# Patient Record
Sex: FEMALE | Race: WHITE | URBAN - METROPOLITAN AREA
[De-identification: names, ages, dates, MRNs, and addresses within clinical notes are randomized per-mention and may not be internally consistent; named-entity substitution may affect disease eponyms.]

---

## 2017-06-05 ENCOUNTER — TRANSFERRED RECORDS (OUTPATIENT)
Dept: HEALTH INFORMATION MANAGEMENT | Facility: CLINIC | Age: 47
End: 2017-06-05

## 2017-06-12 ENCOUNTER — TRANSFERRED RECORDS (OUTPATIENT)
Dept: HEALTH INFORMATION MANAGEMENT | Facility: CLINIC | Age: 47
End: 2017-06-12

## 2017-06-15 ENCOUNTER — TRANSFERRED RECORDS (OUTPATIENT)
Dept: HEALTH INFORMATION MANAGEMENT | Facility: CLINIC | Age: 47
End: 2017-06-15

## 2017-06-23 PROCEDURE — 00000346 ZZHCL STATISTIC REVIEW OUTSIDE SLIDES TC 88321: Performed by: OBSTETRICS & GYNECOLOGY

## 2017-06-28 ENCOUNTER — ONCOLOGY VISIT (OUTPATIENT)
Dept: ONCOLOGY | Facility: CLINIC | Age: 47
End: 2017-06-28
Attending: OBSTETRICS & GYNECOLOGY
Payer: MEDICAID

## 2017-06-28 VITALS
DIASTOLIC BLOOD PRESSURE: 83 MMHG | OXYGEN SATURATION: 94 % | SYSTOLIC BLOOD PRESSURE: 130 MMHG | HEART RATE: 80 BPM | WEIGHT: 285.2 LBS | TEMPERATURE: 98.4 F | HEIGHT: 65 IN | RESPIRATION RATE: 12 BRPM | BODY MASS INDEX: 47.52 KG/M2

## 2017-06-28 DIAGNOSIS — C54.1 ENDOMETRIAL ADENOCARCINOMA (H): Primary | ICD-10-CM

## 2017-06-28 PROCEDURE — 99212 OFFICE O/P EST SF 10 MIN: CPT | Mod: ZF

## 2017-06-28 PROCEDURE — 99205 OFFICE O/P NEW HI 60 MIN: CPT | Mod: ZP | Performed by: OBSTETRICS & GYNECOLOGY

## 2017-06-28 RX ORDER — ALBUTEROL SULFATE 90 UG/1
2 AEROSOL, METERED RESPIRATORY (INHALATION) EVERY 4 HOURS PRN
COMMUNITY
Start: 2014-11-10

## 2017-06-28 RX ORDER — DOXEPIN HYDROCHLORIDE 10 MG/1
20 CAPSULE ORAL AT BEDTIME
COMMUNITY
Start: 2017-05-17 | End: 2018-11-09

## 2017-06-28 RX ORDER — ATORVASTATIN CALCIUM 10 MG/1
TABLET, FILM COATED ORAL
COMMUNITY
Start: 2017-01-23 | End: 2019-07-24

## 2017-06-28 RX ORDER — SYRING-NEEDL,DISP,INSUL,0.3 ML 30 GX5/16"
SYRINGE, EMPTY DISPOSABLE MISCELLANEOUS
COMMUNITY
End: 2018-11-09

## 2017-06-28 RX ORDER — CLOTRIMAZOLE 1 %
CREAM (GRAM) TOPICAL
COMMUNITY
End: 2017-08-21

## 2017-06-28 RX ORDER — ESCITALOPRAM OXALATE 20 MG/1
20 TABLET ORAL AT BEDTIME
COMMUNITY
Start: 2017-06-06 | End: 2018-11-09

## 2017-06-28 RX ORDER — MECOBALAMIN 5000 MCG
2.5-5 TABLET,DISINTEGRATING ORAL
COMMUNITY
Start: 2016-06-21

## 2017-06-28 RX ORDER — DOCUSATE SODIUM 100 MG/1
100 CAPSULE, LIQUID FILLED ORAL 2 TIMES DAILY PRN
COMMUNITY

## 2017-06-28 RX ORDER — LANCETS
1 EACH MISCELLANEOUS
Status: ON HOLD | COMMUNITY
Start: 2016-07-06 | End: 2017-07-21

## 2017-06-28 RX ORDER — CLOBETASOL PROPIONATE 0.5 MG/G
CREAM TOPICAL
COMMUNITY
Start: 2017-05-22 | End: 2018-05-27

## 2017-06-28 RX ORDER — FUROSEMIDE 40 MG
40 TABLET ORAL 2 TIMES DAILY
COMMUNITY
Start: 2016-07-18

## 2017-06-28 RX ORDER — MUPIROCIN 20 MG/G
OINTMENT TOPICAL
COMMUNITY
Start: 2016-08-15 | End: 2018-11-09

## 2017-06-28 RX ORDER — BLOOD-GLUCOSE METER
1 EACH MISCELLANEOUS
COMMUNITY
Start: 2014-03-11 | End: 2018-11-09

## 2017-06-28 ASSESSMENT — PAIN SCALES - GENERAL: PAINLEVEL: NO PAIN (0)

## 2017-06-28 NOTE — PATIENT INSTRUCTIONS
[facility] - DANIELLE    [address] [place] [address]  [place] [address]    12:30 CHECK IN  - -  OPEN SIDED MRI    NOTHING TO EAT/DRINK 2 HOURS BEFORE        [facility]     [address]  [address]  [place] [address]    CHECK IN 1:10    BLOOD SUGARS LESS THAN 200  LOW CARBS    NO EXERCISE FOR 24 HOURS    NOTHING TO EAT OR DRINK, INCLUDING, CAFFEINE, TOBACCO, NO SUGAR    NO CHEWING GUM/ NO SUCKING ON MINTS,    DRINK ALL THE WATER YOU WANT

## 2017-06-28 NOTE — LETTER
2017       RE: Mary Anglin  [address]    [facility] [address]     Dear Colleague,    Thank you for referring your patient, Mary Anglin, to the [facility]. Please see a copy of my visit note below.                            Consult Notes on Referred Patient    Date: 2017       Dr. Chris Roberson  [facility]  [address] [address]       RE: Mary Anglin  : 1970  VALENTE: 2017    Dear Dr. Chris Roberson:    I had the pleasure of seeing your patient Mary Anglin here at the Gynecologic Cancer Clinic at the [facility] on 2017.  As you know she is a very pleasant 47 year old woman with a recent diagnosis of recurrent endometrial cancer.  Given these findings she was subsequently sent to the Gynecologic Cancer Clinic for new patient consultation.   G0 had stage IB grade 1 endometrial cancer in , status post JEMAL, BSO and staging. She had recent vaginal bleeding, exam and biopsy revealed a vaginal lesion, positive for recurrent endometrial cancer.  No B-symptoms or change in urinary or bowel habits.    Past Medical History:    DM    Past Surgical History:    JEMAL, BSO    Health Maintenance:  Health Maintenance Due   Topic Date Due     TETANUS IMMUNIZATION (SYSTEM ASSIGNED)  1987     PAP SCREENING Q3 YR (SYSTEM ASSIGNED)  1990     LIPID SCREEN Q5 YR FEMALE (SYSTEM ASSIGNED)  2014           Current Medications:     has a current medication list which includes the following prescription(s): albuterol, atorvastatin, blood glucose monitoring, blood glucose monitoring, clobetasol, clotrimazole, docusate sodium, doxepin, escitalopram, furosemide, lancet device, blood glucose monitoring, melatonin, metformin, UNABLE TO FIND, and mupirocin.       Allergies:     [unfilled]        Social History:     Social History   Substance Use Topics     Smoking status: Not on file     Smokeless tobacco: Not on file  "    Alcohol use Not on file       History   Drug Use Not on file           Physical Exam:     /83  Pulse 80  Temp 98.4  F (36.9  C) (Oral)  Resp 12  Ht 1.655 m (5' 5.16\")  Wt 129.4 kg (285 lb 3.2 oz)  SpO2 94%  BMI 47.23 kg/m2  Body mass index is 47.23 kg/(m^2).    General Appearance: healthy and alert, no distress     Gastrointestinal:       abdomen soft, non-tender, non-distended, no organomegaly or masses    Genitourinary: External genitalia and urethral meatus appears normal.  Vagina 2 cm friable lesion on the anterior mid vagina. Bimanual exam reveal no masses, nodularity or fullness.  Recto-vaginal exam confirms these findings.      Assessment:    Mary Anglin is a 47 year old woman with a new diagnosis of recurrent endometrial cancer.     A total of 60 minutes was spent with the patient, 40 minutes of which were spent in counseling the patient and/or treatment planning.      1. Recurrent endometrial cancer    We will obtain a PET scan as well as MRI to evaluate for local and distant disease extend and await pathology review. We will see her back after those results for treatment planning.      Thank you for allowing us to participate in the care of your patient.         Sincerely,    Abiodun Gore MD, MS    Department of Obstetrics and Gynecology   Division of Gynecologic Oncology   [facility]  Phone: [phone]      CC  Patient Care Team:  Mikel Wells MD as PCP - General (Family Practice)  Chris Roberson as Referring Physician (OB/Gyn)  "

## 2017-06-28 NOTE — PROGRESS NOTES
"                        Consult Notes on Referred Patient    Date: 2017       Dr. Chris Roberson  [facility]  [address] [place] [address]       RE: Mary Anglin  : 1970  VALENTE: 2017    Dear Dr. Chris Roberson:    I had the pleasure of seeing your patient Mary Anglin here at the Gynecologic Cancer Clinic at the [facility] on 2017.  As you know she is a very pleasant 47 year old woman with a recent diagnosis of recurrent endometrial cancer.  Given these findings she was subsequently sent to the Gynecologic Cancer Clinic for new patient consultation.   G0 had stage IB grade 1 endometrial cancer in , status post JEMAL, BSO and staging. She had recent vaginal bleeding, exam and biopsy revealed a vaginal lesion, positive for recurrent endometrial cancer.  No B-symptoms or change in urinary or bowel habits.    Past Medical History:    DM    Past Surgical History:    JEMAL, BSO    Health Maintenance:  Health Maintenance Due   Topic Date Due     TETANUS IMMUNIZATION (SYSTEM ASSIGNED)  1987     PAP SCREENING Q3 YR (SYSTEM ASSIGNED)  1990     LIPID SCREEN Q5 YR FEMALE (SYSTEM ASSIGNED)  2014           Current Medications:     has a current medication list which includes the following prescription(s): albuterol, atorvastatin, blood glucose monitoring, blood glucose monitoring, clobetasol, clotrimazole, docusate sodium, doxepin, escitalopram, furosemide, lancet device, blood glucose monitoring, melatonin, metformin, UNABLE TO FIND, and mupirocin.       Allergies:     [unfilled]        Social History:     Social History   Substance Use Topics     Smoking status: Not on file     Smokeless tobacco: Not on file     Alcohol use Not on file       History   Drug Use Not on file           Physical Exam:     /83  Pulse 80  Temp 98.4  F (36.9  C) (Oral)  Resp 12  Ht 1.655 m (5' 5.16\")  Wt 129.4 kg (285 lb 3.2 oz)  SpO2 94%  BMI 47.23 " kg/m2  Body mass index is 47.23 kg/(m^2).    General Appearance: healthy and alert, no distress     Gastrointestinal:       abdomen soft, non-tender, non-distended, no organomegaly or masses    Genitourinary: External genitalia and urethral meatus appears normal.  Vagina 2 cm friable lesion on the anterior mid vagina. Bimanual exam reveal no masses, nodularity or fullness.  Recto-vaginal exam confirms these findings.      Assessment:    Mary Anglin is a 47 year old woman with a new diagnosis of recurrent endometrial cancer.     A total of 60 minutes was spent with the patient, 40 minutes of which were spent in counseling the patient and/or treatment planning.      1. Recurrent endometrial cancer    We will obtain a PET scan as well as MRI to evaluate for local and distant disease extend and await pathology review. We will see her back after those results for treatment planning.      Thank you for allowing us to participate in the care of your patient.         Sincerely,    Abiodun Gore MD, MS    Department of Obstetrics and Gynecology   Division of Gynecologic Oncology   [facility]  Phone: [phone]      CC  Patient Care Team:  Mikel Wells MD as PCP - General (Family Practice)  Mindy Roberson as Referring Physician (OB/Gyn)  MINDY ROBERSON

## 2017-06-28 NOTE — MR AVS SNAPSHOT
After Visit Summary   6/28/2017    Mary Anglin    MRN: [id number]           Patient Information     Date Of Birth          1970        Visit Information        Provider Department      6/28/2017 1:00 PM Abiodun Gore MD [facility]        Today's Diagnoses     Endometrial adenocarcinoma (H)    -  1      Care Instructions    [facility] - DANIELLE    [address] [facility] [address]  [place] [address]    12:30 CHECK IN  - -  OPEN SIDED MRI    NOTHING TO EAT/DRINK 2 HOURS BEFORE        [facility] - [facility]     [address]  [address]  [place] [address]    CHECK IN 1:10    BLOOD SUGARS LESS THAN 200  LOW CARBS    NO EXERCISE FOR 24 HOURS    NOTHING TO EAT OR DRINK, INCLUDING, CAFFEINE, TOBACCO, NO SUGAR    NO CHEWING GUM/ NO SUCKING ON MINTS,    DRINK ALL THE WATER YOU WANT           Follow-ups after your visit        Your next 10 appointments already scheduled     Jul 17, 2017  8:00 AM CDT   (Arrive by 7:45 AM)   Return Visit with Abiodun Gore MD   [facility] ([facility])    [address] [place] [address] [phone]              Future tests that were ordered for you today     Open Future Orders        Priority Expected Expires Ordered    MRI Pelvis w & w/o contrast Routine  6/28/2018 6/28/2017    PET Oncology Whole Body Routine  6/28/2018 6/28/2017            Who to contact     If you have questions or need follow up information about today's clinic visit or your schedule please contact [facility] directly at [phone].  Normal or non-critical lab and imaging results will be communicated to you by MyChart, letter or phone within 4 business days after the clinic has received the results. If you do not hear from us within 7 days, please contact the clinic through MyChart or phone. If you have a critical or abnormal lab result, we will notify you by phone as soon as  "possible.  Submit refill requests through [employer] or call your pharmacy and they will forward the refill request to us. Please allow 3 business days for your refill to be completed.          Additional Information About Your Visit        [employer] Information     [employer] lets you send messages to your doctor, view your test results, renew your prescriptions, schedule appointments and more. To sign up, go to www.[facility] [employer] . Click on \"Log in\" on the left side of the screen, which will take you to the Welcome page. Then click on \"Sign up Now\" on the right side of the page.     You will be asked to enter the access code listed below, as well as some personal information. Please follow the directions to create your username and password.     Your access code is: [email or web address]  Expires: 2017 10:37 AM     Your access code will  in 90 days. If you need help or a new code, please call your [place] clinic or [phone].        Care EveryWhere ID     This is your Care EveryWhere ID. This could be used by other organizations to access your [place] medical records  [id number]        Your Vitals Were     Pulse Temperature Respirations Height Pulse Oximetry BMI (Body Mass Index)    80 98.4  F (36.9  C) (Oral) 12 1.655 m (5' 5.16\") 94% 47.23 kg/m2       Blood Pressure from Last 3 Encounters:   17 130/83    Weight from Last 3 Encounters:   17 129.4 kg (285 lb 3.2 oz)               Primary Care Provider Office Phone # Fax #    Mikel Wells [phone]       [address] [address]        Equal Access to Services     [facility] Hospital[facility]ILIA AH: Hadii peggy Coon, waaxda luqadaha, qaybta kaalmada cecy, loraine harris. So [facility] [phone].    ATENCIÓN: Si habla español, tiene a do disposición servicios gratuitos de asistencia lingüística. Llame al [phone].    We comply with applicable federal civil rights " laws and [place] laws. We do not discriminate on the basis of race, color, national origin, age, disability sex, sexual orientation or gender identity.            Thank you!     Thank you for choosing [facility] CANCER CLINIC  for your care. Our goal is always to provide you with excellent care. Hearing back from our patients is one way we can continue to improve our services. Please take a few minutes to complete the written survey that you may receive in the mail after your visit with us. Thank you!             Your Updated Medication List - Protect others around you: Learn how to safely use, store and throw away your medicines at www.disposemymeds.org.          This list is accurate as of: 6/28/17  3:21 PM.  Always use your most recent med list.                   Brand Name Dispense Instructions for use Diagnosis    ACCU-CHEK BRADLEY PLUS test strip   Generic drug:  blood glucose monitoring      1 time per day        albuterol 108 (90 BASE) MCG/ACT Inhaler    PROAIR HFA/PROVENTIL HFA/VENTOLIN HFA     Inhale 2 puffs into the lungs        atorvastatin 10 MG tablet    LIPITOR     Take 0.5 tablets (5 mg total) by mouth every night at bedtime.        blood glucose monitoring lancets      1 each        blood glucose monitoring meter device kit      1 Device        clobetasol 0.05 % cream    TEMOVATE     Apply to affected area 2 times a day        clotrimazole 1 % cream    LOTRIMIN     Apply to affected area 2 times a day PRN        docusate sodium 100 MG capsule    COLACE     Take 100 mg by mouth        doxepin 10 MG capsule    SINEquan     Take 20 mg by mouth        escitalopram 20 MG tablet    LEXAPRO     Take 20 mg by mouth        furosemide 40 MG tablet    LASIX     Take 40 mg by mouth        Lancet Device Misc      As Directed        Melatonin 5 MG Tbdp      Take 2.5-5 mg by mouth        metFORMIN 500 MG tablet    GLUCOPHAGE     Take 500 mg by mouth        mupirocin 2 % ointment    BACTROBAN     Apply to  affected area 2 times a day as needed for other (Specify) (rash on back of neck)        UNABLE TO FIND

## 2017-06-28 NOTE — NURSING NOTE
"Oncology Rooming Note    June 28, 2017 12:55 PM   Mary Anglin is a 47 year old female who presents for:    Chief Complaint   Patient presents with     Oncology Clinic Visit     endometrial adenocarcinoma      Initial Vitals: /83  Pulse 80  Temp 98.4  F (36.9  C) (Oral)  Resp 12  Ht 1.655 m (5' 5.16\")  Wt 129.4 kg (285 lb 3.2 oz)  SpO2 94%  BMI 47.23 kg/m2 Estimated body mass index is 47.23 kg/(m^2) as calculated from the following:    Height as of this encounter: 1.655 m (5' 5.16\").    Weight as of this encounter: 129.4 kg (285 lb 3.2 oz). Body surface area is 2.44 meters squared.  No Pain (0) Comment: Data Unavailable   No LMP recorded.  Allergies reviewed: Yes  Medications reviewed: YES    Medications: Medication refills not needed today.  Pharmacy name entered into EPIC: Data Unavailable    Clinical concerns: no doc was NOT notified.    5 minutes for nursing intake (face to face time)     Prakash Chavez CMA              "

## 2017-06-29 LAB — COPATH REPORT: NORMAL

## 2017-07-07 ENCOUNTER — TRANSFERRED RECORDS (OUTPATIENT)
Dept: HEALTH INFORMATION MANAGEMENT | Facility: CLINIC | Age: 47
End: 2017-07-07

## 2017-07-13 ENCOUNTER — TRANSFERRED RECORDS (OUTPATIENT)
Dept: HEALTH INFORMATION MANAGEMENT | Facility: CLINIC | Age: 47
End: 2017-07-13

## 2017-07-17 ENCOUNTER — ONCOLOGY VISIT (OUTPATIENT)
Dept: ONCOLOGY | Facility: CLINIC | Age: 47
End: 2017-07-17
Attending: OBSTETRICS & GYNECOLOGY
Payer: MEDICAID

## 2017-07-17 VITALS
HEART RATE: 70 BPM | HEIGHT: 65 IN | SYSTOLIC BLOOD PRESSURE: 122 MMHG | BODY MASS INDEX: 48.37 KG/M2 | RESPIRATION RATE: 18 BRPM | DIASTOLIC BLOOD PRESSURE: 70 MMHG | OXYGEN SATURATION: 96 % | TEMPERATURE: 97.4 F | WEIGHT: 290.3 LBS

## 2017-07-17 DIAGNOSIS — C54.1 ENDOMETRIAL CANCER (H): Primary | ICD-10-CM

## 2017-07-17 PROCEDURE — 99212 OFFICE O/P EST SF 10 MIN: CPT | Mod: ZF

## 2017-07-17 PROCEDURE — 99214 OFFICE O/P EST MOD 30 MIN: CPT | Mod: ZP | Performed by: OBSTETRICS & GYNECOLOGY

## 2017-07-17 ASSESSMENT — PAIN SCALES - GENERAL: PAINLEVEL: NO PAIN (0)

## 2017-07-17 NOTE — MR AVS SNAPSHOT
After Visit Summary   7/17/2017    Mary Anglin    MRN: [id number]           Patient Information     Date Of Birth          1970        Visit Information        Provider Department      7/17/2017 8:00 AM Abiodun Gore MD [facility]        Today's Diagnoses     Endometrial cancer (H)    -  1       Follow-ups after your visit        Additional Services     RAD ONCOLOGY REFERRAL       Your provider has referred you to: Rad/Onc    Please be aware that coverage of these services is subject to the terms and limitations of your health insurance plan.  Call member services at your health plan with any benefit or coverage questions.      Please bring the following with you to your appointment:    (1) Any X-Rays, CTs or MRIs which have been performed.  Contact the facility where they were done to arrange for  prior to your scheduled appointment.    (2) List of current medications   (3) This referral request   (4) Any documents/labs given to you for this referral                  Your next 10 appointments already scheduled     Jul 19, 2017 10:30 AM CDT   CONSULT with Hailey Ambriz MD   Radiation Oncology Clinic ([facility])    [facility]  1st Floor  [address] [facility] [address] [facility] [address]   [phone]              Who to contact     If you have questions or need follow up information about today's clinic visit or your schedule please contact [facility] directly at [phone].  Normal or non-critical lab and imaging results will be communicated to you by MyChart, letter or phone within 4 business days after the clinic has received the results. If you do not hear from us within 7 days, please contact the clinic through MyChart or phone. If you have a critical or abnormal lab result, we will notify you by phone as soon as possible.  Submit refill requests through [employer] or call your pharmacy and they  "will forward the refill request to us. Please allow 3 business days for your refill to be completed.          Additional Information About Your Visit        [employer] Information     [employer] lets you send messages to your doctor, view your test results, renew your prescriptions, schedule appointments and more. To sign up, go to [email or web address] . Click on \"Log in\" on the left side of the screen, which will take you to the Welcome page. Then click on \"Sign up Now\" on the right side of the page.     You will be asked to enter the access code listed below, as well as some personal information. Please follow the directions to create your username and password.     Your access code is: [email or web address]  Expires: 2017 10:37 AM     Your access code will  in 90 days. If you need help or a new code, please call your [place] clinic or [phone].        Care EveryWhere ID     This is your [facility] EveryWhere ID. This could be used by other organizations to access your [facility] medical records  [id number]        Your Vitals Were     Pulse Temperature Respirations Height Pulse Oximetry BMI (Body Mass Index)    70 97.4  F (36.3  C) (Oral) 18 1.655 m (5' 5.16\") 96% 48.08 kg/m2       Blood Pressure from Last 3 Encounters:   17 122/70   17 130/83    Weight from Last 3 Encounters:   17 131.7 kg (290 lb 4.8 oz)   17 129.4 kg (285 lb 3.2 oz)              We Performed the Following     RAD ONCOLOGY REFERRAL        Primary Care Provider Office Phone # Fax #    Mikel Wells [phone]       [place] [id number]        Equal Access to Services     ALEX ROSAS : Marcia Coon, wajosé miguel gibbs, qaybta kaalmamatt sam, loraine harris. So [facility] [phone].    ATENCIÓN: Si habla español, tiene a do disposición servicios gratuitos de asistencia lingüística. Gregor smith [phone].    We comply with " applicable federal civil rights laws and [place] laws. We do not discriminate on the basis of race, color, national origin, age, disability sex, sexual orientation or gender identity.            Thank you!     Thank you for choosing [facility]INIC  for your care. Our goal is always to provide you with excellent care. Hearing back from our patients is one way we can continue to improve our services. Please take a few minutes to complete the written survey that you may receive in the mail after your visit with us. Thank you!             Your Updated Medication List - Protect others around you: Learn how to safely use, store and throw away your medicines at www.disposemymeds.org.          This list is accurate as of: 7/17/17  9:04 AM.  Always use your most recent med list.                   Brand Name Dispense Instructions for use Diagnosis    ACCU-CHEK BRADLEY PLUS test strip   Generic drug:  blood glucose monitoring      1 time per day        albuterol 108 (90 BASE) MCG/ACT Inhaler    PROAIR HFA/PROVENTIL HFA/VENTOLIN HFA     Inhale 2 puffs into the lungs        atorvastatin 10 MG tablet    LIPITOR     Take 0.5 tablets (5 mg total) by mouth every night at bedtime.        blood glucose monitoring lancets      1 each        blood glucose monitoring meter device kit      1 Device        clobetasol 0.05 % cream    TEMOVATE     Apply to affected area 2 times a day        clotrimazole 1 % cream    LOTRIMIN     Apply to affected area 2 times a day PRN        docusate sodium 100 MG capsule    COLACE     Take 100 mg by mouth        doxepin 10 MG capsule    SINEquan     Take 20 mg by mouth        escitalopram 20 MG tablet    LEXAPRO     Take 20 mg by mouth        furosemide 40 MG tablet    LASIX     Take 40 mg by mouth        Lancet Device Misc      As Directed        Melatonin 5 MG Tbdp      Take 2.5-5 mg by mouth        metFORMIN 500 MG tablet    GLUCOPHAGE     Take 500 mg by mouth        mupirocin 2 %  ointment    BACTROBAN     Apply to affected area 2 times a day as needed for other (Specify) (rash on back of neck)        UNABLE TO FIND

## 2017-07-17 NOTE — NURSING NOTE
"Oncology Rooming Note    July 17, 2017 8:12 AM   Mary Anglin is a 47 year old female who presents for:    Chief Complaint   Patient presents with     Oncology Clinic Visit     Return-Endometrial Adenocarcinoma     Initial Vitals: /70 (BP Location: Right arm, Patient Position: Chair, Cuff Size: Adult Large)  Pulse 70  Temp 97.4  F (36.3  C) (Oral)  Resp 18  Ht 1.655 m (5' 5.16\")  Wt 131.7 kg (290 lb 4.8 oz)  SpO2 96%  BMI 48.08 kg/m2 Estimated body mass index is 48.08 kg/(m^2) as calculated from the following:    Height as of this encounter: 1.655 m (5' 5.16\").    Weight as of this encounter: 131.7 kg (290 lb 4.8 oz). Body surface area is 2.46 meters squared.  No Pain (0) Comment: Data Unavailable   No LMP recorded.  Allergies reviewed: Yes  Medications reviewed: Yes    Medications: Medication refills not needed today.  Pharmacy name entered into EPIC: Data Unavailable    Clinical concerns:     6 minutes for nursing intake (face to face time)     Margy Chavira LPN            "

## 2017-07-17 NOTE — LETTER
2017       RE: Mary Anglin  [address]    formerly [facility] [address]     Dear Colleague,    Thank you for referring your patient, Mary Anglin, to the [facility]. Please see a copy of my visit note below.                Follow Up Notes on Referred Patient    Date: 2017       Dr. Mikel Wells MD  [facility]  [address] [place] [address]       RE: Mary Anglin  : 1970  VALENTE: 2017    Dear Dr. Mikel Wells:    Mary Anglin is a 47 year old woman with a diagnosis of recurrent endometrial cancer.     Patient presents today for follow-up after her PET scan no new symptoms since last time I've seen her.        Health Maintenance Due   Topic Date Due     TETANUS IMMUNIZATION (SYSTEM ASSIGNED)  1987     PAP SCREENING Q3 YR (SYSTEM ASSIGNED)  1990     LIPID SCREEN Q5 YR FEMALE (SYSTEM ASSIGNED)  2014       Current Medications:     Current Outpatient Prescriptions   Medication Sig Dispense Refill     albuterol (PROAIR HFA/PROVENTIL HFA/VENTOLIN HFA) 108 (90 BASE) MCG/ACT Inhaler Inhale 2 puffs into the lungs       atorvastatin (LIPITOR) 10 MG tablet Take 0.5 tablets (5 mg total) by mouth every night at bedtime.       blood glucose monitoring (ACCU-CHEK BRADLEY PLUS) meter device kit 1 Device       blood glucose monitoring (ACCU-CHEK BRADLEY PLUS) test strip 1 time per day       clobetasol (TEMOVATE) 0.05 % cream Apply to affected area 2 times a day       clotrimazole (LOTRIMIN) 1 % cream Apply to affected area 2 times a day PRN        docusate sodium (COLACE) 100 MG capsule Take 100 mg by mouth       doxepin (SINEQUAN) 10 MG capsule Take 20 mg by mouth       escitalopram (LEXAPRO) 20 MG tablet Take 20 mg by mouth       furosemide (LASIX) 40 MG tablet Take 40 mg by mouth       Lancet Device MISC As Directed       blood glucose monitoring (SOFTCLIX) lancets 1 each       Melatonin 5 MG TBDP Take 2.5-5 mg by mouth        "metFORMIN (GLUCOPHAGE) 500 MG tablet Take 500 mg by mouth       UNABLE TO FIND        mupirocin (BACTROBAN) 2 % ointment Apply to affected area 2 times a day as needed for other (Specify) (rash on back of neck)           Allergies:        Allergies   Allergen Reactions     Penicillin G Other (See Comments)     Azithromycin Rash     Benzonatate Unknown     Cephalexin Unknown     Sulfamethoxazole W/Trimethoprim Rash     Bupropion Other (See Comments)     No rash     Clindamycin Other (See Comments)     Aspirin Other (See Comments)     Pt refuses to take ASA        Social History:     Social History   Substance Use Topics     Smoking status: Never Smoker     Smokeless tobacco: Not on file     Alcohol use Not on file       History   Drug Use Not on file             Physical Exam:     /70 (BP Location: Right arm, Patient Position: Chair, Cuff Size: Adult Large)  Pulse 70  Temp 97.4  F (36.3  C) (Oral)  Resp 18  Ht 1.655 m (5' 5.16\")  Wt 131.7 kg (290 lb 4.8 oz)  SpO2 96%  BMI 48.08 kg/m2  Body mass index is 48.08 kg/(m^2).    General Appearance: healthy and alert, no distress           Assessment:    Mary Anglin is a 47 year old woman with a diagnosis of recurrent endometrial cancer.     A total of 40 minutes was spent with the patient, 30 minutes of which were spent in counseling the patient and/or treatment planning.    #1 recurrent recurrent endometrial cancer    #2 local vaginal recurrence     #3 Class 3 obesity    I discussed with the patient as well as with her family that the PET scan as well as the MRI does not show any evidence of distant disease. We did discuss again treatment option given her recurrence appears to be localized radiation therapy would her be her best initial treatment option. I will have her see my colleagues in  radiation oncology we did discuss briefly radiation treatment with external beam radiation and possible brachytherapy. The patient as well as her family agree with " this plan they are very  appreciate of her care all questions were answered.    Abiodun Gore MD, MS    Department of Obstetrics and Gynecology   Division of Gynecologic Oncology   [facility]  Phone: [phone]          CC  Patient Care Team:  Mikel Wells MD as PCP - General (Family Practice)  Chris Roberson as Referring Physician (OB/Gyn)

## 2017-07-17 NOTE — PROGRESS NOTES
Follow Up Notes on Referred Patient    Date: 2017       Dr. Mikel Wells MD  [facility]  [address] [place] [address]       RE: Mary Anglin  : 1970  VALENTE: 2017    Dear Dr. Mikel Wells:    Mary Anglin is a 47 year old woman with a diagnosis of recurrent endometrial cancer.     Patient presents today for follow-up after her PET scan no new symptoms since last time I've seen her.        Health Maintenance Due   Topic Date Due     TETANUS IMMUNIZATION (SYSTEM ASSIGNED)  1987     PAP SCREENING Q3 YR (SYSTEM ASSIGNED)  1990     LIPID SCREEN Q5 YR FEMALE (SYSTEM ASSIGNED)  2014       Current Medications:     Current Outpatient Prescriptions   Medication Sig Dispense Refill     albuterol (PROAIR HFA/PROVENTIL HFA/VENTOLIN HFA) 108 (90 BASE) MCG/ACT Inhaler Inhale 2 puffs into the lungs       atorvastatin (LIPITOR) 10 MG tablet Take 0.5 tablets (5 mg total) by mouth every night at bedtime.       blood glucose monitoring (ACCU-CHEK BRADLEY PLUS) meter device kit 1 Device       blood glucose monitoring (ACCU-CHEK BRADLEY PLUS) test strip 1 time per day       clobetasol (TEMOVATE) 0.05 % cream Apply to affected area 2 times a day       clotrimazole (LOTRIMIN) 1 % cream Apply to affected area 2 times a day PRN        docusate sodium (COLACE) 100 MG capsule Take 100 mg by mouth       doxepin (SINEQUAN) 10 MG capsule Take 20 mg by mouth       escitalopram (LEXAPRO) 20 MG tablet Take 20 mg by mouth       furosemide (LASIX) 40 MG tablet Take 40 mg by mouth       Lancet Device MISC As Directed       blood glucose monitoring (SOFTCLIX) lancets 1 each       Melatonin 5 MG TBDP Take 2.5-5 mg by mouth       metFORMIN (GLUCOPHAGE) 500 MG tablet Take 500 mg by mouth       UNABLE TO FIND        mupirocin (BACTROBAN) 2 % ointment Apply to affected area 2 times a day as needed for other (Specify) (rash on back of neck)           Allergies:       "  Allergies   Allergen Reactions     Penicillin G Other (See Comments)     Azithromycin Rash     Benzonatate Unknown     Cephalexin Unknown     Sulfamethoxazole W/Trimethoprim Rash     Bupropion Other (See Comments)     No rash     Clindamycin Other (See Comments)     Aspirin Other (See Comments)     Pt refuses to take ASA        Social History:     Social History   Substance Use Topics     Smoking status: Never Smoker     Smokeless tobacco: Not on file     Alcohol use Not on file       History   Drug Use Not on file             Physical Exam:     /70 (BP Location: Right arm, Patient Position: Chair, Cuff Size: Adult Large)  Pulse 70  Temp 97.4  F (36.3  C) (Oral)  Resp 18  Ht 1.655 m (5' 5.16\")  Wt 131.7 kg (290 lb 4.8 oz)  SpO2 96%  BMI 48.08 kg/m2  Body mass index is 48.08 kg/(m^2).    General Appearance: healthy and alert, no distress           Assessment:    Mary Anglin is a 47 year old woman with a diagnosis of recurrent endometrial cancer.     A total of 40 minutes was spent with the patient, 30 minutes of which were spent in counseling the patient and/or treatment planning.    #1 recurrent recurrent endometrial cancer    #2 local vaginal recurrence     #3 Class 3 obesity    I discussed with the patient as well as with her family that the PET scan as well as the MRI does not show any evidence of distant disease. We did discuss again treatment option given her recurrence appears to be localized radiation therapy would her be her best initial treatment option. I will have her see my colleagues in  radiation oncology we did discuss briefly radiation treatment with external beam radiation and possible brachytherapy. The patient as well as her family agree with this plan they are very  appreciate of her care all questions were answered.    Abiodun Gore MD, MS    Department of Obstetrics and Gynecology   Division of Gynecologic Oncology   [facility]" [place]  Phone: [phone]          CC  Patient Care Team:  Maryse Marti MD as PCP - General (Family Practice)  Chris Roberson as Referring Physician (OB/Gyn)  MARYSE MARTI

## 2017-07-18 DIAGNOSIS — C54.1 ENDOMETRIAL CANCER (H): Primary | ICD-10-CM

## 2017-07-19 ENCOUNTER — OFFICE VISIT (OUTPATIENT)
Dept: RADIATION ONCOLOGY | Facility: CLINIC | Age: 47
End: 2017-07-19
Attending: RADIOLOGY
Payer: MEDICAID

## 2017-07-19 ENCOUNTER — ANESTHESIA EVENT (OUTPATIENT)
Dept: SURGERY | Facility: CLINIC | Age: 47
End: 2017-07-19
Payer: MEDICAID

## 2017-07-19 VITALS — WEIGHT: 288 LBS | BODY MASS INDEX: 47.7 KG/M2

## 2017-07-19 DIAGNOSIS — C54.1 ENDOMETRIAL CANCER (H): Primary | ICD-10-CM

## 2017-07-19 PROCEDURE — 99214 OFFICE O/P EST MOD 30 MIN: CPT | Mod: 25 | Performed by: RADIOLOGY

## 2017-07-19 ASSESSMENT — ENCOUNTER SYMPTOMS
FEVER: 0
CHILLS: 0
HEARTBURN: 0
CONSTIPATION: 1
HEADACHES: 0
BLURRED VISION: 0
COUGH: 0
DEPRESSION: 1
DYSURIA: 0
SHORTNESS OF BREATH: 0

## 2017-07-19 NOTE — PROGRESS NOTES
HPI  INITIAL PATIENT ASSESSMENT    Diagnosis: recurrent endometrial cancer    Prior radiation therapy: None    Prior chemotherapy: None    Prior hormonal therapy:No    Pain Eval:  Denies    Psychosocial  Living arrangements: by self, sister Isela lives in [facility], here today  Fall Risk: independent   referral needs: Not needed    Advanced Directive: No  Implantable Cardiac Device? No    Onset of menarche: about age 12  LMP: No LMP recorded.  Onset of menopause: off and on periods in 2004, then had hysterectomy  Abnormal vaginal bleeding/discharge: No  Are you pregnant? No  Reproductive note:       Review of Systems   Constitutional: Negative for chills and fever.   HENT: Negative for hearing loss.    Eyes: Negative for blurred vision.   Respiratory: Negative for cough and shortness of breath.    Cardiovascular: Negative for chest pain.   Gastrointestinal: Positive for constipation (stool softener). Negative for heartburn.   Genitourinary: Negative for dysuria.   Skin: Positive for rash (rash- several scattered rashes- creams that she uses off and on).   Neurological: Negative for headaches.   Psychiatric/Behavioral: Positive for depression.

## 2017-07-19 NOTE — PROGRESS NOTES
RADIATION ONCOLOGY CONSULT   Date: Jul 19, 2017    DIAGNOSIS: Recurrent endometrial cancer confined to vagina    HISTORY OF PRESENT ILLNESS:   Ms. Anglin is a 47 year old female with recurrent endometrial cancer. She was originally diagnosed with an endometrial cancer in 2004. She underwent total abdominal hysterectomy with bilateral salpingo-oophorectomy and lymph node dissection. We do not have the pathology reports from this surgery. However, per documentation review, she was found to have grade 1 stage IB endometrial adenocarcinoma. Given that she was felt to be at low risk for recurrence, she did not receive any adjuvant therapy. She proceeded to do well for several years and was followed regularly with no evidence of recurrence.     In June 2017, 13 years after her initial diagnosis,  she was seen by her gynecologist for a two-month history of vaginal bleeding. Physical exam at that time the identified a 1.5 cm friable mass along the anterior vaginal wall, in close proximity to the introitus. A biopsy of the mass was completed that time. This was positive for adenocarcinoma consistent with endometrial recurrence, grade unspecified. This was confirmed by pathology at our institution. She was subsequently referred to Dr. Gore of with gynecologic oncology at the [facility], who she met with on 6/28/2017. Physical exam at that time  Revealed a 2 cm friable lesion on the anterior mid vagina.         He recommended further workup with a PET CT and MRI of the pelvis, which were completed at outside institution. The recurrent mass was not able to be appreciated on MRI and there is no hypermetabolic adenopathy noted on PET/CT. Unfortunately, the MRI was not completed with vaginal gel. She was seen for follow-up by Dr. Gore. At that time, it was recommended that she undergo treatment with radiation therapy. She was also referred to our clinic for further discussion regarding treatment.    On  exam today Ms. Anglin reports feeling quite well. She continues to have some intermittent vaginal spotting. She otherwise feels in her normal state of health and denies any abdominal or pelvic pain. She otherwise has no additional concerns or complaints or remainder of ROS is negative.    PAST MEDICAL HISTORY:   Past Medical History:   Diagnosis Date     Depression      Diabetes (H)      Endometrial cancer (H)        Past Surgical History:   Procedure Laterality Date     HYSTERECTOMY  2004       CHEMOTHERAPY HISTORY: None    RADIATION THERAPY HISTORY: None    PREGNANCY STATUS: No. Patient is status post hysterectomy    IMPLANTED CARDIAC DEVICE: No      MEDICATIONS:  Current Outpatient Prescriptions   Medication Sig Dispense Refill     albuterol (PROAIR HFA/PROVENTIL HFA/VENTOLIN HFA) 108 (90 BASE) MCG/ACT Inhaler Inhale 2 puffs into the lungs       atorvastatin (LIPITOR) 10 MG tablet Take 0.5 tablets (5 mg total) by mouth every night at bedtime.       clobetasol (TEMOVATE) 0.05 % cream Apply to affected area 2 times a day       clotrimazole (LOTRIMIN) 1 % cream Apply to affected area 2 times a day PRN        docusate sodium (COLACE) 100 MG capsule Take 100 mg by mouth       doxepin (SINEQUAN) 10 MG capsule Take 20 mg by mouth       escitalopram (LEXAPRO) 20 MG tablet Take 20 mg by mouth       furosemide (LASIX) 40 MG tablet Take 40 mg by mouth       Lancet Device MISC As Directed       Melatonin 5 MG TBDP Take 2.5-5 mg by mouth       metFORMIN (GLUCOPHAGE) 500 MG tablet Take 500 mg by mouth       blood glucose monitoring (ACCU-CHEK BRADLEY PLUS) meter device kit 1 Device       blood glucose monitoring (ACCU-CHEK BRADLEY PLUS) test strip 1 time per day       blood glucose monitoring (SOFTCLIX) lancets 1 each       UNABLE TO FIND        mupirocin (BACTROBAN) 2 % ointment Apply to affected area 2 times a day as needed for other (Specify) (rash on back of neck)         ALLERGIES:   Allergies   Allergen Reactions      Penicillin G Other (See Comments)     Azithromycin Rash     Benzonatate Unknown     Cephalexin Unknown     Sulfamethoxazole W/Trimethoprim Rash     Bupropion Other (See Comments)     No rash     Clindamycin Other (See Comments)     Aspirin Other (See Comments)     Pt refuses to take ASA       SOCIAL HISTORY:  Social History     Social History     Marital status: Single     Spouse name: N/A     Number of children: N/A     Years of education: N/A     Occupational History     Not on file.     Social History Main Topics     Smoking status: Never Smoker     Smokeless tobacco: Not on file     Alcohol use No     Drug use: Not on file     Sexual activity: Not on file     Other Topics Concern     Not on file     Social History Narrative           FAMILY HISTORY:   Family History   Problem Relation Age of Onset     Skin Cancer Father      Breast Cancer Maternal Grandfather         REVIEW OF SYMPTOMS:  A 10-point review of systems was performed. Pertinent findings are noted in the HPI.      PHYSICAL EXAMINATION:    Wt 130.6 kg (288 lb)  BMI 47.7 kg/m2    Gen: Alert, in NAD  Pulm: No wheezing, stridor or respiratory distress  CV: Well-perfused, no cyanosis. 1+ pitting edema in bilateral lower extremities   /GYN: Deferred  As we have an examination under anesthesia scheduled  Musculoskeletal: Normal muscle bulk and tone  Skin: Normal color and turgor     ASSESSMENT  Recurrent endometrial adenocarcinoma confined to the mid vagina anterior wall   2 cm size.     RECOMMENDATION:  We would recommend radiotherapy in an effort to provide permanent local control and hopefully cure for this small, presumably localized recurrence in the mid vagina.    We are recommending an examination under anesthesia and repeat MRI with US gel to get the best delineation of the extent of tumor.      We explained to her that the treatment consists of 5 weeks of external beam radiation therapy to the pelvis followed by vaginal brachytherapy. At this  time, we're unsure whether brachytherapy will will be done with a vaginal cylinder treatment or with interstitial needles.    The repeat MRI and EUA may help clarify that.      . The patient and her sister both asked appropriate questions, which were answered to their satisfaction, and verbalized understanding. Short and long term risks were discussed in detail including possible bowel, bladder bone or vaginal damage.        Informed consent was signed. We'll plan for her to return for CT simulation after placement of the gold fiducial markers, which will most likely take place this Friday.      Ms. Anglin was seen and discussed with staff, Dr. Ambriz.    Thank you for allowing us to participate in this patient's care. Please feel free to call with any questions or concerns.    Lazaro Nichols MD  Resident   Department of Radiation Oncology   [facility]    I was personally present with the resident during the history and exam.  I   discussed the case with the resident and agree with the findings and plan of care as documented in the resident's note. The risks and benefits of radiotherapy were discussed with the patient.  All questions were answered.  Please do not hesitate to call me if you have questions or concerns    Hailey Ambriz MD  Pager  [phone]  Clinic   [phone]  [facility]        CC  Patient Care Team:  Mikel Wells MD as PCP - General (Family Practice)  Chris Roberson as Referring Physician (OB/Gyn)  VARSHA LEE

## 2017-07-19 NOTE — MR AVS SNAPSHOT
After Visit Summary   7/19/2017    Mary Anglin    MRN: [id number]           Patient Information     Date Of Birth          1970        Visit Information        Provider Department      7/19/2017 10:30 AM Hailey Ambriz MD Radiation Oncology Clinic        Today's Diagnoses     Endometrial cancer (H)    -  1       Follow-ups after your visit        Your next 10 appointments already scheduled     Jul 20, 2017  3:30 PM CDT   (Arrive by 3:15 PM)   PAC EVALUATION with  Pac Angella 2   [facility] Preoperative Assessment [place] ([facility])    [address] [place] [address]  4th [facility] [address]   [phone]            Jul 20, 2017  4:30 PM CDT   (Arrive by 4:15 PM)   PAC RN ASSESSMENT with  Pac Rn   [facility] Assessment [place] ([facility])    [address] [place] [address]   [phone]            Jul 20, 2017  4:40 PM CDT   (Arrive by 4:25 PM)   PAC Anesthesia Consult with  Pac Anesthesiologist   [facility] Preoperative Assessment [place] ([facility])    [address] [place] [address]   [phone]            Jul 21, 2017 10:00 AM CDT   TCT/SIM Suite Visit with Jarrod Kessler MD   Radiation Oncology Clinic ([facility])    [facility]  1st Floor  [address] [facility] [address]   [phone]            Jul 21, 2017 12:55 PM CDT   TCT/SIM Suite Visit with Jarrod Kessler MD   Radiation Oncology Clinic ([facility])    [facility]  1st Floor  [address] [facility] [address]   [phone]            Jul 21, 2017   Procedure with Jarrod Kessler MD   [facility] [place], Same Day Surgery (--)    [address] [facility] [address]   [phone]            Jul 21, 2017  3:00 PM CDT   TCT/SIM Suite Visit with Jarrod Kessler MD   Radiation Oncology Clinic ([facility]  Clinics)    [facility]  1st Floor  [address] [facility] [address]   [phone]              Who to contact     Please call your clinic at [phone] to:    Ask questions about your health    Make or cancel appointments    Discuss your medicines    Learn about your test results    Speak to your doctor   If you have compliments or concerns about an experience at your clinic, or if you wish to file a complaint, please contact [facility] Patient Relations at [phone] or email us at [email or web address]         Additional Information About Your Visit        [employer] Information     [employer] is an electronic gateway that provides easy, online access to your medical records. With [employer], you can request a clinic appointment, read your test results, renew a prescription or communicate with your care team.     To sign up for [employer] visit the website at [email or web address]   You will be asked to enter the access code listed below, as well as some personal information. Please follow the directions to create your username and password.     Your access code is: [email or web address]  Expires: 2017 10:37 AM     Your access code will  in 90 days. If you need help or a new code, please contact your [facility] Clinic or call [phone] for assistance.        Care EveryWhere ID     This is your Care EveryWhere ID. This could be used by other organizations to access your [place] medical records  [id number]        Your Vitals Were     BMI (Body Mass Index)                   47.7 kg/m2            Blood Pressure from Last 3 Encounters:   17 122/70   17 130/83    Weight from Last 3 Encounters:   17 130.6 kg (288 lb)   17 131.7 kg (290 lb 4.8 oz)   17 129.4 kg (285 lb 3.2 oz)              We Performed the Following     Margarita-Operative Worksheet (Rad Onc)        Primary Care Provider  Office Phone # Fax #    Mikel Wells [phone]       [facility] [address] [facility] [address]        Equal Access to Services     ALEX ROSAS : Hadii peggy soliz tayloro Socollinsali, waaxda luqadaha, qaybta kaalmada ademackenzie, loraine mobley alexdorian cardcarlos harris. So [facility] [phone].    ATENCIÓN: Si habla español, tiene a do disposición servicios gratuitos de asistencia lingüística. Llame al [phone].    We comply with applicable federal civil rights laws and [place] laws. We do not discriminate on the basis of race, color, national origin, age, disability sex, sexual orientation or gender identity.            Thank you!     Thank you for choosing RADIATION ONCOLOGY CLINIC  for your care. Our goal is always to provide you with excellent care. Hearing back from our patients is one way we can continue to improve our services. Please take a few minutes to complete the written survey that you may receive in the mail after your visit with us. Thank you!             Your Updated Medication List - Protect others around you: Learn how to safely use, store and throw away your medicines at www.disposemymeds.org.          This list is accurate as of: 7/19/17 11:59 PM.  Always use your most recent med list.                   Brand Name Dispense Instructions for use Diagnosis    ACCU-CHEK BRADLEY PLUS test strip   Generic drug:  blood glucose monitoring      1 time per day        albuterol 108 (90 BASE) MCG/ACT Inhaler    PROAIR HFA/PROVENTIL HFA/VENTOLIN HFA     Inhale 2 puffs into the lungs        atorvastatin 10 MG tablet    LIPITOR     Take 0.5 tablets (5 mg total) by mouth every night at bedtime.        blood glucose monitoring lancets      1 each        blood glucose monitoring meter device kit      1 Device        clobetasol 0.05 % cream    TEMOVATE     Apply to affected area 2 times a day        clotrimazole 1 % cream    LOTRIMIN     Apply to affected area 2 times a day  PRN        docusate sodium 100 MG capsule    COLACE     Take 100 mg by mouth        doxepin 10 MG capsule    SINEquan     Take 20 mg by mouth        escitalopram 20 MG tablet    LEXAPRO     Take 20 mg by mouth        furosemide 40 MG tablet    LASIX     Take 40 mg by mouth        Lancet Device Misc      As Directed        Melatonin 5 MG Tbdp      Take 2.5-5 mg by mouth        metFORMIN 500 MG tablet    GLUCOPHAGE     Take 500 mg by mouth        mupirocin 2 % ointment    BACTROBAN     Apply to affected area 2 times a day as needed for other (Specify) (rash on back of neck)        UNABLE TO FIND

## 2017-07-19 NOTE — PATIENT INSTRUCTIONS
Lucia  Please try to get this today....  I know she had one recnetly but they FORGOT the ultrasound gel

## 2017-07-19 NOTE — LETTER
7/19/2017       RE: Mary Anglin  [address]    [facility] [address]     Dear Colleague,    Thank you for referring your patient, Mary Anglin, to the RAD[facility]ATION ONCOL[facility]Y CLINIC. Please see a copy of my visit note below.      HPI  INITIAL PATIENT ASSESSMENT    Diagnosis: recurrent endometrial cancer    Prior radiation therapy: None    Prior chemotherapy: None    Prior hormonal therapy:No    Pain Eval:  Denies    Psychosocial  Living arrangements: by self, sister Isela lives in [facility], here today  Fall Risk: independent   referral needs: Not needed    Advanced Directive: No  Implantable Cardiac Device? No    Onset of menarche: about age 12  LMP: No LMP recorded.  Onset of menopause: off and on periods in 2004, then had hysterectomy  Abnormal vaginal bleeding/discharge: No  Are you pregnant? No  Reproductive note:       Review of Systems   Constitutional: Negative for chills and fever.   HENT: Negative for hearing loss.    Eyes: Negative for blurred vision.   Respiratory: Negative for cough and shortness of breath.    Cardiovascular: Negative for chest pain.   Gastrointestinal: Positive for constipation (stool softener). Negative for heartburn.   Genitourinary: Negative for dysuria.   Skin: Positive for rash (rash- several scattered rashes- creams that she uses off and on).   Neurological: Negative for headaches.   Psychiatric/Behavioral: Positive for depression.                 RADIATION ONCOLOGY CONSULT   Date: Jul 19, 2017    DIAGNOSIS: Recurrent endometrial cancer confined to vagina    HISTORY OF PRESENT ILLNESS:   Ms. Anglin is a 47 year old female with recurrent endometrial cancer. She was originally diagnosed with an endometrial cancer in 2004. She underwent total abdominal hysterectomy with bilateral salpingo-oophorectomy and lymph node dissection. We do not have the pathology reports from this surgery. However, per documentation review, she was found to have grade  1 stage IB endometrial adenocarcinoma. Given that she was felt to be at low risk for recurrence, she did not receive any adjuvant therapy. She proceeded to do well for several years and was followed regularly with no evidence of recurrence.     In June 2017, 13 years after her initial diagnosis,  she was seen by her gynecologist for a two-month history of vaginal bleeding. Physical exam at that time the identified a 1.5 cm friable mass along the anterior vaginal wall, in close proximity to the introitus. A biopsy of the mass was completed that time. This was positive for adenocarcinoma consistent with endometrial recurrence, grade unspecified. This was confirmed by pathology at our institution. She was subsequently referred to Dr. Gore of with gynecologic oncology at the [facility], who she met with on 6/28/2017. Physical exam at that time  Revealed a 2 cm friable lesion on the anterior mid vagina.         He recommended further workup with a PET CT and MRI of the pelvis, which were completed at outside institution. The recurrent mass was not able to be appreciated on MRI and there is no hypermetabolic adenopathy noted on PET/CT. Unfortunately, the MRI was not completed with vaginal gel. She was seen for follow-up by Dr. Gore. At that time, it was recommended that she undergo treatment with radiation therapy. She was also referred to our clinic for further discussion regarding treatment.    On exam today Ms. Anglin reports feeling quite well. She continues to have some intermittent vaginal spotting. She otherwise feels in her normal state of health and denies any abdominal or pelvic pain. She otherwise has no additional concerns or complaints or remainder of ROS is negative.    PAST MEDICAL HISTORY:   Past Medical History:   Diagnosis Date     Depression      Diabetes (H)      Endometrial cancer (H)        Past Surgical History:   Procedure Laterality Date     HYSTERECTOMY  2004        CHEMOTHERAPY HISTORY: None    RADIATION THERAPY HISTORY: None    PREGNANCY STATUS: No. Patient is status post hysterectomy    IMPLANTED CARDIAC DEVICE: No      MEDICATIONS:  Current Outpatient Prescriptions   Medication Sig Dispense Refill     albuterol (PROAIR HFA/PROVENTIL HFA/VENTOLIN HFA) 108 (90 BASE) MCG/ACT Inhaler Inhale 2 puffs into the lungs       atorvastatin (LIPITOR) 10 MG tablet Take 0.5 tablets (5 mg total) by mouth every night at bedtime.       clobetasol (TEMOVATE) 0.05 % cream Apply to affected area 2 times a day       clotrimazole (LOTRIMIN) 1 % cream Apply to affected area 2 times a day PRN        docusate sodium (COLACE) 100 MG capsule Take 100 mg by mouth       doxepin (SINEQUAN) 10 MG capsule Take 20 mg by mouth       escitalopram (LEXAPRO) 20 MG tablet Take 20 mg by mouth       furosemide (LASIX) 40 MG tablet Take 40 mg by mouth       Lancet Device MISC As Directed       Melatonin 5 MG TBDP Take 2.5-5 mg by mouth       metFORMIN (GLUCOPHAGE) 500 MG tablet Take 500 mg by mouth       blood glucose monitoring (ACCU-CHEK BRADLEY PLUS) meter device kit 1 Device       blood glucose monitoring (ACCU-CHEK BRADLEY PLUS) test strip 1 time per day       blood glucose monitoring (SOFTCLIX) lancets 1 each       UNABLE TO FIND        mupirocin (BACTROBAN) 2 % ointment Apply to affected area 2 times a day as needed for other (Specify) (rash on back of neck)         ALLERGIES:   Allergies   Allergen Reactions     Penicillin G Other (See Comments)     Azithromycin Rash     Benzonatate Unknown     Cephalexin Unknown     Sulfamethoxazole W/Trimethoprim Rash     Bupropion Other (See Comments)     No rash     Clindamycin Other (See Comments)     Aspirin Other (See Comments)     Pt refuses to take ASA       SOCIAL HISTORY:  Social History     Social History     Marital status: Single     Spouse name: N/A     Number of children: N/A     Years of education: N/A     Occupational History     Not on file.     Social  History Main Topics     Smoking status: Never Smoker     Smokeless tobacco: Not on file     Alcohol use No     Drug use: Not on file     Sexual activity: Not on file     Other Topics Concern     Not on file     Social History Narrative           FAMILY HISTORY:   Family History   Problem Relation Age of Onset     Skin Cancer Father      Breast Cancer Maternal Grandfather         REVIEW OF SYMPTOMS:  A 10-point review of systems was performed. Pertinent findings are noted in the HPI.      PHYSICAL EXAMINATION:    Wt 130.6 kg (288 lb)  BMI 47.7 kg/m2    Gen: Alert, in NAD  Pulm: No wheezing, stridor or respiratory distress  CV: Well-perfused, no cyanosis. 1+ pitting edema in bilateral lower extremities   /GYN: Deferred  As we have an examination under anesthesia scheduled  Musculoskeletal: Normal muscle bulk and tone  Skin: Normal color and turgor     ASSESSMENT  Recurrent endometrial adenocarcinoma confined to the mid vagina anterior wall   2 cm size.     RECOMMENDATION:  We would recommend radiotherapy in an effort to provide permanent local control and hopefully cure for this small, presumably localized recurrence in the mid vagina.    We are recommending an examination under anesthesia and repeat MRI with US gel to get the best delineation of the extent of tumor.      We explained to her that the treatment consists of 5 weeks of external beam radiation therapy to the pelvis followed by vaginal brachytherapy. At this time, we're unsure whether brachytherapy will will be done with a vaginal cylinder treatment or with interstitial needles.    The repeat MRI and EUA may help clarify that.      . The patient and her sister both asked appropriate questions, which were answered to their satisfaction, and verbalized understanding. Short and long term risks were discussed in detail including possible bowel, bladder bone or vaginal damage.        Informed consent was signed. We'll plan for her to return for CT simulation  after placement of the gold fiducial markers, which will most likely take place this Friday.      Ms. Anglin was seen and discussed with staff, Dr. Ambriz.    Thank you for allowing us to participate in this patient's care. Please feel free to call with any questions or concerns.    Lazaro Nichols MD  Resident   Department of Radiation Oncology   [facility]    I was personally present with the resident during the history and exam.  I   discussed the case with the resident and agree with the findings and plan of care as documented in the resident's note. The risks and benefits of radiotherapy were discussed with the patient.  All questions were answered.  Please do not hesitate to call me if you have questions or concerns    Hailey Ambriz MD  Pager  [phone]  Clinic   [phone]  [facility]        CC  Patient Care Team:  Mikel Wells MD as PCP - General (Family Practice)  Chris Roberson as Referring Physician (OB/Gyn)  VARSHA LEE       Again, thank you for allowing me to participate in the care of your patient.      Sincerely,    Hailey Ambriz MD

## 2017-07-20 ENCOUNTER — OFFICE VISIT (OUTPATIENT)
Dept: SURGERY | Facility: CLINIC | Age: 47
End: 2017-07-20

## 2017-07-20 ENCOUNTER — ALLIED HEALTH/NURSE VISIT (OUTPATIENT)
Dept: SURGERY | Facility: CLINIC | Age: 47
End: 2017-07-20

## 2017-07-20 VITALS
HEIGHT: 64 IN | HEART RATE: 79 BPM | BODY MASS INDEX: 49.2 KG/M2 | DIASTOLIC BLOOD PRESSURE: 81 MMHG | TEMPERATURE: 98.4 F | OXYGEN SATURATION: 94 % | WEIGHT: 288.2 LBS | RESPIRATION RATE: 16 BRPM | SYSTOLIC BLOOD PRESSURE: 138 MMHG

## 2017-07-20 DIAGNOSIS — Z01.818 PREOP EXAMINATION: Primary | ICD-10-CM

## 2017-07-20 PROBLEM — Z20.7 SCABIES EXPOSURE: Status: ACTIVE | Noted: 2017-07-20

## 2017-07-20 ASSESSMENT — LIFESTYLE VARIABLES: TOBACCO_USE: 0

## 2017-07-20 NOTE — H&P
"    Pre-Operative H & P     Date of Encounter: 7/20/2017  Primary Care Physician:  Mikel Wells    CC: Recurrence of endometrial cancer.      HPI:  Mary Anglin is a 47 year old female who presents for pre-operative H & P in preparation for Pelvic Exam, Insertion Radiation Seed Markers on 7/21/17 with Dr. Jarrod Kessler at [facility].     The patient was evaluated by Dr. Gore on 6/28/17 in regards to recurrent endometrial cancer.  The patient has a history of endometrial cancer in the past, and underwent total abdominal hysterectomy with bilateral sapingo-oophorectomy in 2004.  She recently noted vaginal bleeding, and examination revealed a vaginal lesion.  This was positive for recurrent endometrial cancer.  At the 6/28 appointment, plans were made for further evaluation with PET and MRI.  These studies did not show any evidence of distant disease.  She was reevaluated on 7/17, and localized radiation therapy was recommended.  The patient was evaluated by Dr. Ambriz with Radiation Oncology on 7/19, and they recommended   \"radiotherapy in an effort to provide permanent local control and hopefully cure for this small, presumably localized recurrence in the mid vagina.   Arrangements are now being made for the above procedures.    History is obtained from the patient and the medical record.    Past Medical History:  Past Medical History:   Diagnosis Date     Cellulitis      Depression      Diabetes (H)      Endometrial cancer (H)      GERD (gastroesophageal reflux disease)      HLD (hyperlipidemia)      Lower extremity edema      Morbid obesity (H)      MRSA (methicillin resistant Staphylococcus aureus) infection      Past Surgical History:  Past Surgical History:   Procedure Laterality Date     C TOTAL ABDOM HYSTERECTOMY  2004     COLONOSCOPY       HERNIORRHAPHY UMBILICAL       SALPINGO-OOPHORECTOMY BILATERAL       TONSILLECTOMY       UPPER GI " ENDOSCOPY       Hx of Blood transfusions/reactions: Denies.     Hx of abnormal bleeding or anti-platelet use: Denies.    Menstrual history: No LMP recorded. Patient has had a hysterectomy.    Steroid use in the last year: Denies.    Personal or FH of difficulty with anesthesia: Denies.    Prior to admission medications  Current Outpatient Prescriptions   Medication Sig Dispense Refill     albuterol (PROAIR HFA/PROVENTIL HFA/VENTOLIN HFA) 108 (90 BASE) MCG/ACT Inhaler Inhale 2 puffs into the lungs every 4 hours as needed        atorvastatin (LIPITOR) 10 MG tablet Take 0.5 tablets (5 mg total) by mouth every night at bedtime.       clobetasol (TEMOVATE) 0.05 % cream apply topically PRN       clotrimazole (LOTRIMIN) 1 % cream Apply to affected area 2 times a day PRN        docusate sodium (COLACE) 100 MG capsule Take 100 mg by mouth 2 times daily        escitalopram (LEXAPRO) 20 MG tablet Take 20 mg by mouth At Bedtime        furosemide (LASIX) 40 MG tablet Take 40 mg by mouth 2 times daily        Melatonin 5 MG TBDP Take 2.5-5 mg by mouth nightly as needed        metFORMIN (GLUCOPHAGE) 500 MG tablet Take 500 mg by mouth daily (with dinner)        mupirocin (BACTROBAN) 2 % ointment Apply to affected area 2 times a day as needed for other (Specify) (rash on back of neck)       blood glucose monitoring (ACCU-CHEK BRADLEY PLUS) meter device kit 1 Device       blood glucose monitoring (ACCU-CHEK BRADLEY PLUS) test strip 1 time per day       doxepin (SINEQUAN) 10 MG capsule Take 20 mg by mouth At Bedtime        Lancet Device MISC As Directed       blood glucose monitoring (SOFTCLIX) lancets 1 each       Allergies  Allergies   Allergen Reactions     Penicillin G Other (See Comments)     Azithromycin Rash     Benzonatate Unknown     Cephalexin Unknown     Sulfamethoxazole W/Trimethoprim Rash     Bupropion Other (See Comments)     No rash     Clindamycin Other (See Comments)     Aspirin Other (See Comments)     Pt refuses to take  ASA     Social History  Social History     Social History     Marital status: Single     Spouse name: N/A     Number of children: N/A     Years of education: N/A     Occupational History     Not on file.     Social History Main Topics     Smoking status: Never Smoker     Smokeless tobacco: Never Used     Alcohol use No     Drug use: No     Sexual activity: Not Currently     Other Topics Concern     Not on file     Social History Narrative     Family History  Family History   Problem Relation Age of Onset     Skin Cancer Father      KIDNEY DISEASE Father      Required hemodialysis     HEART DISEASE Maternal Grandfather      MENTAL ILLNESS Mother      Catatonia after loss of mother     Breast Cancer Paternal Grandmother      CEREBROVASCULAR DISEASE Maternal Grandmother      Hypertension Maternal Grandmother      DIABETES Maternal Grandmother      DIABETES Paternal Grandfather      Review of Systems  Functional status: Independent in ADL's.  3 METS.     The complete review of systems is negative other than noted in the HPI or here.   Constitutional: Denies recent changes in weight, or fevers/chills.  Reports that she has been having difficulty sleeping at night because she takes too many naps during the day.  Eyes: Glasses for vision correction.  No recent vision changes.  EENT: Denies recent changes in hearing, mouth pain, or difficulty swallowing.  Cardiovascular: Denies chest pain, LOZANO or orthopnea, or palpitations.  Respiratory: Denies shortness of breath or significant cough.    GI: Denies nausea/vomiting or diarrhea.  Reports a tendency for constipation.      : Denies dysuria.    Musculoskeletal: Denies joint pain or swelling.    Skin: Chronic superficial abrasions scattered over her abdomen that she will pick and scratch.    Hematologic: Denies easy bruising or bleeding.    Neurologic: Denies migraines, seizures, dizziness, numbness/tingling.  Reports vertigo, worse when going from a lying to a seated  "position.  Psychiatric: Denies changes in mood or affect.      /81  Pulse 79  Temp 98.4  F (36.9  C) (Oral)  Resp 16  Ht 1.626 m (5' 4\")  Wt 130.7 kg (288 lb 3.2 oz)  SpO2 94%  BMI 49.47 kg/m2    288 lbs 3.2 oz  5' 4\"   Body mass index is 49.47 kg/(m^2).    Physical Exam  Constitutional: Patient awake, seated upright in a chair, in no apparent distress.  Appears stated age.  Eyes: Pupils equal, round and reactive to light.  Extra ocular muscles intact. Sclera clear.  Conjunctiva normal.  HENT: Head normocephalic.  Oral pharynx intact with moist mucous membranes.  Dentition moderate.  No thyromegaly appreciated.   Respiratory: Lung sounds clear to auscultation bilaterally.  No rales, rhonchi, or wheezing noted.    Cardiovascular: S1, S2, regular rate and rhythm.  No murmurs, rubs, or gallops noted. Radial and pedal pulses palpable, bilaterally.  1+ pedal edema noted, bilaterally, extending up into the calves.  GI: Bowel sounds present.  Abdomen obese, soft, non-tender to light palpation.    Genitourinary: Exam deferred.  Lymph/Hematologic: No cervical or supraclavicular lymphadenopathy noted.  No excessive bruising noted.    Skin: Color appropriate for race, warm, dry.  Small scabbed lesion noted over the left side of the patient's face.  Scattered small lesions and scratches, covered with scabs scattered over the patient's upper extremities and abdomen.  Skin changes consistent with chronic venous status and cellulitis noted over the lower extremities.  No signs/symptoms of an infectious process noted.  No indications of scabies infestation noted.    Musculoskeletal: Limited extension of the neck.  No redness, warmth, or swelling of the joints noted. Gross motor strength is normal.    Neurologic: Alert, oriented to name, place and time. Cranial nerves II-XII are grossly intact. Gait is altered due to obesity.    Neuropsychiatric: Calm, cooperative. Normal affect.     Labs:  5/22/17: Na 141; K 4; Cl 101; " Glu 117; BUN 12; Cr 0.8; Ca 9.3; HgbA1c 7  Imagin/15/17 CT Chest:  FINDINGS:   No definite consolidations, effusions, or pulmonary edema are noted. No discrete pulmonary nodules are visualized.  The tracheobronchial tree is patent. Heart size is enlarged.No adenopathy is noted in the mediastinum or hilum. The central pulmonary artery and the ascending aorta have a normal caliber.Visualized portions of the lower neck include symmetric lobes of the thyroid. No adenopathy is noted in the lower neck regions. Symmetric, enlarged lymph nodes are visualized in the axillary regions. Osseous structures have spondylolysis at multiple levels in the spine.     IMPRESSION:   1. Mild cardiomegaly.  2.Otherwise normal CT chest.      6/15/17 CT Abdomen/Pelvis:.  FINDINGS:   There is diffuse low attenuation in the liver, compatible with hepatic steatosis. The spleen, pancreas, and right adrenal appear normal. The left adrenal contains a 1.2 cm mass in the lateral limb (I-109) which has attenuation values too high to represent a typical adenoma (I-109), HU up to 23 HU. The pancreas has a normal appearance. There is a stable 1.0 cm   celiac lymph node (I-107), sub cm lymph nodes in the left periaortic region, and there is a stable enlarged 1.0 cm lymph node posterior to the right renal vein (I-122).   IMPRESSION:  1. Post therapeutic changes and stable adenopathy.  2. No areas with definite malignancy recurrence.     CT PELVIS FINDINGS:  There is no evidence of small or of large bowel obstruction. The terminal ileum contains oral contrast and has a normal caliber. Appendix is not identified, but no pericecal inflammation is noted. There is sigmoid colonic diverticulosis without diverticulitis. Osseous structures have   normal mineralization and appearance. The uterus appears surgically absent. There is no obvious adenopathy in the pelvis but there are surgical clips adjacent to the common iliac arteries, likely from previous  lymph node dissection.   IMPRESSION:  1. Sigmoid colonic diverticulosis without diverticulitis.    Lab results personally reviewed by this provider.      Outside records from [facility] reviewed.    Assessment and Plan  Mary Anglin is a 47 year old female scheduled to undergo Pelvic Exam, Insertion Radiation Seed Markers on 7/21/17 with Dr. Jarrod Kessler.    She has the following specific operative considerations:   1.  Vertigo with PONV risk score of 2: Consider use of antiemetic agents in the perioperative period.    2.  Diabetes: Patient instructed to hold Metformin.  Recommend close monitoring of blood glucose levels throughout the perioperative period.  3.  Obesity: Recommend careful positioning to prevent airway/ventilatory compromise, or tissue injury.    4.  History of MRSA and recent scabies exposure without indication of scabies infestation: Patient has been staying with her sister for the past week. Her sister reports that her children have had an itchy rash since around the time school got out.  The children were diagnosed with scabies on 7/19.  The patient does not exhibit any signs of scabies infestation.  Spoke with Infection Control, due to history of MRSA and recent scabies exposure, patient is to be placed in contact isolation.    5.  Increased risk for VTE: Recommend that mechanical VTE prophylaxis be initiated during the perioperative period.      Revised Cardiac Risk Index: 0.4% risk of major adverse cardiac event.  VTE risk: 3%  SATYA risk: Low  PONV risk score= 2.  (If > 2, anti-emetic intervention is recommended.)  Anesthesia considerations: Refer to PAC assessment in the anesthesia records.    Caryl Pena NP  Preoperative Assessment Center  [facility]  Phone: [phone]  Fax: [phone]

## 2017-07-20 NOTE — ANESTHESIA PREPROCEDURE EVALUATION
Anesthesia Evaluation     . Pt has had prior anesthetic. Type: General and MAC    History of anesthetic complications    Vertigo, worse when she goes from a lying to a seated position.      ROS/MED HX    ENT/Pulmonary:     (+)SATYA risk factors obese, allergic rhinitis, , . .   (-) tobacco use   Neurologic:  - neg neurologic ROS   (+)other neuro Vertigo    Cardiovascular:     (+) Dyslipidemia, ----. : . . . :. .      (-) taking anticoagulants/antiplatelets   METS/Exercise Tolerance:  3 - Able to walk 1-2 blocks without stopping   Hematologic:  - neg hematologic  ROS       Musculoskeletal:   (+) , , other musculoskeletal- Scoliosis of the spine.        GI/Hepatic: Comment: GERD is controlled with diet.    (+) GERD Other,       Renal/Genitourinary:  - ROS Renal section negative       Endo:     (+) type II DM Last HgA1c: 7 date: 5/22/17 Not using insulin - not using insulin pump Normal glucose range: 130s not previously admitted for DM/DKA Obesity, .   (-) Type I DM   Psychiatric:     (+) psychiatric history depression      Infectious Disease: Comment: Patient has been staying with her sister for the past week. Her sister reports that her children have had an itchy rash since around the time school got out.  The children were diagnosed with scabies on 7/19.  The patient does not exhibit any signs of scabies infestation.  Spoke with Infection Control, due to history of MRSA and recent scabies exposure, patient is to be placed in contact isolation.    (+) MRSA, Other Infectious Disease See comments.      Malignancy:   (+) Malignancy History of Other  Other CA History of endometrial cancer, S/P JEMAL-BSO in 2004.  Recurrence identified in June.   Active status post Surgery         Other:    - neg other ROS                 Physical Exam      Airway   Mallampati: II  TM distance: >3 FB  Neck ROM: limited    Dental   (+) missing    Cardiovascular   Rhythm and rate: regular and normal  (+) peripheral edema   (-) no  murmur    Pulmonary    breath sounds clear to auscultation    Other findings: 5/22/17: Na 141; K 4; Cl 101; Glu 117; BUN 12; Cr 0.8; Ca 9.3; HgbA1c 7    6/15/17 CT Chest:  FINDINGS:   No definite consolidations, effusions, or pulmonary edema are noted. No discrete pulmonary nodules are visualized.  The tracheobronchial tree is patent. Heart size is enlarged.No adenopathy is noted in the mediastinum or hilum. The central pulmonary artery and the ascending aorta have a normal caliber.Visualized portions of the lower neck include symmetric lobes of the thyroid. No adenopathy is noted in the lower neck regions. Symmetric, enlarged lymph nodes are visualized in the axillary regions. Osseous structures have spondylolysis at multiple levels in the spine.   IMPRESSION:   1. Mild cardiomegaly.  2.Otherwise normal CT chest.      6/15/17 CT Abdomen/Pelvis:.  FINDINGS:   There is diffuse low attenuation in the liver, compatible with hepatic steatosis. The spleen, pancreas, and right adrenal appear normal. The left adrenal contains a 1.2 cm mass in the lateral limb (I-109) which has attenuation values too high to represent a typical adenoma (I-109), HU up to 23 HU. The pancreas has a normal appearance. There is a stable 1.0 cm   celiac lymph node (I-107), sub cm lymph nodes in the left periaortic region, and there is a stable enlarged 1.0 cm lymph node posterior to the right renal vein (I-122).   IMPRESSION:  1. Post therapeutic changes and stable adenopathy.  2. No areas with definite malignancy recurrence.     CT PELVIS FINDINGS:  There is no evidence of small or of large bowel obstruction. The terminal ileum contains oral contrast and has a normal caliber. Appendix is not identified, but no pericecal inflammation is noted. There is sigmoid colonic diverticulosis without diverticulitis. Osseous structures have   normal mineralization and appearance. The uterus appears surgically absent. There is no obvious adenopathy in the  pelvis but there are surgical clips adjacent to the common iliac arteries, likely from previous lymph node dissection.   IMPRESSION:  1. Sigmoid colonic diverticulosis without diverticulitis.             PAC Discussion and Assessment    ASA Classification: 3  Case is suitable for: [place]  Anesthetic techniques and relevant risks discussed: MAC with GA as backup  Invasive monitoring and risk discussed: No  Types:   Possibility and Risk of blood transfusion discussed: No  NPO instructions given:   Additional anesthetic preparation and risks discussed:   Needs early admission to pre-op area:   Other:     PAC Resident/NP Anesthesia Assessment:  Mary Anglin is a 47 year old female scheduled to undergo Pelvic Exam, Insertion Radiation [place] on 7/21/17 with Dr. Jarrod Kessler.    She has the following specific operative considerations:   1.  Vertigo with PONV risk score of 2: Consider use of antiemetic agents in the perioperative period.    2.  Diabetes: Patient instructed to hold Metformin.  Recommend close monitoring of blood glucose levels throughout the perioperative period.  3.  Obesity: Recommend careful positioning to prevent airway/ventilatory compromise, or tissue injury.    4.  History of MRSA and recent scabies exposure without indication of scabies infestation: Patient has been staying with her sister for the past week. Her sister reports that her children have had an itchy rash since around the time school got out.  The children were diagnosed with scabies on 7/19.  The patient does not exhibit any signs of scabies infestation.  Spoke with Infection Control, due to history of MRSA and recent scabies exposure, patient is to be placed in contact isolation.    5.  Increased risk for VTE: Recommend that mechanical VTE prophylaxis be initiated during the perioperative period.      Revised Cardiac Risk Index: 0.4% risk of major adverse cardiac event.  VTE risk: 3%  SATYA risk: Low  PONV risk score= 2.   (If > 2, anti-emetic intervention is recommended.)  Anesthesia considerations: Refer to PAC assessment in the anesthesia records.      Reviewed and Signed by PAC Mid-Level Provider/Resident  Mid-Level Provider/Resident: Caryl Pena CNP  Date: 7/20/17  Time: 1855    Attending Anesthesiologist Anesthesia Assessment:        Anesthesiologist:   Date:   Time:   Pass/Fail:   Disposition:     PAC Pharmacist Assessment:        Pharmacist:   Date:   Time:      Anesthesia Plan      History & Physical Review  History and physical reviewed and following examination; no interval change.    ASA Status:  3 .    NPO Status:  > 2 hours    Plan for MAC with Intravenous induction. Maintenance will be Balanced.    PONV prophylaxis:  Ondansetron (or other 5HT-3) and Promethazine or metoclopramide       Postoperative Care  Postoperative pain management:  IV analgesics.      Consents  Anesthetic plan, risks, benefits and alternatives discussed with: .  Use of blood products discussed: No .   .                         .

## 2017-07-20 NOTE — PATIENT INSTRUCTIONS
Preparing for Your Surgery      Name:  Mary Anglin   MRN:  [id number]   :  1970   Today's Date:  2017     Arriving for surgery:  Surgery date:    Surgery time:  11:00AM    appt with Dr Kessler at 10:00AM  Arrival time:  1:00PM  Please come to:       [facility] [address]  [address] [place]  [address]    -   parking is available in front of the hospital from 5:15 am to 8:00 pm    -  Stop at the Information Desk in the lobby    -   Inform the information person that you are here for surgery. An escort to 3c will be provided. If you would not like an escort, please proceed to 3C on the 3rd floor. [phone]     What can I eat or drink?  -  You may have solid food or milk products until 8 hours prior to your surgery.-- Do not eat food after 5:00AM on the morning of surgery   -  You may have water, apple juice or 7up/Sprite until 2 hours prior to your surgery.-- OK to have CLEAR liquids until 11:00AM on the morning of surgery     Which medicines can I take?  -  Do NOT take these medications in the morning, the day of surgery:    No creams on skin morning of surgery   Do not take laxatives on the morning of surgery   Lasix   metforman  Do not take any vitamins/minerals/supplements on the morning of surgery  Do not take aspirin and do not take ibuprofen now until after surgery       -  Please take these medications the day of surgery:    Use inhaler as needed and please bring inhaler to the hospital                 How do I prepare myself?  -  Take two showers: one the night before surgery; and one the morning of surgery.         Use Scrubcare or Hibiclens to wash from neck down.  You may use your own shampoo and conditioner. No other hair products.   -  Do NOT use lotion, powder, deodorant, or antiperspirant the day of your surgery.  -  Do NOT wear any makeup, fingernail polish or jewelry.  -Do not bring your own medications to the hospital, except  for inhalers and eye drops.  -  Bring your ID and insurance card.    Questions or Concerns:  If you have questions or concerns, please call the  Preoperative Assessment Center, Monday-Friday 7AM-7PM:  [phone]            AFTER YOUR SURGERY  Breathing exercises   Breathing exercises help you recover faster. Take deep breaths and let the air out slowly. This will:     Help you wake up after surgery.    Help prevent complications like pneumonia.  Preventing complications will help you go home sooner.   We may give you a breathing device (incentive spirometer) to encourage you to breathe deeply.   Nausea and vomiting   You may feel sick to your stomach after surgery; if so, let your nurse know.    Pain control:  After surgery, you may have pain. Our goal is to help you manage your pain. Pain medicine will help you feel comfortable enough to do activities that will help you heal.  These activities may include breathing exercises, walking and physical therapy.   To help your health care team treat your pain we will ask: 1) If you have pain  2) where it is located 3) describe your pain in your words  Methods of pain control include medications given by mouth, vein or by nerve block for some surgeries.  We may give you a pain control pump that will:  1) Deliver the medicine through a tube placed in your vein  2) Control the amount of medicine you receive  3) Allow you to push a button to deliver a dose of pain medicine  Sequential Compression Device (SCD) or Pneumo Boots:  You may need to wear SCD S on your legs or feet. These are wraps connected to a machine that pumps in air and releases it. The repeated pumping helps prevent blood clots from forming.

## 2017-07-20 NOTE — MR AVS SNAPSHOT
After Visit Summary   2017    Mary Anglin    MRN: [id number]           Patient Information     Date Of Birth          1970        Visit Information        Provider Department      2017 6:30 PM Rn, [facility] Preoperative Assessment Center        Care Instructions    Preparing for Your Surgery      Name:  Mary Anglin   MRN:  [id number]   :  1970   Today's Date:  2017     Arriving for surgery:  Surgery date:    Surgery time:  11:00AM    appt with Dr Kessler at 10:00AM  Arrival time:  1:00PM  Please come to:       [facility] [address]  [address] [place]  [address]    -   parking is available in front of the hospital from 5:15 am to 8:00 pm    -  Stop at the Information Desk in the lobby    -   Inform the information person that you are here for surgery. An escort to 3c will be provided. If you would not like an escort, please proceed to 3C on the 3rd floor. [phone]     What can I eat or drink?  -  You may have solid food or milk products until 8 hours prior to your surgery.-- Do not eat food after 5:00AM on the morning of surgery   -  You may have water, apple juice or 7up/Sprite until 2 hours prior to your surgery.-- OK to have CLEAR liquids until 11:00AM on the morning of surgery     Which medicines can I take?  -  Do NOT take these medications in the morning, the day of surgery:    No creams on skin morning of surgery   Do not take laxatives on the morning of surgery   Lasix   metforman  Do not take any vitamins/minerals/supplements on the morning of surgery  Do not take aspirin and do not take ibuprofen now until after surgery       -  Please take these medications the day of surgery:    Use inhaler as needed and please bring inhaler to the hospital                 How do I prepare myself?  -  Take two showers: one the night before surgery; and one the morning of surgery.         Use Scrubcare or  Hibiclens to wash from neck down.  You may use your own shampoo and conditioner. No other hair products.   -  Do NOT use lotion, powder, deodorant, or antiperspirant the day of your surgery.  -  Do NOT wear any makeup, fingernail polish or jewelry.  -Do not bring your own medications to the hospital, except for inhalers and eye drops.  -  Bring your ID and insurance card.    Questions or Concerns:  If you have questions or concerns, please call the  Preoperative Assessment Center, Monday-Friday 7AM-7PM:  [phone]            AFTER YOUR SURGERY  Breathing exercises   Breathing exercises help you recover faster. Take deep breaths and let the air out slowly. This will:     Help you wake up after surgery.    Help prevent complications like pneumonia.  Preventing complications will help you go home sooner.   We may give you a breathing device (incentive spirometer) to encourage you to breathe deeply.   Nausea and vomiting   You may feel sick to your stomach after surgery; if so, let your nurse know.    Pain control:  After surgery, you may have pain. Our goal is to help you manage your pain. Pain medicine will help you feel comfortable enough to do activities that will help you heal.  These activities may include breathing exercises, walking and physical therapy.   To help your health care team treat your pain we will ask: 1) If you have pain  2) where it is located 3) describe your pain in your words  Methods of pain control include medications given by mouth, vein or by nerve block for some surgeries.  We may give you a pain control pump that will:  1) Deliver the medicine through a tube placed in your vein  2) Control the amount of medicine you receive  3) Allow you to push a button to deliver a dose of pain medicine  Sequential Compression Device (SCD) or Pneumo Boots:  You may need to wear SCD S on your legs or feet. These are wraps connected to a machine that pumps in air and releases it. The repeated pumping  helps prevent blood clots from forming.           Follow-ups after your visit        Your next 10 appointments already scheduled     Jul 20, 2017  6:30 PM CDT   (Arrive by 6:15 PM)   PAC RN ASSESSMENT with Brandie Pac Rn   [facility] Preoperative Assessment Center ([facility])    [address]  4th Floor  [facility] [address]   [phone]            Jul 21, 2017 10:00 AM CDT   TCT/SIM Suite Visit with Jarrod Kessler MD   Radiation Oncology Clinic ([facility])    [facility]  1st Floor  [address] [address]   [phone]            Jul 21, 2017 12:55 PM CDT   TCT/SIM Suite Visit with Jarrod Kessler MD   Radiation Oncology Clinic ([facility])    [facility]  1st Floor  [address] [address]   [phone]            Jul 21, 2017   Procedure with Jarrod Kessler MD   [facility] [place], Same Day Surgery (--)    [address] [facility] [address] [facility] [address] [phone]            Jul 21, 2017  3:00 PM CDT   TCT/SIM Suite Visit with Jarrod Kessler MD   Radiation Oncology Clinic ([facility])    [facility]  1st Floor  [address] [address] [phone]              Who to contact     Please call your clinic at [phone] to:    Ask questions about your health    Make or cancel appointments    Discuss your medicines    Learn about your test results    Speak to your doctor   If you have compliments or concerns about an experience at your clinic, or if you wish to file a complaint, please contact [facility] Patient Relations at [phone] or email us at [email or web address]         Additional Information About Your Visit        Vister[employer]art Information     [employer] is an electronic gateway that provides easy, online access to your medical records. With [employer], you can request a clinic appointment,  read your test results, renew a prescription or communicate with your care team.     To sign up for Cono-Chart visit the website at [email or web address]   You will be asked to enter the access code listed below, as well as some personal information. Please follow the directions to create your username and password.     Your access code is: [email or web address]  Expires: 2017 10:37 AM     Your access code will  in 90 days. If you need help or a new code, please contact your [facility] or call [phone] for assistance.        Care EveryWhere ID     This is your Care EveryWhere ID. This could be used by other organizations to access your [facility] medical records  [id number]         Blood Pressure from Last 3 Encounters:   17 138/81   17 122/70   17 130/83    Weight from Last 3 Encounters:   17 130.7 kg (288 lb 3.2 oz)   17 130.6 kg (288 lb)   17 131.7 kg (290 lb 4.8 oz)              Today, you had the following     No orders found for display       Primary Care Provider Office Phone # Fax #    Mikel Wells [phone]       [place] [address]        Equal Access to Services     ALEX ROSAS : Hadii aad ku hadasho Socollinsali, waaxda luqadaha, qaybta kaalmada adeegyada, loraine turner . So [facility] [phone].    ATENCIÓN: Si habla español, tiene a do disposición servicios gratuitos de asistencia lingüística. Gregor al [phone].    We comply with applicable federal civil rights laws and [place] laws. We do not discriminate on the basis of race, color, national origin, age, disability sex, sexual orientation or gender identity.            Thank you!     Thank you for choosing [facility] PREOPERAT[facility]MENT [facility]  for your care. Our goal is always to provide you with excellent care. Hearing back from our patients is one way we can continue to improve  our services. Please take a few minutes to complete the written survey that you may receive in the mail after your visit with us. Thank you!             Your Updated Medication List - Protect others around you: Learn how to safely use, store and throw away your medicines at www.disposemymeds.org.          This list is accurate as of: 7/20/17  6:00 PM.  Always use your most recent med list.                   Brand Name Dispense Instructions for use Diagnosis    ACCU-CHEK BRADLEY PLUS test strip   Generic drug:  blood glucose monitoring      1 time per day        albuterol 108 (90 BASE) MCG/ACT Inhaler    PROAIR HFA/PROVENTIL HFA/VENTOLIN HFA     Inhale 2 puffs into the lungs every 4 hours as needed        atorvastatin 10 MG tablet    LIPITOR     Take 0.5 tablets (5 mg total) by mouth every night at bedtime.        blood glucose monitoring lancets      1 each        blood glucose monitoring meter device kit      1 Device        clobetasol 0.05 % cream    TEMOVATE     apply topically PRN        clotrimazole 1 % cream    LOTRIMIN     Apply to affected area 2 times a day PRN        docusate sodium 100 MG capsule    COLACE     Take 100 mg by mouth 2 times daily        doxepin 10 MG capsule    SINEquan     Take 20 mg by mouth At Bedtime        escitalopram 20 MG tablet    LEXAPRO     Take 20 mg by mouth At Bedtime        furosemide 40 MG tablet    LASIX     Take 40 mg by mouth 2 times daily        Lancet Device Misc      As Directed        Melatonin 5 MG Tbdp      Take 2.5-5 mg by mouth nightly as needed        metFORMIN 500 MG tablet    GLUCOPHAGE     Take 500 mg by mouth daily (with dinner)        mupirocin 2 % ointment    BACTROBAN     Apply to affected area 2 times a day as needed for other (Specify) (rash on back of neck)

## 2017-07-21 ENCOUNTER — APPOINTMENT (OUTPATIENT)
Dept: RADIATION ONCOLOGY | Facility: CLINIC | Age: 47
End: 2017-07-21
Attending: RADIOLOGY
Payer: MEDICAID

## 2017-07-21 ENCOUNTER — HOSPITAL ENCOUNTER (OUTPATIENT)
Facility: CLINIC | Age: 47
Discharge: HOME | End: 2017-07-21
Attending: RADIOLOGY | Admitting: RADIOLOGY
Payer: MEDICAID

## 2017-07-21 ENCOUNTER — ANESTHESIA (OUTPATIENT)
Dept: SURGERY | Facility: CLINIC | Age: 47
End: 2017-07-21
Payer: MEDICAID

## 2017-07-21 VITALS
RESPIRATION RATE: 20 BRPM | OXYGEN SATURATION: 99 % | WEIGHT: 289.24 LBS | BODY MASS INDEX: 49.38 KG/M2 | DIASTOLIC BLOOD PRESSURE: 88 MMHG | HEIGHT: 64 IN | SYSTOLIC BLOOD PRESSURE: 150 MMHG | TEMPERATURE: 97.7 F

## 2017-07-21 DIAGNOSIS — C54.1 ENDOMETRIAL CANCER (H): Primary | ICD-10-CM

## 2017-07-21 DIAGNOSIS — C54.1 ENDOMETRIAL CANCER (H): ICD-10-CM

## 2017-07-21 LAB
ANION GAP SERPL CALCULATED.3IONS-SCNC: 5 MMOL/L (ref 3–14)
BUN SERPL-MCNC: 12 MG/DL (ref 7–30)
CALCIUM SERPL-MCNC: 8.9 MG/DL (ref 8.5–10.1)
CHLORIDE SERPL-SCNC: 108 MMOL/L (ref 94–109)
CO2 SERPL-SCNC: 26 MMOL/L (ref 20–32)
CREAT SERPL-MCNC: 0.79 MG/DL (ref 0.52–1.04)
ERYTHROCYTE [DISTWIDTH] IN BLOOD BY AUTOMATED COUNT: 12.9 % (ref 10–15)
GFR SERPL CREATININE-BSD FRML MDRD: 78 ML/MIN/1.7M2
GLUCOSE BLDC GLUCOMTR-MCNC: 119 MG/DL (ref 70–99)
GLUCOSE BLDC GLUCOMTR-MCNC: 132 MG/DL (ref 70–99)
GLUCOSE SERPL-MCNC: 130 MG/DL (ref 70–99)
HCT VFR BLD AUTO: 41.9 % (ref 35–47)
HGB BLD-MCNC: 13.7 G/DL (ref 11.7–15.7)
MCH RBC QN AUTO: 31.5 PG (ref 26.5–33)
MCHC RBC AUTO-ENTMCNC: 32.7 G/DL (ref 31.5–36.5)
MCV RBC AUTO: 96 FL (ref 78–100)
PLATELET # BLD AUTO: 133 10E9/L (ref 150–450)
POTASSIUM SERPL-SCNC: 4 MMOL/L (ref 3.4–5.3)
RBC # BLD AUTO: 4.35 10E12/L (ref 3.8–5.2)
SODIUM SERPL-SCNC: 139 MMOL/L (ref 133–144)
WBC # BLD AUTO: 6.5 10E9/L (ref 4–11)

## 2017-07-21 PROCEDURE — 36415 COLL VENOUS BLD VENIPUNCTURE: CPT | Performed by: NURSE PRACTITIONER

## 2017-07-21 PROCEDURE — 37000009 ZZH ANESTHESIA TECHNICAL FEE, EACH ADDTL 15 MIN: Performed by: RADIOLOGY

## 2017-07-21 PROCEDURE — 25000128 H RX IP 250 OP 636: Performed by: NURSE ANESTHETIST, CERTIFIED REGISTERED

## 2017-07-21 PROCEDURE — 25000128 H RX IP 250 OP 636: Performed by: ANESTHESIOLOGY

## 2017-07-21 PROCEDURE — 85027 COMPLETE CBC AUTOMATED: CPT | Performed by: NURSE PRACTITIONER

## 2017-07-21 PROCEDURE — 80048 BASIC METABOLIC PNL TOTAL CA: CPT | Performed by: NURSE PRACTITIONER

## 2017-07-21 PROCEDURE — 36000053 ZZH SURGERY LEVEL 2 EA 15 ADDTL MIN - UMMC: Performed by: RADIOLOGY

## 2017-07-21 PROCEDURE — 71000027 ZZH RECOVERY PHASE 2 EACH 15 MINS: Performed by: RADIOLOGY

## 2017-07-21 PROCEDURE — 37000008 ZZH ANESTHESIA TECHNICAL FEE, 1ST 30 MIN: Performed by: RADIOLOGY

## 2017-07-21 PROCEDURE — 82962 GLUCOSE BLOOD TEST: CPT

## 2017-07-21 PROCEDURE — 25000125 ZZHC RX 250: Performed by: NURSE ANESTHETIST, CERTIFIED REGISTERED

## 2017-07-21 PROCEDURE — 36000051 ZZH SURGERY LEVEL 2 1ST 30 MIN - UMMC: Performed by: RADIOLOGY

## 2017-07-21 PROCEDURE — 40000170 ZZH STATISTIC PRE-PROCEDURE ASSESSMENT II: Performed by: RADIOLOGY

## 2017-07-21 RX ORDER — PROPOFOL 10 MG/ML
INJECTION, EMULSION INTRAVENOUS CONTINUOUS PRN
Status: DISCONTINUED | OUTPATIENT
Start: 2017-07-21 | End: 2017-07-21

## 2017-07-21 RX ORDER — NALOXONE HYDROCHLORIDE 0.4 MG/ML
.1-.4 INJECTION, SOLUTION INTRAMUSCULAR; INTRAVENOUS; SUBCUTANEOUS
Status: DISCONTINUED | OUTPATIENT
Start: 2017-07-21 | End: 2017-07-21 | Stop reason: HOSPADM

## 2017-07-21 RX ORDER — ONDANSETRON 2 MG/ML
4 INJECTION INTRAMUSCULAR; INTRAVENOUS EVERY 30 MIN PRN
Status: DISCONTINUED | OUTPATIENT
Start: 2017-07-21 | End: 2017-07-21 | Stop reason: HOSPADM

## 2017-07-21 RX ORDER — FENTANYL CITRATE 50 UG/ML
INJECTION, SOLUTION INTRAMUSCULAR; INTRAVENOUS PRN
Status: DISCONTINUED | OUTPATIENT
Start: 2017-07-21 | End: 2017-07-21

## 2017-07-21 RX ORDER — SODIUM CHLORIDE, SODIUM LACTATE, POTASSIUM CHLORIDE, CALCIUM CHLORIDE 600; 310; 30; 20 MG/100ML; MG/100ML; MG/100ML; MG/100ML
INJECTION, SOLUTION INTRAVENOUS CONTINUOUS
Status: DISCONTINUED | OUTPATIENT
Start: 2017-07-21 | End: 2017-07-21 | Stop reason: HOSPADM

## 2017-07-21 RX ORDER — LIDOCAINE 40 MG/G
CREAM TOPICAL
Status: DISCONTINUED | OUTPATIENT
Start: 2017-07-21 | End: 2017-07-21 | Stop reason: HOSPADM

## 2017-07-21 RX ORDER — PROPOFOL 10 MG/ML
INJECTION, EMULSION INTRAVENOUS PRN
Status: DISCONTINUED | OUTPATIENT
Start: 2017-07-21 | End: 2017-07-21

## 2017-07-21 RX ORDER — MEPERIDINE HYDROCHLORIDE 25 MG/ML
12.5 INJECTION INTRAMUSCULAR; INTRAVENOUS; SUBCUTANEOUS
Status: DISCONTINUED | OUTPATIENT
Start: 2017-07-21 | End: 2017-07-21 | Stop reason: HOSPADM

## 2017-07-21 RX ORDER — ONDANSETRON 4 MG/1
4 TABLET, ORALLY DISINTEGRATING ORAL EVERY 30 MIN PRN
Status: DISCONTINUED | OUTPATIENT
Start: 2017-07-21 | End: 2017-07-21 | Stop reason: HOSPADM

## 2017-07-21 RX ORDER — FENTANYL CITRATE 50 UG/ML
25-50 INJECTION, SOLUTION INTRAMUSCULAR; INTRAVENOUS EVERY 5 MIN PRN
Status: DISCONTINUED | OUTPATIENT
Start: 2017-07-21 | End: 2017-07-21 | Stop reason: HOSPADM

## 2017-07-21 RX ORDER — SODIUM CHLORIDE, SODIUM LACTATE, POTASSIUM CHLORIDE, CALCIUM CHLORIDE 600; 310; 30; 20 MG/100ML; MG/100ML; MG/100ML; MG/100ML
INJECTION, SOLUTION INTRAVENOUS CONTINUOUS PRN
Status: DISCONTINUED | OUTPATIENT
Start: 2017-07-21 | End: 2017-07-21

## 2017-07-21 RX ADMIN — SODIUM CHLORIDE, POTASSIUM CHLORIDE, SODIUM LACTATE AND CALCIUM CHLORIDE: 600; 310; 30; 20 INJECTION, SOLUTION INTRAVENOUS at 12:45

## 2017-07-21 RX ADMIN — FENTANYL CITRATE 50 MCG: 50 INJECTION, SOLUTION INTRAMUSCULAR; INTRAVENOUS at 13:26

## 2017-07-21 RX ADMIN — MIDAZOLAM HYDROCHLORIDE 2 MG: 1 INJECTION, SOLUTION INTRAMUSCULAR; INTRAVENOUS at 13:15

## 2017-07-21 RX ADMIN — PROPOFOL 20 MG: 10 INJECTION, EMULSION INTRAVENOUS at 13:51

## 2017-07-21 RX ADMIN — PROPOFOL 100 MCG/KG/MIN: 10 INJECTION, EMULSION INTRAVENOUS at 13:25

## 2017-07-21 RX ADMIN — SODIUM CHLORIDE, POTASSIUM CHLORIDE, SODIUM LACTATE AND CALCIUM CHLORIDE: 600; 310; 30; 20 INJECTION, SOLUTION INTRAVENOUS at 14:32

## 2017-07-21 RX ADMIN — PROPOFOL 30 MG: 10 INJECTION, EMULSION INTRAVENOUS at 13:24

## 2017-07-21 RX ADMIN — FENTANYL CITRATE 50 MCG: 50 INJECTION, SOLUTION INTRAMUSCULAR; INTRAVENOUS at 13:22

## 2017-07-21 NOTE — IP AVS SNAPSHOT
MRN:[id number]                      After Visit Summary   7/21/2017    Mary Anglin    MRN: [id number]           Thank you!     Thank you for choosing [facility] for your care. Our goal is always to provide you with excellent care. Hearing back from our patients is one way we can continue to improve our services. Please take a few minutes to complete the written survey that you may receive in the mail after you visit with us. Thank you!        Patient Information     Date Of Birth          1970        About your hospital stay     You were admitted on:  July 21, 2017 You last received care in the:  Same Day Surgery [facility]    You were discharged on:  July 21, 2017        Reason for your hospital stay       Gold seed placement for radiation                  Who to Call     For medical emergencies, please call 911.  For non-urgent questions about your medical care, please call your primary care provider or clinic, [phone]  For questions related to your surgery, please call your surgery clinic        Attending Provider     Provider Jarrod Cordoba MD Radiation Oncology       Primary Care Provider Office Phone # Fax #    Mikel Wells [phone]      After Care Instructions     Activity       Your activity upon discharge: activity as tolerated            Diet       Follow this diet upon discharge: Regular diet            Discharge Instructions       DISCHARGE INSTRUCTIONS:    NOTIFY  Please contact the gynecologic oncology clinic [phone]  for problems after discharge such as:  -Temperature > 101F, chills, rigors, dizziness  -Redness around or purulent drainage from wound  -Inability to tolerate diet, nausea or vomiting  -You stop passing gas, develop significant bloating, abdominal pain  -Have blood in stools/vomit  -Have severe diarrhea/constipation  -Any other questions or concerns.  - At nights (after 4:30pm), on weekends, or if  urgent, call [phone] and ask the  to speak with the on-call gynecologic oncology resident or fellow    Medication Instructions  Some of your medications may have changed. Please take only prescribed and resumed medications     FOLLOW-UP  1.  You will need to follow-up with gynecologic oncology clinic. [phone]    *For other appointments on [facility] and/or [facility] (with [facility] or [facility] provider or service): Call [phone] if you have not heard regarding these appointments within 7 days of discharge.                  Follow-up Appointments     Adult [facility] Follow-up and recommended labs and tests       Follow up for radiation and with Dr. Gore as previously scheduled.       Appointments on [facility] and/or [facility] (with [facility] or [facility] provider or service). Call [phone] if you haven't heard regarding these appointments within 7 days of discharge.                  Your next 10 appointments already scheduled     Jul 21, 2017  3:00 PM CDT   TCT/SIM Suite Visit with Jarrod Kessler MD   Radiation Oncology Clinic ([facility])    [facility]  1st Floor  [address] [address]   [phone]              Further instructions from your care team       Patient to be discharged to radiation oncology clinic for CT scan when cleared from PACU    [facility]  Same-Day Surgery   Adult Discharge Orders & Instructions     For 24 hours after surgery    1. Get plenty of rest.  A responsible adult must stay with you for at least 24 hours after you leave the hospital.   2. Do not drive or use heavy equipment.  If you have weakness or tingling, don't drive or use heavy equipment until this feeling goes away.  3. Do not drink alcohol.  4. Avoid strenuous or risky activities.  Ask for help when climbing stairs.   5. You may feel lightheaded.  IF so, sit for a few minutes before standing.  Have someone help  "you get up.   6. If you have nausea (feel sick to your stomach): Drink only clear liquids such as apple juice, ginger ale, broth or 7-Up.  Rest may also help.  Be sure to drink enough fluids.  Move to a regular diet as you feel able.  7. You may have a slight fever. Call the doctor if your fever is over 100 F (37.7 C) (taken under the tongue) or lasts longer than 24 hours.  8. You may have a dry mouth, a sore throat, muscle aches or trouble sleeping.  These should go away after 24 hours.  9. Do not make important or legal decisions.   Call your doctor for any of the followin.  Signs of infection (fever, growing tenderness at the surgery site, a large amount of drainage or bleeding, severe pain, foul-smelling drainage, redness, swelling).    2. It has been over 8 to 10 hours since surgery and you are still not able to urinate (pass water).    3.  Headache for over 24 hours.    To contact a doctor, call Dr Gore's office at [phone]   or:        [phone] and ask for the resident on call for radiation oncology (answered 24 hours a day)      Emergency Department:    [facility]: [phone]       (TTY for hearing impaired: [phone])              Pending Results     No orders found from 2017 to 2017.            Admission Information     Date & Time Provider Department Dept. Phone    2017 Jarrod Kessler MD Same Day Surgery [facility] [phone]      Your Vitals Were     Blood Pressure Temperature Respirations Height Weight Pulse Oximetry    140/80 97.5  F (36.4  C) (Oral) 20 1.626 m (5' 4\") 131.2 kg (289 lb 3.9 oz) 99%    BMI (Body Mass Index)                   49.65 kg/m2           Perfect [employer] Information     [employer] lets you send messages to your doctor, view your test results, renew your prescriptions, schedule appointments and more. To sign up, go to [email or web address] . Click on \"Log in\" on the left side of the screen, which will take you to the Welcome page. " "Then click on \"Sign up Now\" on the right side of the page.     You will be asked to enter the access code listed below, as well as some personal information. Please follow the directions to create your username and password.     Your access code is: [email or web address]  Expires: 2017 10:37 AM     Your access code will  in 90 days. If you need help or a new code, please call your [facility] clinic or [phone].        Care EveryWhere ID     This is your Care EveryWhere ID. This could be used by other organizations to access your [facility] medical records  [id number]        Equal Access to Services     [facility]: Hadii peggy Coon, della gibbs, jessica sam, loraine turner . So [facility] [phone].    ATENCIÓN: Si habla español, tiene a do disposición servicios gratuitos de asistencia lingüística. Llame al [phone].    We comply with applicable federal civil rights laws and [place] laws. We do not discriminate on the basis of race, color, national origin, age, disability sex, sexual orientation or gender identity.               Review of your medicines      CONTINUE these medicines which have NOT CHANGED        Dose / Directions    ACCU-CHEK BRADLEY PLUS test strip   Generic drug:  blood glucose monitoring        1 time per day   Refills:  0       albuterol 108 (90 BASE) MCG/ACT Inhaler   Commonly known as:  PROAIR HFA/PROVENTIL HFA/VENTOLIN HFA        Dose:  2 puff   Inhale 2 puffs into the lungs every 4 hours as needed   Refills:  0       atorvastatin 10 MG tablet   Commonly known as:  LIPITOR        Take 0.5 tablets (5 mg total) by mouth every night at bedtime.   Refills:  0       blood glucose monitoring meter device kit        Dose:  1 Device   1 Device   Refills:  0       clobetasol 0.05 % cream   Commonly known as:  TEMOVATE        apply topically PRN   Refills:  0       clotrimazole 1 % cream   Commonly known as:  LOTRIMIN        Apply to " affected area 2 times a day PRN   Refills:  0       docusate sodium 100 MG capsule   Commonly known as:  COLACE        Dose:  100 mg   Take 100 mg by mouth 2 times daily   Refills:  0       doxepin 10 MG capsule   Commonly known as:  SINEquan        Dose:  20 mg   Take 20 mg by mouth At Bedtime   Refills:  0       escitalopram 20 MG tablet   Commonly known as:  LEXAPRO        Dose:  20 mg   Take 20 mg by mouth At Bedtime   Refills:  0       furosemide 40 MG tablet   Commonly known as:  LASIX        Dose:  40 mg   Take 40 mg by mouth 2 times daily   Refills:  0       Lancet Device Misc        As Directed   Refills:  0       Melatonin 5 MG Tbdp        Dose:  2.5-5 mg   Take 2.5-5 mg by mouth nightly as needed   Refills:  0       metFORMIN 500 MG tablet   Commonly known as:  GLUCOPHAGE        Dose:  500 mg   Take 500 mg by mouth daily (with dinner)   Refills:  0       mupirocin 2 % ointment   Commonly known as:  BACTROBAN        Apply to affected area 2 times a day as needed for other (Specify) (rash on back of neck)   Refills:  0                Protect others around you: Learn how to safely use, store and throw away your medicines at www.disposemymeds.org.             Medication List: This is a list of all your medications and when to take them. Check marks below indicate your daily home schedule. Keep this list as a reference.      Medications           Morning Afternoon Evening Bedtime As Needed    ACCU-CHEK BRADLEY PLUS test strip   1 time per day   Generic drug:  blood glucose monitoring                                albuterol 108 (90 BASE) MCG/ACT Inhaler   Commonly known as:  PROAIR HFA/PROVENTIL HFA/VENTOLIN HFA   Inhale 2 puffs into the lungs every 4 hours as needed                                atorvastatin 10 MG tablet   Commonly known as:  LIPITOR   Take 0.5 tablets (5 mg total) by mouth every night at bedtime.                                blood glucose monitoring meter device kit   1 Device                                 clobetasol 0.05 % cream   Commonly known as:  TEMOVATE   apply topically PRN                                clotrimazole 1 % cream   Commonly known as:  LOTRIMIN   Apply to affected area 2 times a day PRN                                docusate sodium 100 MG capsule   Commonly known as:  COLACE   Take 100 mg by mouth 2 times daily                                doxepin 10 MG capsule   Commonly known as:  SINEquan   Take 20 mg by mouth At Bedtime                                escitalopram 20 MG tablet   Commonly known as:  LEXAPRO   Take 20 mg by mouth At Bedtime                                furosemide 40 MG tablet   Commonly known as:  LASIX   Take 40 mg by mouth 2 times daily                                Lancet Device Misc   As Directed                                Melatonin 5 MG Tbdp   Take 2.5-5 mg by mouth nightly as needed                                metFORMIN 500 MG tablet   Commonly known as:  GLUCOPHAGE   Take 500 mg by mouth daily (with dinner)                                mupirocin 2 % ointment   Commonly known as:  BACTROBAN   Apply to affected area 2 times a day as needed for other (Specify) (rash on back of neck)

## 2017-07-21 NOTE — OP NOTE
[facility]  Operative Note    Name: Mary Anglin  MRN: [id number]  : 1970  Date of Surgery: 2017    Pre-operative Diagnosis: 47 year old; recurrent endometrial cancer  Post-operative Diagnosis: Same, s/p EUA, gold seed placement  Procedure(s):   1. Exam under anesthesia   2. Gold seed placement     Surgeon: Abiodun Gore MD,  Jarrod Kessler MD  Assistants: Lazaro Nichols, Laine Benavides MD    Anesthesia: MAC  EBL: 2 mL   Urine Output: none  Fluids: 700 mL crystalloid    Specimens: None  Complications: None apparent  Indications: Mary Anglin is a 47 year old old with recurrent endometrial cancer with vaginal recurrence. PET scan and MRI did not show any evidence of distant disease. Discussed the best treatment option would be localized EBRT. The risks and benefits of and alternatives to the procedure were discussed with the patient, all questions were answered and written informed consent was obtained.       Findings: Exam under anesthesia revealed normal appearing external genitalia and urethral meatus. Speculum exam revealed an approximately 2x1 cm friable mass at the anterior vagina just proximal to the introitus. Vaginal cuff appeared normal without any masses. Small amount of vaginal bleeding with speculum insertion. Bimanual exam without masses or nodularity.     Procedure Details: The patient was taken to the OR where she was placed in the dorsal lithotomy position with feet in Fabricio stirrups. MAC was administered. The patient was prepped and draped in the usual sterile fashion.    An exam under anesthesia was performed as noted above. A total of 2 gold seeds were placed at the most proximal and distal margins of the mass (12 and 6 o'clock on the mass). 1 gold seed was lost during placement. There were no complications.     Dr. Gore was present for the procedure. The patient tolerated the procedure well, was extubated in the OR, and transferred to  the PACU in stable condition.     Abiodun Gore MD, MS    Department of Obstetrics and Gynecology   Division of Gynecologic Oncology   [facility]  Phone: [phone]

## 2017-07-21 NOTE — OP NOTE
OPERATIVE NOTE      PREOPERATIVE DIAGNOSIS: Recurrent endometrial cancer      POSTOPERATIVE DIAGNOSIS:  Same   PROCEDURE PERFORMED:   1. Exam under anesthesia.    2. Placement of gold fiducial markers       SURGEONS: Jarrod Kessler MD Radiation Oncology; Abiodun Gore MD, Gynecologic Oncology  ASSISTANT: MD Laine Cates MD  ANESTHESIA: General, endotracheal tube.    COMPLICATIONS: None.    ESTIMATED BLOOD LOSS: <10 cc  INTRAVENOUS FLUIDS: 700 cc LR  URINE OUTPUT: 0 cc     OPERATIVE FINDINGS: Normal external genitalia. On EUA there was a friable mass just proximal to the introitus in the suburethral region extending from 11:00 to 2-3:00 position, measuring approximately 1 cm in length. There was some blood noted at the vaginal cuff. No nodules or abnormalities noted at the cuff mucosa or elsewhere along the vaginal wall.       DESCRIPTION OF OPERATIVE PROCEDURE: The patient was brought back to the operating room wearing Pneumoboots and identified to be herself. The patient was placed under MAC anesthesia She was placed on the dorsal lithotomy position.The patient was prepped and draped in the usual sterile fashion An exam under anesthesia was performed with the above findings. Attempted placement of three gold fiducial markers. The first was placed at the distal border of the tumor; the second was lost during attempted insertion; the third was placed at the proximal border of the tumor.     DISPOSITION: Patient to be discharged to radiation oncology clinic for repeat CT scan after she is clear to be discharged from PACU.       I was present during the entire procedure.    Jarrod Kessler MD

## 2017-07-21 NOTE — DISCHARGE INSTRUCTIONS
Patient to be discharged to radiation oncology clinic for CT scan when cleared from PACU    [facility] [place]  Same-Day Surgery   Adult Discharge Orders & Instructions     For 24 hours after surgery    1. Get plenty of rest.  A responsible adult must stay with you for at least 24 hours after you leave the hospital.   2. Do not drive or use heavy equipment.  If you have weakness or tingling, don't drive or use heavy equipment until this feeling goes away.  3. Do not drink alcohol.  4. Avoid strenuous or risky activities.  Ask for help when climbing stairs.   5. You may feel lightheaded.  IF so, sit for a few minutes before standing.  Have someone help you get up.   6. If you have nausea (feel sick to your stomach): Drink only clear liquids such as apple juice, ginger ale, broth or 7-Up.  Rest may also help.  Be sure to drink enough fluids.  Move to a regular diet as you feel able.  7. You may have a slight fever. Call the doctor if your fever is over 100 F (37.7 C) (taken under the tongue) or lasts longer than 24 hours.  8. You may have a dry mouth, a sore throat, muscle aches or trouble sleeping.  These should go away after 24 hours.  9. Do not make important or legal decisions.   Call your doctor for any of the followin.  Signs of infection (fever, growing tenderness at the surgery site, a large amount of drainage or bleeding, severe pain, foul-smelling drainage, redness, swelling).    2. It has been over 8 to 10 hours since surgery and you are still not able to urinate (pass water).    3.  Headache for over 24 hours.    To contact a doctor, call Dr Gore's office at [phone]   or:        [phone] and ask for the resident on call for radiation oncology (answered 24 hours a day)      Emergency Department:    [facility]: [phone]       (TTY for hearing impaired: [phone])

## 2017-07-21 NOTE — PROGRESS NOTES
Radiation Therapy Patient Education    Person involved with teaching: Patient and sister Isela    Patient educational needs for self management of treatment-related side effects assessment completed.  EPIC Patient Ed tab contains Patient Learning Assessment    Education Materials Given  Radiation Therapy for GYN Patients    Educational Topics Discussed  Side effects expected, Skin care and When to call MD/RN    Response To Teaching  More review necessary-limited time for teaching due to need to get to OR for procedure, will review at treatment visits  GYN Only  Vaginal Dilator-given and educated: not given    Referrals sent: None    Chemotherapy?  No

## 2017-07-21 NOTE — PROGRESS NOTES
Discharge instructions reviewed with patient and sister.  All questions answered. All belongings returned to patient. Wheelchair transportation to radiation oncology for previously scheduled appointment.  Then to d/c home

## 2017-07-21 NOTE — IP AVS SNAPSHOT
Same Day Surgery [address] [address]    Phone:  [phone]                                       After Visit Summary   7/21/2017    Mary Anglin    MRN: [id number]           After Visit Summary Signature Page     I have received my discharge instructions, and my questions have been answered. I have discussed any challenges I see with this plan with the nurse or doctor.    ..........................................................................................................................................  Patient/Patient Representative Signature      ..........................................................................................................................................  Patient Representative Print Name and Relationship to Patient    ..................................................               ................................................  Date                                            Time    ..........................................................................................................................................  Reviewed by Signature/Title    ...................................................              ..............................................  Date                                                            Time

## 2017-07-21 NOTE — ANESTHESIA CARE TRANSFER NOTE
Patient: Mary Anglin    Procedure(s):  Pelvic Exam, Insertion Radiation Gold Seed Markers - Wound Class: II-Clean Contaminated    Diagnosis: Endometrial Cancer   Diagnosis Additional Information: No value filed.    Anesthesia Type:   MAC     Note:  Airway :Room Air  Patient transferred to:Phase II  Comments: Patient sedated as noted for procedure. Spontaneous respirations throughout anesthetic with respiratory status back to baseline on room air. Transported to Phase 2 recovery. Report off to RN.      Vitals: (Last set prior to Anesthesia Care Transfer)    CRNA VITALS  7/21/2017 1336 - 7/21/2017 1420      7/21/2017             Pulse: 73    SpO2: 100 %                Electronically Signed By: ES Arnold CRNA  July 21, 2017  2:20 PM

## 2017-07-21 NOTE — MR AVS SNAPSHOT
After Visit Summary   2017    Mary Anglin    MRN: [id number]           Patient Information     Date Of Birth          1970        Visit Information        Provider Department      2017 10:00 AM Jarrod Kessler MD Radiation Oncology Clinic        Today's Diagnoses     Endometrial cancer (H)    -  1       Follow-ups after your visit        Who to contact     Please call your clinic at [phone] to:    Ask questions about your health    Make or cancel appointments    Discuss your medicines    Learn about your test results    Speak to your doctor   If you have compliments or concerns about an experience at your clinic, or if you wish to file a complaint, please contact [facility] Patient Relations at [phone] or email us at [email or web address]         Additional Information About Your Visit        MyChart Information     Snocap is an electronic gateway that provides easy, online access to your medical records. With Snocap, you can request a clinic appointment, read your test results, renew a prescription or communicate with your care team.     To sign up for Snocap visit the website at [email or web address]   You will be asked to enter the access code listed below, as well as some personal information. Please follow the directions to create your username and password.     Your access code is: [email or web address]  Expires: 2017 10:37 AM     Your access code will  in 90 days. If you need help or a new code, please contact your [facility] or call [phone] for assistance.        Care EveryWhere ID     This is your Care EveryWhere ID. This could be used by other organizations to access your [place] medical records  [id number]         Blood Pressure from Last 3 Encounters:   17 150/88   17 138/81   17 122/70    Weight from Last 3 Encounters:   17 131.2 kg (289 lb 3.9 oz)    07/20/17 130.7 kg (288 lb 3.2 oz)   07/19/17 130.6 kg (288 lb)              Today, you had the following     No orders found for display       Primary Care Provider Office Phone # Fax #    Mikel Wells [phone]       [facility] [address] [address]        Equal Access to Services     [facility] HospitalLILIA : Hadii aad ku hadasho Soomaali, waaxda luqadaha, qaybta kaalmada adeegyada, waxay idiin hayaan adeeg kharash la'aan . So [facility] [phone].    ATENCIÓN: Si peterla yamilet, tiene a do disposición servicios gratuitos de asistencia lingüística. Gregor al [phone].    We comply with applicable federal civil rights laws and [place] laws. We do not discriminate on the basis of race, color, national origin, age, disability sex, sexual orientation or gender identity.            Thank you!     Thank you for choosing [facility]  for your care. Our goal is always to provide you with excellent care. Hearing back from our patients is one way we can continue to improve our services. Please take a few minutes to complete the written survey that you may receive in the mail after your visit with us. Thank you!             Your Updated Medication List - Protect others around you: Learn how to safely use, store and throw away your medicines at www.disposemymeds.org.          This list is accurate as of: 7/21/17 11:23 AM.  Always use your most recent med list.                   Brand Name Dispense Instructions for use Diagnosis    ACCU-CHEK BRADLEY PLUS test strip   Generic drug:  blood glucose monitoring      1 time per day        albuterol 108 (90 BASE) MCG/ACT Inhaler    PROAIR HFA/PROVENTIL HFA/VENTOLIN HFA     Inhale 2 puffs into the lungs every 4 hours as needed        atorvastatin 10 MG tablet    LIPITOR     Take 0.5 tablets (5 mg total) by mouth every night at bedtime.        blood glucose monitoring meter device kit      1 Device        clobetasol 0.05 %  cream    TEMOVATE     apply topically PRN        clotrimazole 1 % cream    LOTRIMIN     Apply to affected area 2 times a day PRN        docusate sodium 100 MG capsule    COLACE     Take 100 mg by mouth 2 times daily        doxepin 10 MG capsule    SINEquan     Take 20 mg by mouth At Bedtime        escitalopram 20 MG tablet    LEXAPRO     Take 20 mg by mouth At Bedtime        furosemide 40 MG tablet    LASIX     Take 40 mg by mouth 2 times daily        Lancet Device Misc      As Directed        Melatonin 5 MG Tbdp      Take 2.5-5 mg by mouth nightly as needed        metFORMIN 500 MG tablet    GLUCOPHAGE     Take 500 mg by mouth daily (with dinner)        mupirocin 2 % ointment    BACTROBAN     Apply to affected area 2 times a day as needed for other (Specify) (rash on back of neck)

## 2017-07-21 NOTE — ANESTHESIA POSTPROCEDURE EVALUATION
Patient: Mary Anglin    Procedure(s):  Pelvic Exam, Insertion Radiation Gold Seed Markers - Wound Class: II-Clean Contaminated    Diagnosis:Endometrial Cancer   Diagnosis Additional Information: No value filed.    Anesthesia Type:  MAC    Note:  Anesthesia Post Evaluation    Patient location during evaluation: PACU  Patient participation: Able to fully participate in evaluation  Level of consciousness: awake and alert  Pain management: adequate  Airway patency: patent  Cardiovascular status: acceptable  Respiratory status: acceptable  Hydration status: acceptable  PONV: none     Anesthetic complications: None          Last vitals:  Vitals:    07/21/17 1136 07/21/17 1415   BP: 125/85 138/81   Resp: 16 20   Temp: 36.5  C (97.7  F) 36.4  C (97.5  F)   SpO2: 96% 100%         Electronically Signed By: Duane F. Szczepanski, MD  July 21, 2017  2:27 PM

## 2017-07-31 ENCOUNTER — APPOINTMENT (OUTPATIENT)
Dept: RADIATION ONCOLOGY | Facility: CLINIC | Age: 47
End: 2017-07-31
Attending: RADIOLOGY
Payer: MEDICAID

## 2017-07-31 PROCEDURE — 77386 ZZH IMRT TREATMENT DELIVERY, COMPLEX: CPT | Performed by: RADIOLOGY

## 2017-08-01 ENCOUNTER — OFFICE VISIT (OUTPATIENT)
Dept: RADIATION ONCOLOGY | Facility: CLINIC | Age: 47
End: 2017-08-01
Attending: RADIOLOGY
Payer: MEDICAID

## 2017-08-01 VITALS — BODY MASS INDEX: 49.3 KG/M2 | WEIGHT: 287.2 LBS

## 2017-08-01 DIAGNOSIS — C54.1 ENDOMETRIAL CANCER (H): Primary | ICD-10-CM

## 2017-08-01 PROCEDURE — 77386 ZZH IMRT TREATMENT DELIVERY, COMPLEX: CPT | Performed by: RADIOLOGY

## 2017-08-01 NOTE — MR AVS SNAPSHOT
After Visit Summary   8/1/2017    Mary Anglin    MRN: [id number]           Patient Information     Date Of Birth          1970        Visit Information        Provider Department      8/1/2017 2:00 PM Hailey Ambriz MD Radiation Oncology Clinic        Today's Diagnoses     Endometrial cancer (H)    -  1       Follow-ups after your visit        Your next 10 appointments already scheduled     Aug 02, 2017  2:30 PM CDT   EXTERNAL RADIATION TREATMENT with [facility] RAD ONC RODRI   Radiation Oncology Clinic ([facility])    [facility]  1st Floor  [address] [facility] [address]   [phone]            Aug 03, 2017  2:30 PM CDT   EXTERNAL RADIATION TREATMENT with [facility] RAD ONC RODRI   Radiation Oncology Clinic ([facility])    [facility]  1st Floor  [address] [facility] [address]   [phone]            Aug 04, 2017  2:30 PM CDT   EXTERNAL RADIATION TREATMENT with [facility] RAD ONC RODRI   Radiation Oncology Clinic ([facility])    [facility]  1st Floor  [address] [facility] [address]   [phone]            Aug 07, 2017 12:00 PM CDT   EXTERNAL RADIATION TREATMENT with [facility] RAD ONC RODRI   Radiation Oncology Clinic ([facility])    [facility]  1st Floor  [address] [facility] [address]   [phone]            Aug 08, 2017  2:30 PM CDT   EXTERNAL RADIATION TREATMENT with [facility] RAD ONC RODRI   Radiation Oncology Clinic ([facility])    [facility]  1st Floor  [address] [facility] [address]   [phone]            Aug 08, 2017  3:00 PM CDT   ON TREATMENT VISIT with Hailey Ambriz MD   Radiation Oncology Clinic ([facility])    [facility]  1st Floor  [address] [facility] [address]   [phone]            Aug 09, 2017  2:30  PM CDT   EXTERNAL RADIATION TREATMENT with [facility] RAD ONC [facility]ODRI   Radiation Oncology Clinic ([facility])    [facility]  1st Floor  [address] [facility] [address]   [phone]            Aug 10, 2017  2:30 PM CDT   EXTERNAL RADIATION TREATMENT with [facility] RAD ONC [facility]OD[facility]   [facility] Oncology Clinic ([facility])    [facility]  1st Floor  [address] [facility] [address]   [phone]            Aug 11, 2017  2:30 PM CDT   EXTERNAL RADIATION TREATMENT with [facility] RAD ONC RODRI   Radiation Oncology Clinic ([facility])    [facility]  1st Floor  [address] [facility] [address]   [phone]            Aug 14, 2017 12:00 PM CDT   EXTERNAL RADIATION TREATMENT with [facility] RAD ONC RODRI   Radiation Oncology Clinic ([facility])    [facility]  1st Floor  [address] [facility] [address]   [phone]              Who to contact     Please call your clinic at [phone] to:    Ask questions about your health    Make or cancel appointments    Discuss your medicines    Learn about your test results    Speak to your doctor   If you have compliments or concerns about an experience at your clinic, or if you wish to file a complaint, please contact [facility] Patient Relations at [phone] or email us at [email or web address]         Additional Information About Your Visit        [employer] Information     Apliiq is an electronic gateway that provides easy, online access to your medical records. With Apliiq, you can request a clinic appointment, read your test results, renew a prescription or communicate with your care team.     To sign up for ClassOwlt visit the website at [email or web address]   You will be asked to enter the access code listed below, as well as some personal information. Please follow the directions  to create your username and password.     Your access code is: [email or web address]  Expires: 2017 10:37 AM     Your access code will  in 90 days. If you need help or a new code, please contact your [facility] or call [phone] for assistance.        Care EveryWhere ID     This is your Care EveryWhere ID. This could be used by other organizations to access your [place] medical records  [id number]        Your Vitals Were     BMI (Body Mass Index)                   49.3 kg/m2            Blood Pressure from Last 3 Encounters:   17 150/88   17 138/81   17 122/70    Weight from Last 3 Encounters:   17 130.3 kg (287 lb 3.2 oz)   17 131.2 kg (289 lb 3.9 oz)   17 130.7 kg (288 lb 3.2 oz)              Today, you had the following     No orders found for display       Primary Care Provider Office Phone # Fax #    Mikel Wells [phone]       [place] [address]        Equal Access to Services     ALEX ROSAS : Hadii peggy ku tayloro Sourban, waaxda luqadaha, qaybta kaalmada adedorianyada, loraine harris. So [facility] [phone].    ATENCIÓN: Si habla español, tiene a do disposición servicios gratuitos de asistencia lingüística. [facility] [phone].    We comply with applicable federal civil rights laws and [place] laws. We do not discriminate on the basis of race, color, national origin, age, disability sex, sexual orientation or gender identity.            Thank you!     Thank you for choosing [facility]  for your care. Our goal is always to provide you with excellent care. Hearing back from our patients is one way we can continue to improve our services. Please take a few minutes to complete the written survey that you may receive in the mail after your visit with us. Thank you!             Your Updated Medication List - Protect others around  you: Learn how to safely use, store and throw away your medicines at www.disposemymeds.org.          This list is accurate as of: 8/1/17 11:59 PM.  Always use your most recent med list.                   Brand Name Dispense Instructions for use Diagnosis    ACCU-CHEK BRADLEY PLUS test strip   Generic drug:  blood glucose monitoring      1 time per day        albuterol 108 (90 BASE) MCG/ACT Inhaler    PROAIR HFA/PROVENTIL HFA/VENTOLIN HFA     Inhale 2 puffs into the lungs every 4 hours as needed        atorvastatin 10 MG tablet    LIPITOR     Take 0.5 tablets (5 mg total) by mouth every night at bedtime.        blood glucose monitoring meter device kit      1 Device        clobetasol 0.05 % cream    TEMOVATE     apply topically PRN        clotrimazole 1 % cream    LOTRIMIN     Apply to affected area 2 times a day PRN        docusate sodium 100 MG capsule    COLACE     Take 100 mg by mouth 2 times daily        doxepin 10 MG capsule    SINEquan     Take 20 mg by mouth At Bedtime        DOXYCYCLINE CALCIUM PO      Does not know dose, for 10 days total        escitalopram 20 MG tablet    LEXAPRO     Take 20 mg by mouth At Bedtime        furosemide 40 MG tablet    LASIX     Take 40 mg by mouth 2 times daily        Lancet Device Misc      As Directed        Melatonin 5 MG Tbdp      Take 2.5-5 mg by mouth nightly as needed        metFORMIN 500 MG tablet    GLUCOPHAGE     Take 500 mg by mouth daily (with dinner)        mupirocin 2 % ointment    BACTROBAN     Apply to affected area 2 times a day as needed for other (Specify) (rash on back of neck)

## 2017-08-01 NOTE — PROGRESS NOTES
[facility] PH[facility]IANS  SPECIALIZING IN BREAKTHROUGHS  Radiation Oncology    On Treatment Visit Note      Mary Anglin      Date: 2017   MRN: [id number]   : 1970  Diagnosis: recurrent endometrial cancer      Reason for Visit:  On Radiation Treatment Visit     Treatment Summary to Date  Treatment Site: pelvis Current Dose: 360/4500+ cGy Fractions: 2/25fx       Chemotherapy  Chemo concurrent with radx?: No    Subjective:   No acute side effects at this time.     Nursing ROS:      Skin  Skin Reaction: 0 - No changes           Gastrointestinal  Nausea: 0 - None               Objective:   Wt 130.3 kg (287 lb 3.2 oz)  BMI 49.3 kg/m2  Gen: Appears well, in no acute distress      Labs:  CBC RESULTS:   Recent Labs   Lab Test  17   1214   WBC  6.5   RBC  4.35   HGB  13.7   HCT  41.9   MCV  96   MCH  31.5   MCHC  32.7   RDW  12.9   PLT  133*     ELECTROLYTES:  Recent Labs   Lab Test  17   1214   NA  139   POTASSIUM  4.0   CHLORIDE  108   VIRI  8.9   CO2  26   BUN  12   CR  0.79   GLC  130*       Assessment:    Tolerating radiation therapy well.  All questions and concerns addressed.    Toxicities:  No acute toxicities     Plan:   1. Continue current therapy.        Mosaiq chart and setup information reviewed  MVCT/IGRT images checked      Lazaro Nichols MD    I saw the patient with the resident.  I agree with the resident note and plan of care.      Hailey Ambriz MD.

## 2017-08-01 NOTE — LETTER
2017       RE: Mary Anglin  [address]   [facility] [address]     Dear Colleague,    Thank you for referring your patient, Mary Anglin, to the RADIATION ONCOLOGY CLINIC. Please see a copy of my visit note below.    [facility] PHYSICIANS  SPECIALIZING IN BREAKTHROUGHS  Radiation Oncology    On Treatment Visit Note      Mary Anglin      Date: 2017   MRN: [id number]   : 1970  Diagnosis: recurrent endometrial cancer      Reason for Visit:  On Radiation Treatment Visit     Treatment Summary to Date  Treatment Site: pelvis Current Dose: 360/4500+ cGy Fractions: 2/25fx       Chemotherapy  Chemo concurrent with radx?: No    Subjective:   No acute side effects at this time.     Nursing ROS:      Skin  Skin Reaction: 0 - No changes           Gastrointestinal  Nausea: 0 - None               Objective:   Wt 130.3 kg (287 lb 3.2 oz)  BMI 49.3 kg/m2  Gen: Appears well, in no acute distress      Labs:  CBC RESULTS:   Recent Labs   Lab Test  17   1214   WBC  6.5   RBC  4.35   HGB  13.7   HCT  41.9   MCV  96   MCH  31.5   MCHC  32.7   RDW  12.9   PLT  133*     ELECTROLYTES:  Recent Labs   Lab Test  17   1214   NA  139   POTASSIUM  4.0   CHLORIDE  108   VIRI  8.9   CO2  26   BUN  12   CR  0.79   GLC  130*       Assessment:    Tolerating radiation therapy well.  All questions and concerns addressed.    Toxicities:  No acute toxicities     Plan:   1. Continue current therapy.        Mosaiq chart and setup information reviewed  MVCT/IGRT images checked      Lazaro Nichols MD    I saw the patient with the resident.  I agree with the resident note and plan of care.      Hailey Ambriz MD.

## 2017-08-02 ENCOUNTER — APPOINTMENT (OUTPATIENT)
Dept: RADIATION ONCOLOGY | Facility: CLINIC | Age: 47
End: 2017-08-02
Attending: RADIOLOGY
Payer: MEDICAID

## 2017-08-02 PROCEDURE — 77386 ZZH IMRT TREATMENT DELIVERY, COMPLEX: CPT | Performed by: RADIOLOGY

## 2017-08-03 ENCOUNTER — APPOINTMENT (OUTPATIENT)
Dept: RADIATION ONCOLOGY | Facility: CLINIC | Age: 47
End: 2017-08-03
Attending: RADIOLOGY
Payer: MEDICAID

## 2017-08-03 DIAGNOSIS — C54.1 ENDOMETRIAL CANCER (H): Primary | ICD-10-CM

## 2017-08-03 PROCEDURE — 77386 ZZH IMRT TREATMENT DELIVERY, COMPLEX: CPT | Performed by: RADIOLOGY

## 2017-08-03 RX ORDER — PHENAZOPYRIDINE HYDROCHLORIDE 200 MG/1
200 TABLET, FILM COATED ORAL ONCE
Status: CANCELLED | OUTPATIENT
Start: 2017-08-03 | End: 2017-08-03

## 2017-08-04 ENCOUNTER — APPOINTMENT (OUTPATIENT)
Dept: RADIATION ONCOLOGY | Facility: CLINIC | Age: 47
End: 2017-08-04
Attending: RADIOLOGY
Payer: MEDICAID

## 2017-08-04 PROCEDURE — 77336 RADIATION PHYSICS CONSULT: CPT | Performed by: RADIOLOGY

## 2017-08-04 PROCEDURE — 77386 ZZH IMRT TREATMENT DELIVERY, COMPLEX: CPT | Performed by: RADIOLOGY

## 2017-08-07 ENCOUNTER — APPOINTMENT (OUTPATIENT)
Dept: RADIATION ONCOLOGY | Facility: CLINIC | Age: 47
End: 2017-08-07
Attending: RADIOLOGY
Payer: MEDICAID

## 2017-08-07 PROCEDURE — 77386 ZZH IMRT TREATMENT DELIVERY, COMPLEX: CPT | Performed by: RADIOLOGY

## 2017-08-08 ENCOUNTER — HOSPITAL ENCOUNTER (INPATIENT)
Facility: CLINIC | Age: 47
Setting detail: SURGERY ADMIT
End: 2017-08-08
Attending: RADIOLOGY | Admitting: RADIOLOGY
Payer: MEDICAID

## 2017-08-08 ENCOUNTER — APPOINTMENT (OUTPATIENT)
Dept: RADIATION ONCOLOGY | Facility: CLINIC | Age: 47
End: 2017-08-08
Attending: RADIOLOGY
Payer: MEDICAID

## 2017-08-08 VITALS — BODY MASS INDEX: 49.01 KG/M2 | WEIGHT: 285.5 LBS

## 2017-08-08 DIAGNOSIS — C54.1 ENDOMETRIAL CANCER (H): ICD-10-CM

## 2017-08-08 DIAGNOSIS — B36.9 FUNGAL INFECTION OF SKIN: Primary | ICD-10-CM

## 2017-08-08 PROCEDURE — 77386 ZZH IMRT TREATMENT DELIVERY, COMPLEX: CPT | Performed by: RADIOLOGY

## 2017-08-08 RX ORDER — CLOTRIMAZOLE 1 %
CREAM (GRAM) TOPICAL 2 TIMES DAILY
Qty: 40 G | Refills: 1 | Status: SHIPPED | OUTPATIENT
Start: 2017-08-08

## 2017-08-08 NOTE — MR AVS SNAPSHOT
After Visit Summary   8/8/2017    Mary Anglin    MRN: [id number]           Patient Information     Date Of Birth          1970        Visit Information        Provider Department      8/8/2017 9:30 AM Hailey Ambriz MD Radiation Oncology Clinic        Today's Diagnoses     Fungal infection of skin    -  1    Endometrial cancer (H)           Follow-ups after your visit        Your next 10 appointments already scheduled     Aug 09, 2017  9:15 AM CDT   EXTERNAL RADIATION TREATMENT with [facility] RAD ONC RODRI   Radiation Oncology Clinic ([facility])    [facility]  1st Floor  [address] [facility] [address]   [phone]            Aug 10, 2017  2:30 PM CDT   EXTERNAL RADIATION TREATMENT with [facility] RAD ONC RODRI   Radiation Oncology Clinic ([facility])    [facility]  1st Floor  [address] [address]   [phone]            Aug 11, 2017  2:30 PM CDT   EXTERNAL RADIATION TREATMENT with [facility] RAD ONC RODRI   Radiation Oncology Clinic ([facility])    [facility]  1st Floor  [address] [facility] [address]   [phone]            Aug 14, 2017  8:00 AM CDT   EXTERNAL RADIATION TREATMENT with [facility] RAD ONC RODRI   Radiation Oncology Clinic ([facility])    [facility]  1st Floor  [address] [facility] [address]   [phone]            Aug 15, 2017  8:30 AM CDT   EXTERNAL RADIATION TREATMENT with [facility] RAD ONC RODRI   Radiation Oncology Clinic ([facility])    [facility]  1st Floor  [address] [facility] [address]   [phone]            Aug 15, 2017  8:45 AM CDT   ON TREATMENT VISIT with Hailey Ambriz MD   Radiation Oncology Clinic ([facility])    [facility]  1st Floor  [address] [facility] [address]   [phone]             Aug 16, 2017  8:30 AM CDT   EXTERNAL RADIATION TREATMENT with [facility] RAD ONC [facility]   Radiation Oncology Clinic ([facility])    [facility]  1st Floor  [address] [facility] [address]   [phone]            Aug 17, 2017  8:30 AM CDT   EXTERNAL RADIATION TREATMENT with [facility] RAD ONC [facility]   Radiation Oncology Clinic ([facility])    [facility]  1st Floor  [address] [facility] [address]   [phone]            Aug 18, 2017  8:30 AM CDT   EXTERNAL RADIATION TREATMENT with [facility] RAD ONC [facility]RI   Radiation Oncology Clinic ([facility])    [facility]  1st Floor  [address] [facility] [address]   [phone]            Aug 21, 2017  8:00 AM CDT   EXTERNAL RADIATION TREATMENT with [facility] RAD ONC RODRI   Radiation Oncology Clinic ([facility])    [facility]  1st Floor  [address] [facility] [address] [phone]              Who to contact     Please call your clinic at [phone] to:    Ask questions about your health    Make or cancel appointments    Discuss your medicines    Learn about your test results    Speak to your doctor   If you have compliments or concerns about an experience at your clinic, or if you wish to file a complaint, please contact [facility] Patient Relations at [phone] or email us at [email or web address]         Additional Information About Your Visit        IntroBridgehart Information     [employer]nexon is an electronic gateway that provides easy, online access to your medical records. With Anexon, you can request a clinic appointment, read your test results, renew a prescription or communicate with your care team.     To sign up for [employer] visit the website at [email or web address]   You will be asked to enter the access code listed below, as well as some personal  information. Please follow the directions to create your username and password.     Your access code is: [email or web address]  Expires: 2017 10:37 AM     Your access code will  in 90 days. If you need help or a new code, please contact your [facility] or call [phone] for assistance.        Care EveryWhere ID     This is your Care EveryWhere ID. This could be used by other organizations to access your [place] medical records  [id number]        Your Vitals Were     BMI (Body Mass Index)                   49.01 kg/m2            Blood Pressure from Last 3 Encounters:   17 150/88   17 138/81   17 122/70    Weight from Last 3 Encounters:   17 129.5 kg (285 lb 8 oz)   17 130.3 kg (287 lb 3.2 oz)   17 131.2 kg (289 lb 3.9 oz)              Today, you had the following     No orders found for display         Today's Medication Changes          These changes are accurate as of: 17  2:19 PM.  If you have any questions, ask your nurse or doctor.               These medicines have changed or have updated prescriptions.        Dose/Directions    * clotrimazole 1 % cream   Commonly known as:  LOTRIMIN   This may have changed:  Another medication with the same name was added. Make sure you understand how and when to take each.   Changed by:  Abiodun Gore MD        Apply to affected area 2 times a day PRN   Refills:  0       * clotrimazole 1 % cream   Commonly known as:  LOTRIMIN   This may have changed:  You were already taking a medication with the same name, and this prescription was added. Make sure you understand how and when to take each.   Used for:  Fungal infection of skin, Endometrial cancer (H)   Changed by:  Hailey Ambriz MD        Apply topically 2 times daily   Quantity:  40 g   Refills:  1       * Notice:  This list has 2 medication(s) that are the same as other medications prescribed for you. Read the directions carefully,  and ask your doctor or other care provider to review them with you.         Where to get your medicines      These medications were sent to [facility] - [place], [place] - [address]  [address], [facility] [address]     Phone:  [phone]     clotrimazole 1 % cream                Primary Care Provider Office Phone # Fax #    Mikel Wells [phone]       [facility] [address] [facility] [address]        Equal Access to Services     ALEX ROSAS : Hadii aad ku hadasho Soomaali, waaxda luqadaha, qaybta kaalmada adeegyada, waxay idiin hayaan adeeg kishaaracarlos turner . So [facility] [phone].    ATENCIÓN: Si habla español, tiene a do disposición servicios gratuitos de asistencia lingüística. Gregor al [phone].    We comply with applicable federal civil rights laws and [place] laws. We do not discriminate on the basis of race, color, national origin, age, disability sex, sexual orientation or gender identity.            Thank you!     Thank you for choosing [facility]  for your care. Our goal is always to provide you with excellent care. Hearing back from our patients is one way we can continue to improve our services. Please take a few minutes to complete the written survey that you may receive in the mail after your visit with us. Thank you!             Your Updated Medication List - Protect others around you: Learn how to safely use, store and throw away your medicines at www.disposemymeds.org.          This list is accurate as of: 8/8/17  2:19 PM.  Always use your most recent med list.                   Brand Name Dispense Instructions for use Diagnosis    ACCU-CHEK BRADLEY PLUS test strip   Generic drug:  blood glucose monitoring      1 time per day        albuterol 108 (90 BASE) MCG/ACT Inhaler    PROAIR HFA/PROVENTIL HFA/VENTOLIN HFA     Inhale 2 puffs into the lungs every 4 hours as needed        atorvastatin 10 MG  tablet    LIPITOR     Take 0.5 tablets (5 mg total) by mouth every night at bedtime.        blood glucose monitoring meter device kit      1 Device        clobetasol 0.05 % cream    TEMOVATE     apply topically PRN        * clotrimazole 1 % cream    LOTRIMIN     Apply to affected area 2 times a day PRN        * clotrimazole 1 % cream    LOTRIMIN    40 g    Apply topically 2 times daily    Fungal infection of skin, Endometrial cancer (H)       docusate sodium 100 MG capsule    COLACE     Take 100 mg by mouth 2 times daily        doxepin 10 MG capsule    SINEquan     Take 20 mg by mouth At Bedtime        DOXYCYCLINE CALCIUM PO      Does not know dose, for 10 days total        escitalopram 20 MG tablet    LEXAPRO     Take 20 mg by mouth At Bedtime        furosemide 40 MG tablet    LASIX     Take 40 mg by mouth 2 times daily        Lancet Device Misc      As Directed        Melatonin 5 MG Tbdp      Take 2.5-5 mg by mouth nightly as needed        metFORMIN 500 MG tablet    GLUCOPHAGE     Take 500 mg by mouth daily (with dinner)        mupirocin 2 % ointment    BACTROBAN     Apply to affected area 2 times a day as needed for other (Specify) (rash on back of neck)        * Notice:  This list has 2 medication(s) that are the same as other medications prescribed for you. Read the directions carefully, and ask your doctor or other care provider to review them with you.

## 2017-08-08 NOTE — LETTER
2017     RE: Mary Anglin  [address]   [facility] [facility] [place] [facility] [address]     Dear Colleague,    Thank you for referring your patient, Mary Anglin, to the RADIATION ONCOLOGY CLINIC. Please see a copy of my visit note below.    [facility]  SPECIALIZING IN BREAKTHROUGHS  Radiation Oncology    On Treatment Visit Note      Mary Anglin      Date: 2017   MRN: [id number]   : 1970  Diagnosis: recurrent endometrial cancer      Reason for Visit:  On Radiation Treatment Visit     Treatment Summary to Date  Treatment Site: pelvis Current Dose: 1260/4500+ cGy Fractions: fx       Chemotherapy  Chemo concurrent with radx?: No    Subjective:   Patient has been taking antibiotics for skin infection on face. Infection has improved, but she has developed loose stools and abdominal cramping. She has stopped taking the antibiotics. She also has developed fungal infection within inframammary folds and bilateral groin. She has been taking clotrimazole for this. No additional issues at this time.     Nursing ROS:      Skin  Skin Reaction: 0 - No changes           Gastrointestinal  Nausea: 0 - None               Objective:   Wt 129.5 kg (285 lb 8 oz)  BMI 49.01 kg/m2  Gen: Appears well, in no acute distress  Skin: Erythema within inframammary folds, likely consistent with fungal skin infection     Labs:  CBC RESULTS:   Recent Labs   Lab Test  17   1214   WBC  6.5   RBC  4.35   HGB  13.7   HCT  41.9   MCV  96   MCH  31.5   MCHC  32.7   RDW  12.9   PLT  133*     ELECTROLYTES:  Recent Labs   Lab Test  17   1214   NA  139   POTASSIUM  4.0   CHLORIDE  108   VIRI  8.9   CO2  26   BUN  12   CR  0.79   GLC  130*       Assessment:    Tolerating radiation therapy well.  All questions and concerns addressed.    Toxicities:  Diarrhea: Grade 1: Increase of <4 stools per day over baseline; mild increase in ostomy output compared to baseline    Plan:   1. Continue current therapy.     2. Imodium and probiotics for diarrhea  3. Prescribe refill of clotrimazole for fungal infection of skin      Mosaiq chart and setup information reviewed  MVCT/IGRT images checked    Lazaro Nichols MD    I saw the patient with the resident.  I agree with the resident note and plan of care.      Hailey Ambriz MD   [phone]

## 2017-08-08 NOTE — PROGRESS NOTES
[facility] PHYSICIANS  SPECIALIZING IN BREAKTHROUGHS  Radiation Oncology    On Treatment Visit Note      Mary Anglin      Date: 2017   MRN: [id number]   : 1970  Diagnosis: recurrent endometrial cancer      Reason for Visit:  On Radiation Treatment Visit     Treatment Summary to Date  Treatment Site: pelvis Current Dose: 1260/4500+ cGy Fractions: fx       Chemotherapy  Chemo concurrent with radx?: No    Subjective:   Patient has been taking antibiotics for skin infection on face. Infection has improved, but she has developed loose stools and abdominal cramping. She has stopped taking the antibiotics. She also has developed fungal infection within inframammary folds and bilateral groin. She has been taking clotrimazole for this. No additional issues at this time.     Nursing ROS:      Skin  Skin Reaction: 0 - No changes           Gastrointestinal  Nausea: 0 - None               Objective:   Wt 129.5 kg (285 lb 8 oz)  BMI 49.01 kg/m2  Gen: Appears well, in no acute distress  Skin: Erythema within inframammary folds, likely consistent with fungal skin infection     Labs:  CBC RESULTS:   Recent Labs   Lab Test  17   1214   WBC  6.5   RBC  4.35   HGB  13.7   HCT  41.9   MCV  96   MCH  31.5   MCHC  32.7   RDW  12.9   PLT  133*     ELECTROLYTES:  Recent Labs   Lab Test  17   1214   NA  139   POTASSIUM  4.0   CHLORIDE  108   VIRI  8.9   CO2  26   BUN  12   CR  0.79   GLC  130*       Assessment:    Tolerating radiation therapy well.  All questions and concerns addressed.    Toxicities:  Diarrhea: Grade 1: Increase of <4 stools per day over baseline; mild increase in ostomy output compared to baseline    Plan:   1. Continue current therapy.    2. Imodium and probiotics for diarrhea  3. Prescribe refill of clotrimazole for fungal infection of skin      Mosaiq chart and setup information reviewed  MVCT/IGRT images checked    Lazaro Nichols MD    I saw the patient with the resident.   I agree with the resident note and plan of care.      Hailey Ambriz MD   [phone]

## 2017-08-09 ENCOUNTER — APPOINTMENT (OUTPATIENT)
Dept: RADIATION ONCOLOGY | Facility: CLINIC | Age: 47
End: 2017-08-09
Attending: RADIOLOGY
Payer: MEDICAID

## 2017-08-09 PROCEDURE — 77386 ZZH IMRT TREATMENT DELIVERY, COMPLEX: CPT | Performed by: RADIOLOGY

## 2017-08-10 ENCOUNTER — APPOINTMENT (OUTPATIENT)
Dept: RADIATION ONCOLOGY | Facility: CLINIC | Age: 47
End: 2017-08-10
Attending: RADIOLOGY
Payer: MEDICAID

## 2017-08-10 PROCEDURE — 77386 ZZH IMRT TREATMENT DELIVERY, COMPLEX: CPT | Performed by: RADIOLOGY

## 2017-08-11 ENCOUNTER — APPOINTMENT (OUTPATIENT)
Dept: RADIATION ONCOLOGY | Facility: CLINIC | Age: 47
End: 2017-08-11
Attending: RADIOLOGY
Payer: MEDICAID

## 2017-08-11 PROCEDURE — 77336 RADIATION PHYSICS CONSULT: CPT | Performed by: RADIOLOGY

## 2017-08-11 PROCEDURE — 77386 ZZH IMRT TREATMENT DELIVERY, COMPLEX: CPT | Performed by: RADIOLOGY

## 2017-08-14 ENCOUNTER — APPOINTMENT (OUTPATIENT)
Dept: RADIATION ONCOLOGY | Facility: CLINIC | Age: 47
End: 2017-08-14
Attending: RADIOLOGY
Payer: MEDICAID

## 2017-08-14 PROCEDURE — 77386 ZZH IMRT TREATMENT DELIVERY, COMPLEX: CPT | Performed by: RADIOLOGY

## 2017-08-15 ENCOUNTER — OFFICE VISIT (OUTPATIENT)
Dept: RADIATION ONCOLOGY | Facility: CLINIC | Age: 47
End: 2017-08-15
Attending: RADIOLOGY
Payer: MEDICAID

## 2017-08-15 VITALS — BODY MASS INDEX: 49.95 KG/M2 | WEIGHT: 291 LBS

## 2017-08-15 DIAGNOSIS — C54.1 ENDOMETRIAL CANCER (H): Primary | ICD-10-CM

## 2017-08-15 PROCEDURE — 77386 ZZH IMRT TREATMENT DELIVERY, COMPLEX: CPT | Performed by: RADIOLOGY

## 2017-08-15 NOTE — LETTER
"8/15/2017       RE: Mary Anglin  [address]   [facility] [address]     Dear Colleague,    Thank you for referring your patient, Mary Anglin, to the RADIATION ONCOLOGY CLINIC. Please see a copy of my visit note below.    Moderate diarrhea, 1 \"pill\" RUQ pain, urine normal. Small amount of blood on toilet paper.   [facility] PHYSICIANS  SPECIALIZING IN BREAKTHROUGHS  Radiation Oncology    On Treatment Visit Note      Mary Anglin      Date: 8/15/2017   MRN: [id number]   : 1970  Diagnosis: recurrent endometrial cancer      Reason for Visit:  On Radiation Treatment Visit     Treatment Summary to Date  Treatment Site: pelvis Current Dose: 2160/4500+ cGy Fractions: fx       Chemotherapy  Chemo concurrent with radx?: No    Subjective:   Patient having some increased loose stools. She took an anti-diarrheal pill that her sister gave her yesterday, she is unsure what it was, but believes that it was over the counter. She noted a small amount of blood on the toilet paper yesterday after a BM. She has had issues with rectal bleeding in the past related to hemorrhoids. She is up to date with her colonoscopy. She denies any urinary symptoms    . She reports some mild RUQ abdominal pain over the past few days. She is unsure whether this is related to her bowel movements.     Nursing ROS:      Skin  Skin Reaction: 0 - No changes  Gastrointestinal  Nausea: 0 - None   Pain Assessment  0-10 Pain Scale: 0    Objective:   Wt 132 kg (291 lb)  BMI 49.95 kg/m2  Gen: Appears well, in no acute distress  Abdominal exam remarkable only for the large panus.  No  Rebound or rigidity .  Pain is in RUQ    Labs:  No recent labs     Assessment:    Tolerating radiation therapy well.  All questions and concerns addressed. Recent RUQ abdominal pain.     Toxicities:  Diarrhea: Grade 1: Increase of <4 stools per day over baseline; mild increase in ostomy output compared to baseline    Plan: "   1. Continue current therapy.    2. Will continue to monitor RUQ abdominal pain. If this continues to worsen, may consider work up for cholecystitis.       Mosaiq chart and setup information reviewed  MVCT/IGRT images checked    Lazaro Nichols MD  I saw the patient with the resident.  I agree with the resident note and plan of care.      Hailey Ambriz MD

## 2017-08-15 NOTE — MR AVS SNAPSHOT
After Visit Summary   8/15/2017    Mary Anglin    MRN: [id number]           Patient Information     Date Of Birth          1970        Visit Information        Provider Department      8/15/2017 8:45 AM Hailey Ambriz MD Radiation Oncology Clinic        Today's Diagnoses     Endometrial cancer (H)    -  1       Follow-ups after your visit        Your next 10 appointments already scheduled     Aug 17, 2017  8:30 AM CDT   EXTERNAL RADIATION TREATMENT with [facility] RAD ONC RODRI   Radiation Oncology Clinic ([facility])    [facility]  1st Floor  [address] [address]   [phone]            Aug 18, 2017  8:30 AM CDT   EXTERNAL RADIATION TREATMENT with [facility] RAD ONC RODRI   Radiation Oncology Clinic ([facility])    [facility]  1st Floor  [address] [address]   [phone]            Aug 21, 2017  8:00 AM CDT   EXTERNAL RADIATION TREATMENT with [facility] RAD ONC RODRI   Radiation Oncology Clinic ([facility])    [facility]  1st Floor  [address] [address]   [phone]            Aug 21, 2017 11:00 AM CDT   (Arrive by 10:45 AM)   PAC EVALUATION with  Pac Angella 7   [facility] Preoperative Assessment Center ([facility])    [address] [place] [address] [phone]            Aug 21, 2017 12:00 PM CDT   (Arrive by 11:45 AM)   PAC RN ASSESSMENT with Uc Pac Rn   [facility] Preoperative Assessment Center ([facility])    [address]  [address] [facility] [address]   [phone]            Aug 21, 2017 12:50 PM CDT   (Arrive by 12:35 PM)   PAC Anesthesia Consult with Uc Pac Anesthesiologist   [facility] Preoperative Assessment Icard ([facility])    [address] [place] [address]  [address] [facility] [address] [phone]             Aug 22, 2017  8:30 AM CDT   EXTERNAL RADIATION TREATMENT with [facility] RAD ONC RODRI   Radiation Oncology Clinic ([facility])    [facility]  1st Floor  [address] [place] [address]   [phone]            Aug 22, 2017  8:45 AM CDT   ON TREATMENT VISIT with Jossy Regalado MD   Radiation Oncology Clinic ([facility])    [facility]  1st Floor  [address] [place] [address] [phone]            Aug 23, 2017  8:30 AM CDT   EXTERNAL RADIATION TREATMENT with [facility] RAD ONC RODRI   Radiation Oncology Clinic ([facility])    [facility]  1st Floor  [address] [place] [address] [phone]            Aug 24, 2017  8:30 AM CDT   EXTERNAL RADIATION TREATMENT with [facility] RAD ONC RODRI   Radiation Oncology Clinic ([facility])    [facility]  1st Floor  [address] [place] [address] [phone]              Who to contact     Please call your clinic at [phone] to:    Ask questions about your health    Make or cancel appointments    Discuss your medicines    Learn about your test results    Speak to your doctor   If you have compliments or concerns about an experience at your clinic, or if you wish to file a complaint, please contact [facility] Patient Relations at [phone] or email us at [email or web address]         Additional Information About Your Visit        Tytohart Information     [employer] is an electronic gateway that provides easy, online access to your medical records. With [employer], you can request a clinic appointment, read your test results, renew a prescription or communicate with your care team.     To sign up for [employer] (deja mi)t visit the website at [email or web address]   You will be asked to enter the access code listed below, as well as some personal information.  Please follow the directions to create your username and password.     Your access code is: [email or web address]  Expires: 2017 10:37 AM     Your access code will  in 90 days. If you need help or a new code, please contact your [facility] or call [phone] for assistance.        Care EveryWhere ID     This is your Care EveryWhere ID. This could be used by other organizations to access your [place] medical records  [id number]        Your Vitals Were     BMI (Body Mass Index)                   49.95 kg/m2            Blood Pressure from Last 3 Encounters:   17 150/88   17 138/81   17 122/70    Weight from Last 3 Encounters:   08/15/17 132 kg (291 lb)   17 129.5 kg (285 lb 8 oz)   17 130.3 kg (287 lb 3.2 oz)              Today, you had the following     No orders found for display       Primary Care Provider Office Phone # Fax #    Mikel Wells [phone]       [place] [address]        Equal Access to Services     HEATHER ROSAS : Hadii aad ku hadasho Socollinsali, waaxda luqadaha, qaybta kaalmada adeegyada, loraine harris. So [facility] [phone].    ATENCIÓN: Si habla español, tiene a do disposición servicios gratuitos de asistencia lingüística. Gageame al [phone].    We comply with applicable federal civil rights laws and [place] laws. We do not discriminate on the basis of race, color, national origin, age, disability sex, sexual orientation or gender identity.            Thank you!     Thank you for choosing RADIATION ONCOLOGY CLINIC  for your care. Our goal is always to provide you with excellent care. Hearing back from our patients is one way we can continue to improve our services. Please take a few minutes to complete the written survey that you may receive in the mail after your visit with us. Thank you!             Your Updated Medication List -  Protect others around you: Learn how to safely use, store and throw away your medicines at www.disposemymeds.org.          This list is accurate as of: 8/15/17 11:59 PM.  Always use your most recent med list.                   Brand Name Dispense Instructions for use Diagnosis    ACCU-CHEK BRADLEY PLUS test strip   Generic drug:  blood glucose monitoring      1 time per day        albuterol 108 (90 BASE) MCG/ACT Inhaler    PROAIR HFA/PROVENTIL HFA/VENTOLIN HFA     Inhale 2 puffs into the lungs every 4 hours as needed        atorvastatin 10 MG tablet    LIPITOR     Take 0.5 tablets (5 mg total) by mouth every night at bedtime.        blood glucose monitoring meter device kit      1 Device        clobetasol 0.05 % cream    TEMOVATE     apply topically PRN        * clotrimazole 1 % cream    LOTRIMIN     Apply to affected area 2 times a day PRN        * clotrimazole 1 % cream    LOTRIMIN    40 g    Apply topically 2 times daily    Fungal infection of skin, Endometrial cancer (H)       docusate sodium 100 MG capsule    COLACE     Take 100 mg by mouth 2 times daily        doxepin 10 MG capsule    SINEquan     Take 20 mg by mouth At Bedtime        DOXYCYCLINE CALCIUM PO      Does not know dose, for 10 days total        escitalopram 20 MG tablet    LEXAPRO     Take 20 mg by mouth At Bedtime        furosemide 40 MG tablet    LASIX     Take 40 mg by mouth 2 times daily        Lancet Device Misc      As Directed        Melatonin 5 MG Tbdp      Take 2.5-5 mg by mouth nightly as needed        metFORMIN 500 MG tablet    GLUCOPHAGE     Take 500 mg by mouth daily (with dinner)        mupirocin 2 % ointment    BACTROBAN     Apply to affected area 2 times a day as needed for other (Specify) (rash on back of neck)        * Notice:  This list has 2 medication(s) that are the same as other medications prescribed for you. Read the directions carefully, and ask your doctor or other care provider to review them with you.

## 2017-08-15 NOTE — PROGRESS NOTES
"Moderate diarrhea, 1 \"pill\" RUQ pain, urine normal. Small amount of blood on toilet paper.   [facility]  SPECIALIZING IN BREAKTHROUGHS  Radiation Oncology    On Treatment Visit Note      Mary Anglin      Date: 8/15/2017   MRN: [id number]   : 1970  Diagnosis: recurrent endometrial cancer      Reason for Visit:  On Radiation Treatment Visit     Treatment Summary to Date  Treatment Site: pelvis Current Dose: 2160/4500+ cGy Fractions: 12/25fx       Chemotherapy  Chemo concurrent with radx?: No    Subjective:   Patient having some increased loose stools. She took an anti-diarrheal pill that her sister gave her yesterday, she is unsure what it was, but believes that it was over the counter. She noted a small amount of blood on the toilet paper yesterday after a BM. She has had issues with rectal bleeding in the past related to hemorrhoids. She is up to date with her colonoscopy. She denies any urinary symptoms    . She reports some mild RUQ abdominal pain over the past few days. She is unsure whether this is related to her bowel movements.     Nursing ROS:      Skin  Skin Reaction: 0 - No changes  Gastrointestinal  Nausea: 0 - None   Pain Assessment  0-10 Pain Scale: 0    Objective:   Wt 132 kg (291 lb)  BMI 49.95 kg/m2  Gen: Appears well, in no acute distress  Abdominal exam remarkable only for the large panus.  No  Rebound or rigidity .  Pain is in RUQ    Labs:  No recent labs     Assessment:    Tolerating radiation therapy well.  All questions and concerns addressed. Recent RUQ abdominal pain.     Toxicities:  Diarrhea: Grade 1: Increase of <4 stools per day over baseline; mild increase in ostomy output compared to baseline    Plan:   1. Continue current therapy.    2. Will continue to monitor RUQ abdominal pain. If this continues to worsen, may consider work up for cholecystitis.       CritiSense chart and setup information reviewed  MVCT/IGRT images checked    Lazaro Nichols MD  I " saw the patient with the resident.  I agree with the resident note and plan of care.      Hailey Ambriz MD

## 2017-08-16 ENCOUNTER — APPOINTMENT (OUTPATIENT)
Dept: RADIATION ONCOLOGY | Facility: CLINIC | Age: 47
End: 2017-08-16
Attending: RADIOLOGY
Payer: MEDICAID

## 2017-08-16 PROCEDURE — 77386 ZZH IMRT TREATMENT DELIVERY, COMPLEX: CPT | Performed by: RADIOLOGY

## 2017-08-17 ENCOUNTER — APPOINTMENT (OUTPATIENT)
Dept: RADIATION ONCOLOGY | Facility: CLINIC | Age: 47
End: 2017-08-17
Attending: RADIOLOGY
Payer: MEDICAID

## 2017-08-17 PROCEDURE — 77386 ZZH IMRT TREATMENT DELIVERY, COMPLEX: CPT | Performed by: RADIOLOGY

## 2017-08-18 ENCOUNTER — APPOINTMENT (OUTPATIENT)
Dept: RADIATION ONCOLOGY | Facility: CLINIC | Age: 47
End: 2017-08-18
Attending: RADIOLOGY
Payer: MEDICAID

## 2017-08-18 PROCEDURE — 77386 ZZH IMRT TREATMENT DELIVERY, COMPLEX: CPT | Performed by: RADIOLOGY

## 2017-08-18 PROCEDURE — 77336 RADIATION PHYSICS CONSULT: CPT | Performed by: RADIOLOGY

## 2017-08-21 ENCOUNTER — ANESTHESIA EVENT (OUTPATIENT)
Dept: SURGERY | Facility: CLINIC | Age: 47
End: 2017-08-21

## 2017-08-21 ENCOUNTER — ALLIED HEALTH/NURSE VISIT (OUTPATIENT)
Dept: SURGERY | Facility: CLINIC | Age: 47
End: 2017-08-21

## 2017-08-21 ENCOUNTER — OFFICE VISIT (OUTPATIENT)
Dept: SURGERY | Facility: CLINIC | Age: 47
End: 2017-08-21

## 2017-08-21 ENCOUNTER — APPOINTMENT (OUTPATIENT)
Dept: RADIATION ONCOLOGY | Facility: CLINIC | Age: 47
End: 2017-08-21
Attending: RADIOLOGY
Payer: MEDICAID

## 2017-08-21 VITALS
DIASTOLIC BLOOD PRESSURE: 82 MMHG | TEMPERATURE: 98.3 F | RESPIRATION RATE: 16 BRPM | HEIGHT: 64 IN | SYSTOLIC BLOOD PRESSURE: 124 MMHG | BODY MASS INDEX: 49.1 KG/M2 | HEART RATE: 67 BPM | OXYGEN SATURATION: 94 % | WEIGHT: 287.6 LBS

## 2017-08-21 DIAGNOSIS — Z01.818 PREOP GENERAL PHYSICAL EXAM: Primary | ICD-10-CM

## 2017-08-21 DIAGNOSIS — Z01.818 PREOP GENERAL PHYSICAL EXAM: ICD-10-CM

## 2017-08-21 LAB
ANION GAP SERPL CALCULATED.3IONS-SCNC: 8 MMOL/L (ref 3–14)
BUN SERPL-MCNC: 9 MG/DL (ref 7–30)
CALCIUM SERPL-MCNC: 8.6 MG/DL (ref 8.5–10.1)
CHLORIDE SERPL-SCNC: 106 MMOL/L (ref 94–109)
CO2 SERPL-SCNC: 25 MMOL/L (ref 20–32)
CREAT SERPL-MCNC: 0.72 MG/DL (ref 0.52–1.04)
ERYTHROCYTE [DISTWIDTH] IN BLOOD BY AUTOMATED COUNT: 13.1 % (ref 10–15)
GFR SERPL CREATININE-BSD FRML MDRD: 86 ML/MIN/1.7M2
GLUCOSE SERPL-MCNC: 118 MG/DL (ref 70–99)
HCT VFR BLD AUTO: 41.5 % (ref 35–47)
HGB BLD-MCNC: 13.9 G/DL (ref 11.7–15.7)
INR PPP: 1.15 (ref 0.86–1.14)
MCH RBC QN AUTO: 31.4 PG (ref 26.5–33)
MCHC RBC AUTO-ENTMCNC: 33.5 G/DL (ref 31.5–36.5)
MCV RBC AUTO: 94 FL (ref 78–100)
PLATELET # BLD AUTO: 115 10E9/L (ref 150–450)
POTASSIUM SERPL-SCNC: 4.1 MMOL/L (ref 3.4–5.3)
RBC # BLD AUTO: 4.43 10E12/L (ref 3.8–5.2)
SODIUM SERPL-SCNC: 139 MMOL/L (ref 133–144)
WBC # BLD AUTO: 3.1 10E9/L (ref 4–11)

## 2017-08-21 PROCEDURE — 77386 ZZH IMRT TREATMENT DELIVERY, COMPLEX: CPT | Performed by: RADIOLOGY

## 2017-08-21 RX ORDER — LOPERAMIDE HCL 2 MG
2 CAPSULE ORAL PRN
COMMUNITY

## 2017-08-21 ASSESSMENT — COPD QUESTIONNAIRES: COPD: 1

## 2017-08-21 ASSESSMENT — LIFESTYLE VARIABLES: TOBACCO_USE: 0

## 2017-08-21 NOTE — PATIENT INSTRUCTIONS
Preparing for Your Surgery      Name:  Mary Anglin   MRN:  [id number]   :  1970   Today's Date:  2017     Arriving for surgery:  Surgery date:  2017  Surgery time:  7:30am  Arrival time:  5:30am  Please come to:       [facility] [address]  [address] [place] [address]  [id number]    -   parking is available in front of the hospital from 5:15 am to 8:00 pm    -  Stop at the Information Desk in the lobby    -   Inform the information person that you are here for surgery. An escort to 3c will be provided. If you would not like an escort, please proceed to 3C on the 3rd floor. [phone]     What can I eat or drink?  -  You may have solid food or milk products until 8 hours prior to your surgery.  -  You may have water, apple juice or 7up/Sprite until 2 hours prior to your surgery.    Which medicines can I take?  -  Do NOT take these medications in the morning, the day of surgery:  lasix    How do I prepare myself?  -  Take two showers: one the night before surgery; and one the morning of surgery.         Use Scrubcare or Hibiclens to wash from neck down.  You may use your own shampoo and conditioner. No other hair products.   -  Do NOT use lotion, powder, deodorant, or antiperspirant the day of your surgery.  -  Do NOT wear any makeup, fingernail polish or jewelry.  -Do not bring your own medications to the hospital, except for inhalers and eye drops.  -  Bring your ID and insurance card.    Questions or Concerns:  If you have questions or concerns, please call the  Preoperative Assessment Center, Monday-Friday 7AM-7PM:  [phone]    AFTER YOUR SURGERY  Breathing exercises   Breathing exercises help you recover faster. Take deep breaths and let the air out slowly. This will:     Help you wake up after surgery.    Help prevent complications like pneumonia.  Preventing complications will help you go home sooner.   We may give you a breathing device  (incentive spirometer) to encourage you to breathe deeply.   Nausea and vomiting   You may feel sick to your stomach after surgery; if so, let your nurse know.    Pain control:  After surgery, you may have pain. Our goal is to help you manage your pain. Pain medicine will help you feel comfortable enough to do activities that will help you heal.  These activities may include breathing exercises, walking and physical therapy.   To help your health care team treat your pain we will ask: 1) If you have pain  2) where it is located 3) describe your pain in your words  Methods of pain control include medications given by mouth, vein or by nerve block for some surgeries.  We may give you a pain control pump that will:  1) Deliver the medicine through a tube placed in your vein  2) Control the amount of medicine you receive  3) Allow you to push a button to deliver a dose of pain medicine  Sequential Compression Device (SCD) or Pneumo Boots:  You may need to wear SCD S on your legs or feet. These are wraps connected to a machine that pumps in air and releases it. The repeated pumping helps prevent blood clots from forming.

## 2017-08-21 NOTE — ANESTHESIA PREPROCEDURE EVALUATION
Anesthesia Evaluation     . Pt has had prior anesthetic. Type: General and MAC           ROS/MED HX    ENT/Pulmonary:     (+)SATYA risk factors obese, allergic rhinitis, COPD, , . .   (-) tobacco use   Neurologic:  - neg neurologic ROS   (+)other neuro Vertigo    Cardiovascular:     (+) Dyslipidemia, ----. : . . . :. . No previous cardiac testing      (-) taking anticoagulants/antiplatelets   METS/Exercise Tolerance:  3 - Able to walk 1-2 blocks without stopping   Hematologic:  - neg hematologic  ROS       Musculoskeletal:   (+) , , other musculoskeletal- Scoliosis of the spine.        GI/Hepatic: Comment: GERD is controlled with diet.    (+) GERD Other,       Renal/Genitourinary:  - ROS Renal section negative       Endo:     (+) type II DM Last HgA1c: 7 date: 17 Not using insulin - not using insulin pump Normal glucose range: 130s not previously admitted for DM/DKA Obesity, .   (-) Type I DM   Psychiatric:     (+) psychiatric history depression      Infectious Disease:   (+) MRSA,       Malignancy:   (+) Malignancy History of Other  Other CA History of endometrial cancer, S/P JEMAL-BSO in .  Recurrence identified in .   Active status post Surgery         Other:    (+) No chance of pregnancy C-spine cleared: N/A, no H/O Chronic Pain,no other significant disability   - neg other ROS                 Physical Exam  Normal systems: cardiovascular, pulmonary and dental    Airway   Mallampati: II  TM distance: >3 FB  Neck ROM: full    Dental     Cardiovascular   Rhythm and rate: regular and normal      Pulmonary    breath sounds clear to auscultation    Other findings:   Imagin/15/17 CT Chest:  FINDINGS:   No definite consolidations, effusions, or pulmonary edema are noted. No discrete pulmonary nodules are visualized.  The tracheobronchial tree is patent. Heart size is enlarged.No adenopathy is noted in the mediastinum or hilum. The central pulmonary artery and the ascending aorta have a normal  caliber.Visualized portions of the lower neck include symmetric lobes of the thyroid. No adenopathy is noted in the lower neck regions. Symmetric, enlarged lymph nodes are visualized in the axillary regions. Osseous structures have spondylolysis at multiple levels in the spine.     IMPRESSION:   1. Mild cardiomegaly.  2.Otherwise normal CT chest.           6/15/17 CT Abdomen/Pelvis:.  FINDINGS:   There is diffuse low attenuation in the liver, compatible with hepatic steatosis. The spleen, pancreas, and right adrenal appear normal. The left adrenal contains a 1.2 cm mass in the lateral limb (I-109) which has attenuation values too high to represent a typical adenoma (I-109), HU up to 23 HU. The pancreas has a normal appearance. There is a stable 1.0 cm   celiac lymph node (I-107), sub cm lymph nodes in the left periaortic region, and there is a stable enlarged 1.0 cm lymph node posterior to the right renal vein (I-122).   IMPRESSION:  1. Post therapeutic changes and stable adenopathy.  2. No areas with definite malignancy recurrence.    CT PELVIS FINDINGS:  There is no evidence of small or of large bowel obstruction. The terminal ileum contains oral contrast and has a normal caliber. Appendix is not identified, but no pericecal inflammation is noted. There is sigmoid colonic diverticulosis without diverticulitis. Osseous structures have   normal mineralization and appearance. The uterus appears surgically absent. There is no obvious adenopathy in the pelvis but there are surgical clips adjacent to the common iliac arteries, likely from previous lymph node dissection.   IMPRESSION:  1. Sigmoid colonic diverticulosis without diverticulitis.      Lab Results      Component                Value               Date                      WBC                      3.1                 08/21/2017            Lab Results      Component                Value               Date                      RBC                      4.43                 08/21/2017            Lab Results      Component                Value               Date                      HGB                      13.9                08/21/2017            Lab Results      Component                Value               Date                      HCT                      41.5                08/21/2017            No components found for: MCT  Lab Results      Component                Value               Date                      MCV                      94                  08/21/2017            Lab Results      Component                Value               Date                      MCH                      31.4                08/21/2017            Lab Results      Component                Value               Date                      MCHC                     33.5                08/21/2017            Lab Results      Component                Value               Date                      RDW                      13.1                08/21/2017            Lab Results      Component                Value               Date                      PLT                      115                 08/21/2017              Last Basic Metabolic Panel:  Lab Results      Component                Value               Date                      NA                       139                 08/21/2017             Lab Results      Component                Value               Date                      POTASSIUM                4.1                 08/21/2017            Lab Results      Component                Value               Date                      CHLORIDE                 106                 08/21/2017            Lab Results      Component                Value               Date                      VIRI                      8.6                 08/21/2017            Lab Results      Component                Value               Date                      CO2                      25                  08/21/2017             Lab Results      Component                Value               Date                      BUN                      9                   08/21/2017            Lab Results      Component                Value               Date                      CR                       0.72                08/21/2017            Lab Results      Component                Value               Date                      GLC                      118                 08/21/2017              INR    1.15   8/21/2017           PAC Discussion and Assessment    ASA Classification: 3  Case is suitable for: [place]  Anesthetic techniques and relevant risks discussed: GA  Invasive monitoring and risk discussed: Yes  Types:   Possibility and Risk of blood transfusion discussed: Yes  NPO instructions given:   Additional anesthetic preparation and risks discussed:   Needs early admission to pre-op area:   Other:     PAC Resident/NP Anesthesia Assessment:  Mary Anglin is a 46 yo female scheduled for Insertion Interstitial Needle with Ultrasound Guidance on 9/13/2017 by Dr. Pelaez and Sofía. PAC referral by Dr. Gore for assessment and optimization of anesthesia. Previous anesthesia, recently at [facility], without complications.    1) Cardiac: No known cardiac diagnosis or symptoms  2) Pulmonary: Never smoked, denies pulmonary diagnosis or symptoms  3) GI: GERD, diet control  4) Endo: DM II, daily metformin and last A1C 5/2017 was 7. BMI 49.5 with large pendulous abdomen  5) GynOnc:  History of endometrial cancer in the past, and underwent total abdominal hysterectomy with bilateral sapingo-oophorectomy in 2004.  She recently noted vaginal bleeding, and examination revealed a vaginal lesion.  This was positive for recurrent endometrial cancer.  At the 6/28 appointment, plans were made for further evaluation with PET and MRI.  These studies did not show any evidence of distant disease.  She was reevaluated on 7/17, and localized radiation  therapy was recommended.  The patient was evaluated by Dr. Ambriz with Radiation Oncology on 7/19, and they recommended radiotherapy.     Can walk 2 block, mostly limited by BMI. LE edema and discoloration consistent with venous stasis.     Pt also evaluated by Dr. Gage. Please see recommendations below. Labs drawn today.  I spent 20 minutes face to face with pt, assessing, examining, and educating        Reviewed and Signed by PAC Mid-Level Provider/Resident  Mid-Level Provider/Resident: Kayleen Mckeon, ES  Date: 8/21/2017  Time: 1130    Attending Anesthesiologist Anesthesia Assessment:  STAFF:  47 y.o. woman with cervical disease for XRT needle template  by Valentina Lea et al  using EPIDURAL + general anesthesia.   History summarized above.  Elements to consider are:  1.  Major truncal obesity  2. Limited exercise tolerance (3 - 4 METS) due to weight   3. Possible SATYA  Instructions given and questions answered.   Final plans by anesthesiology team on DOS.   ---rcp      Reviewed and Signed by PAC Anesthesiologist  Anesthesiologist: tc  Date: 8/21  Time:   Pass/Fail: Pass  Disposition:     PAC Pharmacist Assessment:        Pharmacist:   Date:   Time:                           .

## 2017-08-21 NOTE — H&P
Pre-Operative H & P     CC:  Preoperative exam to assess for increased cardiopulmonary risk while undergoing surgery and anesthesia.    Date of Encounter: 8/21/2017  Primary Care Physician:  Mikel Wells  Mary Anglin is a 47 year old female who presents for pre-operative H & P in preparation for Insertion Interstitial Needle with Ultrasound Guidance with Dr. Ambriz and Sofía on 9/13/2017 at [facility].     History is obtained from the patient.   History of endometrial cancer in the past, and underwent total abdominal hysterectomy with bilateral sapingo-oophorectomy in 2004.  She recently noted vaginal bleeding, and examination revealed a vaginal lesion.  This was positive for recurrent endometrial cancer.  At the 6/28 appointment, plans were made for further evaluation with PET and MRI.  These studies did not show any evidence of distant disease.  She was reevaluated on 7/17, and localized radiation therapy was recommended.  The patient was evaluated by Dr. Ambriz with Radiation Oncology on 7/19, and they recommended radiotherapy.        Past Medical History  Past Medical History:   Diagnosis Date     Cellulitis of the lower extremities.      Chronic edema of the lower extremities      Depression      Diabetes (H)      Endometrial cancer (H)      GERD (gastroesophageal reflux disease)      HLD (hyperlipidemia)      Morbid obesity (H)      MRSA (methicillin resistant Staphylococcus aureus) infection      Vertigo        Past Surgical History  Past Surgical History:   Procedure Laterality Date     C TOTAL ABDOM HYSTERECTOMY  2004     COLONOSCOPY       HERNIORRHAPHY UMBILICAL       INSERT RADIATION SEED MARKER CERVIX N/A 7/21/2017    Procedure: INSERT RADIATION SEED MARKER CERVIX;  Pelvic Exam, Insertion Radiation Gold Seed Markers;  Surgeon: Jarrod Kessler MD;  Location: [facility] OR     SALPINGO-OOPHORECTOMY BILATERAL       TONSILLECTOMY        UPPER GI ENDOSCOPY         Hx of Blood transfusions/reactions: none    Hx of abnormal bleeding or anti-platelet use: none    Menstrual history: No LMP recorded. Patient has had a hysterectomy.:     Steroid use in the last year: none    Personal or FH with difficulty with Anesthesia:  None      Prior to Admission Medications  Current Outpatient Prescriptions   Medication Sig Dispense Refill     loperamide (ANTI-DIARRHEAL) 2 MG capsule Take 2 mg by mouth as needed for diarrhea       atorvastatin (LIPITOR) 10 MG tablet Take 0.5 tablets (5 mg total) by mouth every night at bedtime.       doxepin (SINEQUAN) 10 MG capsule Take 20 mg by mouth At Bedtime        escitalopram (LEXAPRO) 20 MG tablet Take 20 mg by mouth At Bedtime        furosemide (LASIX) 40 MG tablet Take 40 mg by mouth 2 times daily        metFORMIN (GLUCOPHAGE) 500 MG tablet Take 500 mg by mouth daily (with dinner)        clotrimazole (LOTRIMIN) 1 % cream Apply topically 2 times daily 40 g 1     albuterol (PROAIR HFA/PROVENTIL HFA/VENTOLIN HFA) 108 (90 BASE) MCG/ACT Inhaler Inhale 2 puffs into the lungs every 4 hours as needed        blood glucose monitoring (ACCU-CHEK BRADLEY PLUS) meter device kit 1 Device       blood glucose monitoring (ACCU-CHEK BRADLEY PLUS) test strip 1 time per day       clobetasol (TEMOVATE) 0.05 % cream apply topically PRN       docusate sodium (COLACE) 100 MG capsule Take 100 mg by mouth 2 times daily as needed        Lancet Device MISC As Directed       Melatonin 5 MG TBDP Take 2.5-5 mg by mouth nightly as needed        mupirocin (BACTROBAN) 2 % ointment Apply to affected area 2 times a day as needed for other (Specify) (rash on back of neck)         Allergies  Allergies   Allergen Reactions     Penicillin G Other (See Comments)     Azithromycin Rash     Benzonatate Unknown     Cephalexin Unknown     Sulfamethoxazole W/Trimethoprim Rash     Bupropion Other (See Comments)     No rash     Clindamycin Other (See Comments)     Aspirin  Other (See Comments)     Pt refuses to take ASA       Social History  Social History     Social History     Marital status: Single     Spouse name: N/A     Number of children: N/A     Years of education: N/A     Occupational History     Not on file.     Social History Main Topics     Smoking status: Never Smoker     Smokeless tobacco: Never Used     Alcohol use No     Drug use: No     Sexual activity: Not Currently     Other Topics Concern     Not on file     Social History Narrative       Family History  Family History   Problem Relation Age of Onset     Skin Cancer Father      KIDNEY DISEASE Father      Required hemodialysis     HEART DISEASE Maternal Grandfather      MENTAL ILLNESS Mother      Catatonia after loss of mother     Breast Cancer Paternal Grandmother      CEREBROVASCULAR DISEASE Maternal Grandmother      Hypertension Maternal Grandmother      DIABETES Maternal Grandmother      DIABETES Paternal Grandfather          Anesthesia Evaluation     . Pt has had prior anesthetic. Type: General and MAC           ROS/MED HX    ENT/Pulmonary:     (+)SATYA risk factors obese, allergic rhinitis, , . .   (-) tobacco use   Neurologic:  - neg neurologic ROS   (+)other neuro Vertigo    Cardiovascular:     (+) Dyslipidemia, ----. : . . . :. . No previous cardiac testing      (-) taking anticoagulants/antiplatelets   METS/Exercise Tolerance:  3 - Able to walk 1-2 blocks without stopping   Hematologic:  - neg hematologic  ROS       Musculoskeletal:   (+) , , other musculoskeletal- Scoliosis of the spine.        GI/Hepatic: Comment: GERD is controlled with diet.    (+) GERD Other,       Renal/Genitourinary:  - ROS Renal section negative       Endo:     (+) type II DM Last HgA1c: 7 date: 5/22/17 Not using insulin - not using insulin pump Normal glucose range: 130s not previously admitted for DM/DKA Obesity, .   (-) Type I DM   Psychiatric:     (+) psychiatric history depression      Infectious Disease:   (+) MRSA,      "  Malignancy:   (+) Malignancy History of Other  Other CA History of endometrial cancer, S/P JEMAL-BSO in 2004.  Recurrence identified in June.   Active status post Surgery         Other:    (+) No chance of pregnancy C-spine cleared: N/A, no H/O Chronic Pain,no other significant disability   - neg other ROS         Physical Exam  Normal systems: cardiovascular, pulmonary and dental    Airway   Mallampati: II  TM distance: >3 FB  Neck ROM: full    Dental     Cardiovascular   Rhythm and rate: regular and normal      Pulmonary    breath sounds clear to auscultation           The complete review of systems is negative other than noted in the HPI or here.   Temp: 98.3  F (36.8  C) Temp src: Oral BP: 124/82 Pulse: 67   Resp: 16 SpO2: 94 %         287 lbs 9.6 oz  5' 4\"   Body mass index is 49.37 kg/(m^2).       Physical Exam  Constitutional: Awake, alert, cooperative, no apparent distress, and appears stated age.  Eyes: Pupils equal, round and reactive to light, extra ocular muscles intact, sclera clear, conjunctiva normal.  HENT: Normocephalic, oral pharynx with moist mucus membranes, good dentition. No goiter appreciated.   Respiratory: Clear to auscultation bilaterally, no crackles or wheezing.  Cardiovascular: Regular rate and rhythm, normal S1 and S2, and no murmur noted.  Carotids +2, no bruits. Bilateral LE edema. Discoloration to LE. Palpable pulses to radial  DP and PT arteries.   GI: Normal bowel sounds, soft, non-distended, non-tender, no masses palpated, no hepatosplenomegaly.  Surgical scars: abdominal  Lymph/Hematologic: No cervical lymphadenopathy and no supraclavicular lymphadenopathy.  Genitourinary:  deferred  Skin: Warm and dry.  No rashes at anticipated surgical site.   Musculoskeletal: Full ROM of neck. There is no redness, warmth, or swelling of the joints. Gross motor strength is normal.    Neurologic: Awake, alert, oriented to name, place and time. Cranial nerves II-XII are grossly intact. Gait is " normal.   Neuropsychiatric: Calm, cooperative. Normal affect.     Labs: (personally reviewed)  Lab Results   Component Value Date    WBC 3.1 2017     Lab Results   Component Value Date    RBC 4.43 2017     Lab Results   Component Value Date    HGB 13.9 2017     Lab Results   Component Value Date    HCT 41.5 2017     No components found for: MCT  Lab Results   Component Value Date    MCV 94 2017     Lab Results   Component Value Date    MCH 31.4 2017     Lab Results   Component Value Date    MCHC 33.5 2017     Lab Results   Component Value Date    RDW 13.1 2017     Lab Results   Component Value Date     2017       Last Basic Metabolic Panel:  Lab Results   Component Value Date     2017      Lab Results   Component Value Date    POTASSIUM 4.1 2017     Lab Results   Component Value Date    CHLORIDE 106 2017     Lab Results   Component Value Date    VIRI 8.6 2017     Lab Results   Component Value Date    CO2 25 2017     Lab Results   Component Value Date    BUN 9 2017     Lab Results   Component Value Date    CR 0.72 2017     Lab Results   Component Value Date     2017       INR   1.15    2017      EKG: Personally reviewed but formal cardiology read pending: not indicated for procedure        Imagin/15/17 CT Chest:  FINDINGS:   No definite consolidations, effusions, or pulmonary edema are noted. No discrete pulmonary nodules are visualized.  The tracheobronchial tree is patent. Heart size is enlarged.No adenopathy is noted in the mediastinum or hilum. The central pulmonary artery and the ascending aorta have a normal caliber.Visualized portions of the lower neck include symmetric lobes of the thyroid. No adenopathy is noted in the lower neck regions. Symmetric, enlarged lymph nodes are visualized in the axillary regions. Osseous structures have spondylolysis at multiple levels in the spine.      IMPRESSION:   1. Mild cardiomegaly.  2.Otherwise normal CT chest.       6/15/17 CT Abdomen/Pelvis:.  FINDINGS:   There is diffuse low attenuation in the liver, compatible with hepatic steatosis. The spleen, pancreas, and right adrenal appear normal. The left adrenal contains a 1.2 cm mass in the lateral limb (I-109) which has attenuation values too high to represent a typical adenoma (I-109), HU up to 23 HU. The pancreas has a normal appearance. There is a stable 1.0 cm   celiac lymph node (I-107), sub cm lymph nodes in the left periaortic region, and there is a stable enlarged 1.0 cm lymph node posterior to the right renal vein (I-122).   IMPRESSION:  1. Post therapeutic changes and stable adenopathy.  2.   No areas with definite malignancy recurrence.    CT PELVIS FINDINGS:  There is no evidence of small or of large bowel obstruction. The terminal ileum contains oral contrast and has a normal caliber. Appendix is not identified, but no pericecal inflammation is noted. There is sigmoid colonic diverticulosis without diverticulitis. Osseous structures have   normal mineralization and appearance. The uterus appears surgically absent. There is no obvious adenopathy in the pelvis but there are surgical clips adjacent to the common iliac arteries, likely from previous lymph node dissection.   IMPRESSION:  1. Sigmoid colonic diverticulosis without diverticulitis.    ASSESSMENT and PLAN  Mary Anglin is a 47 year old female scheduled to undergo Insertion Interstitial Needle with Ultrasound Guidance. She has the following specific operative considerations:   - RCRI : Low serious cardiac risks.  0.4% risk of major adverse cardiac event.   - Anesthesia considerations:  Refer to PAC assessment in anesthesia records  - VTE risk: 0.5% due to BMI  - SATYA # of risks = intermediate risk  - Post-op delirium risk: low risk  - Risk of PONV score = 2.  If > 2, anti-emetic intervention recommended.      Previous anesthesia,  recently at [facility], without complications.  1) Cardiac: No known cardiac diagnosis or symptoms  2) Pulmonary: Never smoked, denies pulmonary diagnosis or symptoms  3) GI: GERD, diet control  4) Endo: DM II, daily metformin and last A1C 5/2017 was 7. BMI 49.5 with large pendulous abdomen  5) GynOnc:  History of endometrial cancer in the past, and underwent total abdominal hysterectomy with bilateral sapingo-oophorectomy in 2004.  She recently noted vaginal bleeding, and examination revealed a vaginal lesion.  This was positive for recurrent endometrial cancer.  At the 6/28 appointment, plans were made for further evaluation with PET and MRI.  These studies did not show any evidence of distant disease.  She was reevaluated on 7/17, and localized radiation therapy was recommended.  The patient was evaluated by Dr. Ambriz with Radiation Oncology on 7/19, and they recommended radiotherapy.     Can walk 2 block, mostly limited by BMI. LE edema and discoloration consistent with venous stasis.  Pt optimized for surgery. AVS with information on surgery time/arrival time, meds and NPO status given by nursing staff       Patient was discussed with Dr Gage.    ES Adamson CNS  Preoperative Assessment Center  [facility]  Clinic and Surgery Center  Phone: [phone]  Fax: [phone]

## 2017-08-22 ENCOUNTER — APPOINTMENT (OUTPATIENT)
Dept: RADIATION ONCOLOGY | Facility: CLINIC | Age: 47
End: 2017-08-22
Attending: RADIOLOGY
Payer: MEDICAID

## 2017-08-22 PROCEDURE — 77386 ZZH IMRT TREATMENT DELIVERY, COMPLEX: CPT | Performed by: RADIOLOGY

## 2017-08-23 ENCOUNTER — APPOINTMENT (OUTPATIENT)
Dept: RADIATION ONCOLOGY | Facility: CLINIC | Age: 47
End: 2017-08-23
Attending: RADIOLOGY
Payer: MEDICAID

## 2017-08-23 DIAGNOSIS — B49 FUNGAL INFECTION: ICD-10-CM

## 2017-08-23 DIAGNOSIS — R23.4 SKIN DESQUAMATION: Primary | ICD-10-CM

## 2017-08-23 PROCEDURE — 77386 ZZH IMRT TREATMENT DELIVERY, COMPLEX: CPT | Performed by: RADIOLOGY

## 2017-08-23 RX ORDER — SILVER 32 PPM
GEL (GRAM) TOPICAL 2 TIMES DAILY
Qty: 85.05 G | Refills: 1 | Status: SHIPPED | OUTPATIENT
Start: 2017-08-23 | End: 2017-08-23

## 2017-08-23 RX ORDER — SILVER 32 PPM
GEL (GRAM) TOPICAL 2 TIMES DAILY
Qty: 85.05 G | Refills: 1 | Status: SHIPPED | OUTPATIENT
Start: 2017-08-23 | End: 2018-11-09

## 2017-08-23 RX ORDER — NYSTATIN 100000 U/G
CREAM TOPICAL 3 TIMES DAILY
Qty: 90 G | Refills: 1 | Status: SHIPPED | OUTPATIENT
Start: 2017-08-23 | End: 2017-09-06

## 2017-08-23 NOTE — LETTER
2017       RE: Mary Anglin  [address]   [facility] [address]     Dear Colleague,    Thank you for referring your patient, Mary Anglin, to the RADIATION ONCOLOGY CLINIC. Please see a copy of my visit note below.    [facility]  SPECIALIZING IN BREAKTHROUGHS  Radiation Oncology    On Treatment Visit Note      Mary Anglin      Date: 2017   MRN: [id number]   : 1970  Diagnosis: recurrent endometrial cancer      Reason for Visit:  On Radiation Treatment Visit     Treatment Summary to Date  Treatment Site: pelvis Current Dose: 3240/4500+ cGy Fractions: 18/25fx       Chemotherapy  Chemo concurrent with radx?: No    Subjective:   Ms. Anglin reports increased discomfort in the skin fold under her pannus.  She states that when her skin gets like this, use of antifungal cream has helped in the past.  Her other symptoms including nausea and diarrhea are well controlled.     Nursing ROS:      Skin  Skin Reaction: 0 - No changes           Gastrointestinal  Nausea: 1 - One to two episodes of nausea/24  Diarrhea: 1 - Abdominal cramping, two or less soft or liquid bowel movements        Pain Assessment  0-10 Pain Scale: 0      Objective:   There were no vitals taken for this visit.  Gen: Appears well, in no acute distress  Skin: Lifting the pannus reveals diffuse erythema and moist desquamation with a strong odor    Labs:  CBC RESULTS:   Recent Labs   Lab Test  17   1223   WBC  3.1*   RBC  4.43   HGB  13.9   HCT  41.5   MCV  94   MCH  31.4   MCHC  33.5   RDW  13.1   PLT  115*     ELECTROLYTES:  Recent Labs   Lab Test  17   1223   NA  139   POTASSIUM  4.1   CHLORIDE  106   VIRI  8.6   CO2  25   BUN  9   CR  0.72   GLC  118*       Assessment:    Tolerating radiation therapy well.  All questions and concerns addressed.    Toxicities:  Nausea: Grade 1: Loss of appetite without alteration in eating habits  Diarrhea: Grade 1: Increase of <4 stools per  day over baseline; mild increase in ostomy output compared to baseline  Dermatitis: Grade 2: Moderate to brisk erythema; patchy moist desquamation, mostly confined to skin folds and creases; moderate erythema    Plan:   1. Continue current therapy.    2. Prescribed SilvrSTAT.  She will also continue to use anti-fungal cream.    3. Also advised her to keep the skin dry if possible.      Mosaiq chart and setup information reviewed  MVCT/IGRT images checked                  Jarrod Kessler MD/PhD  [phone] clinic  Pager [phone]    Please do not send letter to referring physician.      Again, thank you for allowing me to participate in the care of your patient.      Sincerely,    Jarrod Kessler MD

## 2017-08-23 NOTE — LETTER
Date:August 31, 2017      Provider requested that no letter be sent. Do not send.       [facility] Information

## 2017-08-23 NOTE — MR AVS SNAPSHOT
After Visit Summary   8/23/2017    Mary Anglin    MRN: [id number]           Patient Information     Date Of Birth          1970        Visit Information        Provider Department      8/23/2017 8:45 AM Jarrod Kessler MD Radiation Oncology Clinic        Today's Diagnoses     Skin desquamation    -  1    Fungal infection           Follow-ups after your visit        Your next 10 appointments already scheduled     Aug 24, 2017  8:30 AM CDT   EXTERNAL RADIATION TREATMENT with P RAD ONC RODRI   Radiation Oncology Clinic ([facility])    [facility]  1st Floor  [address] [address]   [phone]            Aug 25, 2017  8:30 AM CDT   EXTERNAL RADIATION TREATMENT with P RAD ONC RODRI   Radiation Oncology Clinic ([facility])    [facility]  1st Floor  [address] [address]   [phone]            Aug 28, 2017  8:30 AM CDT   EXTERNAL RADIATION TREATMENT with P RAD ONC RODRI   Radiation Oncology Clinic ([facility])    [facility]  1st Floor  [address] [address]   [phone]            Aug 29, 2017  8:30 AM CDT   EXTERNAL RADIATION TREATMENT with P RAD ONC RODRI   Radiation Oncology Clinic ([facility])    [facility]  1st Floor  [address] [address]   [phone]            Aug 29, 2017  8:45 AM CDT   ON TREATMENT VISIT with Hailey Ambriz MD   Radiation Oncology Clinic ([facility])    [facility]  1st Floor  [address] [address]   [phone]            Aug 30, 2017  8:30 AM CDT   EXTERNAL RADIATION TREATMENT with [facility] RAD ONC RODRI   Radiation Oncology Clinic ([facility])    [facility]  1st Floor  [address] [address]   [phone]            Aug  31, 2017  8:30 AM CDT   EXTERNAL RADIATION TREATMENT with [facility] RAD ONC RODRI   Radiation Oncology Clinic ([facility])    [facility]  1st Floor  [address] [address]   [phone]            Sep 01, 2017  8:30 AM CDT   EXTERNAL RADIATION TREATMENT with [facility] RAD ONC RODRI   Radiation Oncology Clinic ([facility])    [facility]  1st Floor  [address] [address]   [phone]            Sep 01, 2017 10:00 AM CDT   MR PELVIS W/O & W CONTRAST with UUMR2   [facility], [place], MRI ([facility], [place])    [address] [facility] [address]   [phone]           Take your medicines as usual, unless your doctor tells you not to. Bring a list of your current medicines to your exam (including vitamins, minerals and over-the-counter drugs).  You will be given intravenous contrast for this exam. To prepare:   The day before your exam, drink extra fluids at least six 8-ounce glasses (unless your doctor tells you to restrict your fluids).   Have a blood test (creatinine test) within 30 days of your exam. Go to your clinic or Diagnostic Imaging Department for this test.  The MRI machine uses a strong magnet. Please wear clothes without metal (snaps, zippers). A sweatsuit works well, or we may give you a hospital gown.  Please remove any body piercings and hair extensions before you arrive. You will also remove watches, jewelry, hairpins, wallets, dentures, partial dental plates and hearing aids. You may wear contact lenses, and you may be able to wear your rings. We have a safe place to keep your personal items, but it is safer to leave them at home.   **IMPORTANT** THE INSTRUCTIONS BELOW ARE ONLY FOR THOSE PATIENTS WHO HAVE BEEN TOLD THEY WILL RECEIVE SEDATION OR GENERAL ANESTHESIA DURING THEIR MRI PROCEDURE:  IF YOU WILL RECEIVE SEDATION (take medicine to help you  relax during your exam):   You must get the medicine from your doctor before you arrive. Bring the medicine to the exam. Do not take it at home.   Arrive one hour early. Bring someone who can take you home after the test. Your medicine will make you sleepy. After the exam, you may not drive, take a bus or take a taxi by yourself.   No eating 8 hours before your exam. You may have clear liquids up until 4 hours before your exam. (Clear liquids include water, clear tea, black coffee and fruit juice without pulp.)  IF YOU WILL RECEIVE ANESTHESIA (be asleep for your exam):   Arrive 1 1/2 hours early. Bring someone who can take you home after the test. You may not drive, take a bus or take a taxi by yourself.   No eating 8 hours before your exam. You may have clear liquids up until 4 hours before your exam. (Clear liquids include water, clear tea, black coffee and fruit juice without pulp.)  Please call the Imaging Department at your exam site with any questions.            Sep 13, 2017  7:25 AM CDT   (Arrive by 5:30 AM)   TCT/SIM Suite Visit with Hailey Ambriz MD   Radiation Oncology Clinic ([facility])    [facility]  1st Floor  [address] [address] [phone]              Who to contact     Please call your clinic at [phone] to:    Ask questions about your health    Make or cancel appointments    Discuss your medicines    Learn about your test results    Speak to your doctor   If you have compliments or concerns about an experience at your clinic, or if you wish to file a complaint, please contact [facility] Patient Relations at [phone] or email us at [email or web address]         Additional Information About Your Visit        [email or web address] Information     [employer] is an electronic gateway that provides easy, online access to your medical records. With [employer], you can request a clinic appointment, read your  test results, renew a prescription or communicate with your care team.     To sign up for MyChart visit the website at [email or web address]   You will be asked to enter the access code listed below, as well as some personal information. Please follow the directions to create your username and password.     Your access code is: [email or web address]  Expires: 2017 10:37 AM     Your access code will  in 90 days. If you need help or a new code, please contact your [facility] or call [phone] for assistance.        Care EveryWhere ID     This is your Care EveryWhere ID. This could be used by other organizations to access your [facility] medical records  [id number]         Blood Pressure from Last 3 Encounters:   17 124/82   17 150/88   17 138/81    Weight from Last 3 Encounters:   17 130.5 kg (287 lb 9.6 oz)   08/15/17 132 kg (291 lb)   17 129.5 kg (285 lb 8 oz)              Today, you had the following     No orders found for display         Today's Medication Changes          These changes are accurate as of: 17 10:02 AM.  If you have any questions, ask your nurse or doctor.               Start taking these medicines.        Dose/Directions    nystatin cream   Commonly known as:  MYCOSTATIN   Used for:  Fungal infection   Started by:  Jarrod Kessler MD        Apply topically 3 times daily for 14 days   Quantity:  90 g   Refills:  1       SILVRSTAT WOUND DRESSING Gel   Used for:  Skin desquamation   Started by:  Jarrod Kessler MD        Externally apply topically 2 times daily   Quantity:  85.05 g   Refills:  1            Where to get your medicines      These medications were sent to [facility] [id number]  [place] [address] [facility]  [address] [address] [address]    Hours:  24-hours Phone:  [phone]     nystatin cream    SILVRSTAT WOUND DRESSING Gel                 Primary Care Provider Office Phone # Fax #    Mikel Wells [phone]       [facility] [address] [facility] [address]        Equal Access to Services     ALEX ROSAS : Hadii aad ku hadkadeno Socollinsali, waaxda luqadaha, qaybta kaalmada ademackenzie, loraine paniaguakris beardalejandracarlos harris. So [facility] [phone].    ATENCIÓN: Si habla español, tiene a do disposición servicios gratuitos de asistencia lingüística. Llame al [phone].    We comply with applicable federal civil rights laws and [place] laws. We do not discriminate on the basis of race, color, national origin, age, disability sex, sexual orientation or gender identity.            Thank you!     Thank you for choosing [facility]  for your care. Our goal is always to provide you with excellent care. Hearing back from our patients is one way we can continue to improve our services. Please take a few minutes to complete the written survey that you may receive in the mail after your visit with us. Thank you!             Your Updated Medication List - Protect others around you: Learn how to safely use, store and throw away your medicines at www.disposemymeds.org.          This list is accurate as of: 8/23/17 10:02 AM.  Always use your most recent med list.                   Brand Name Dispense Instructions for use Diagnosis    ACCU-CHEK BRADLEY PLUS test strip   Generic drug:  blood glucose monitoring      1 time per day        albuterol 108 (90 BASE) MCG/ACT Inhaler    PROAIR HFA/PROVENTIL HFA/VENTOLIN HFA     Inhale 2 puffs into the lungs every 4 hours as needed        ANTI-DIARRHEAL 2 MG capsule   Generic drug:  loperamide      Take 2 mg by mouth as needed for diarrhea        atorvastatin 10 MG tablet    LIPITOR     Take 0.5 tablets (5 mg total) by mouth every night at bedtime.        blood glucose monitoring meter device kit      1 Device        clobetasol 0.05 % cream    TEMOVATE     apply topically  PRN        clotrimazole 1 % cream    LOTRIMIN    40 g    Apply topically 2 times daily    Fungal infection of skin, Endometrial cancer (H)       docusate sodium 100 MG capsule    COLACE     Take 100 mg by mouth 2 times daily as needed        doxepin 10 MG capsule    SINEquan     Take 20 mg by mouth At Bedtime        escitalopram 20 MG tablet    LEXAPRO     Take 20 mg by mouth At Bedtime        furosemide 40 MG tablet    LASIX     Take 40 mg by mouth 2 times daily        Lancet Device Misc      As Directed        Melatonin 5 MG Tbdp      Take 2.5-5 mg by mouth nightly as needed        metFORMIN 500 MG tablet    GLUCOPHAGE     Take 500 mg by mouth daily (with dinner)        mupirocin 2 % ointment    BACTROBAN     Apply to affected area 2 times a day as needed for other (Specify) (rash on back of neck)        nystatin cream    MYCOSTATIN    90 g    Apply topically 3 times daily for 14 days    Fungal infection       SILVRSTAT WOUND DRESSING Gel     85.05 g    Externally apply topically 2 times daily    Skin desquamation

## 2017-08-24 ENCOUNTER — APPOINTMENT (OUTPATIENT)
Dept: RADIATION ONCOLOGY | Facility: CLINIC | Age: 47
End: 2017-08-24
Attending: RADIOLOGY
Payer: MEDICAID

## 2017-08-24 PROCEDURE — 77386 ZZH IMRT TREATMENT DELIVERY, COMPLEX: CPT | Performed by: RADIOLOGY

## 2017-08-25 ENCOUNTER — APPOINTMENT (OUTPATIENT)
Dept: RADIATION ONCOLOGY | Facility: CLINIC | Age: 47
End: 2017-08-25
Attending: RADIOLOGY
Payer: MEDICAID

## 2017-08-25 PROCEDURE — 77336 RADIATION PHYSICS CONSULT: CPT | Performed by: RADIOLOGY

## 2017-08-25 PROCEDURE — 77386 ZZH IMRT TREATMENT DELIVERY, COMPLEX: CPT | Performed by: RADIOLOGY

## 2017-08-28 ENCOUNTER — APPOINTMENT (OUTPATIENT)
Dept: RADIATION ONCOLOGY | Facility: CLINIC | Age: 47
End: 2017-08-28
Attending: RADIOLOGY
Payer: MEDICAID

## 2017-08-28 PROCEDURE — 77386 ZZH IMRT TREATMENT DELIVERY, COMPLEX: CPT | Performed by: RADIOLOGY

## 2017-08-29 ENCOUNTER — OFFICE VISIT (OUTPATIENT)
Dept: RADIATION ONCOLOGY | Facility: CLINIC | Age: 47
End: 2017-08-29
Attending: RADIOLOGY
Payer: MEDICAID

## 2017-08-29 VITALS — WEIGHT: 288 LBS | BODY MASS INDEX: 49.44 KG/M2

## 2017-08-29 DIAGNOSIS — C54.1 ENDOMETRIAL CANCER (H): Primary | ICD-10-CM

## 2017-08-29 PROCEDURE — 77386 ZZH IMRT TREATMENT DELIVERY, COMPLEX: CPT | Performed by: RADIOLOGY

## 2017-08-29 NOTE — PROGRESS NOTES
[facility]  SPECIALIZING IN BREAKTHROUGHS  Radiation Oncology    On Treatment Visit Note      Mary Anglin      Date: 2017   MRN: [id number]   : 1970  Diagnosis: recurrent endometrial cancer      Reason for Visit:  On Radiation Treatment Visit     Treatment Summary to Date  Treatment Site: pelvis Current Dose: 3960/4500+ cGy Fractions: fx       Chemotherapy  Chemo concurrent with radx?: No    Subjective:   Continues to have worsening of skin reaction underneath pannus. She did not get Silvrstat, as her insurance wouldn't cover it. She has been using nystatin cream without any significant improvement. She otherwise is doing well.      Nursing ROS:   Nutrition Alteration  Diet Type: Patient's Preference  Skin  Skin Reaction: 3 - Moist desquamation in areas other than skin folds and creases, bleeding induced by minor trauma or abrasion  Skin Note: nystatin           Gastrointestinal  Nausea: 1 - One to two episodes of nausea/24  Diarrhea: 1 - Abdominal cramping, two or less soft or liquid bowel movements        Pain Assessment  0-10 Pain Scale: 2  Pain Note: groin pain      Objective:   Wt 130.6 kg (288 lb)  BMI 49.44 kg/m2  Gen: Appears well, in no acute distress  Skin: Diffuse erythema of skin underneath pannus. Skin appears moist. Some small areas of moist desquamation near the groin. This could be radiation dermatitis vs fungal infection.     Labs:  CBC RESULTS:   Recent Labs   Lab Test  17   1223   WBC  3.1*   RBC  4.43   HGB  13.9   HCT  41.5   MCV  94   MCH  31.4   MCHC  33.5   RDW  13.1   PLT  115*     ELECTROLYTES:  Recent Labs   Lab Test  17   1223   NA  139   POTASSIUM  4.1   CHLORIDE  106   VIRI  8.6   CO2  25   BUN  9   CR  0.72   GLC  118*       Assessment:    Tolerating radiation therapy well.  All questions and concerns addressed. Worsening erythema underneath pannus, representing radiation dermatitis vs fungal infection vs combination of both.      Toxicities:  Dermatitis: Grade 2: Moderate to brisk erythema; patchy moist desquamation, mostly confined to skin folds and creases; moderate erythema    Plan:   1. Continue current therapy.    2. Sent form for pre-authorization for Silvrstat. Recommend continuing to keep the skin dry in this area. She was given samples of mepilex to help with friction.       Mosaiq chart and setup information reviewed  MVCT/IGRT images checked    Medication Review  Med list reviewed with patient?: Yes    Educational Topic Discussed  Education Instructions: reviewed        Lazaro Nichols MD  I saw the patient with the resident.  I agree with the resident note and plan of care.      Hailey Ambriz MD

## 2017-08-29 NOTE — LETTER
2017       RE: Mary Anglin  [address]   [place] [address]     Dear Colleague,    Thank you for referring your patient, Mary Anglin, to the RADIATION ONCOLOGY CLINIC. Please see a copy of my visit note below.    [facility]  SPECIALIZING IN BREAKTHROUGHS  Radiation Oncology    On Treatment Visit Note      Mary Anglin      Date: 2017   MRN: [id number]   : 1970  Diagnosis: recurrent endometrial cancer      Reason for Visit:  On Radiation Treatment Visit     Treatment Summary to Date  Treatment Site: pelvis Current Dose: 3960/4500+ cGy Fractions: f       Chemotherapy  Chemo concurrent with radx?: No    Subjective:   Continues to have worsening of skin reaction underneath pannus. She did not get Silvrstat, as her insurance wouldn't cover it. She has been using nystatin cream without any significant improvement. She otherwise is doing well.      Nursing ROS:   Nutrition Alteration  Diet Type: Patient's Preference  Skin  Skin Reaction: 3 - Moist desquamation in areas other than skin folds and creases, bleeding induced by minor trauma or abrasion  Skin Note: nystatin           Gastrointestinal  Nausea: 1 - One to two episodes of nausea/24  Diarrhea: 1 - Abdominal cramping, two or less soft or liquid bowel movements        Pain Assessment  0-10 Pain Scale: 2  Pain Note: groin pain      Objective:   Wt 130.6 kg (288 lb)  BMI 49.44 kg/m2  Gen: Appears well, in no acute distress  Skin: Diffuse erythema of skin underneath pannus. Skin appears moist. Some small areas of moist desquamation near the groin. This could be radiation dermatitis vs fungal infection.     Labs:  CBC RESULTS:   Recent Labs   Lab Test  17   1223   WBC  3.1*   RBC  4.43   HGB  13.9   HCT  41.5   MCV  94   MCH  31.4   MCHC  33.5   RDW  13.1   PLT  115*     ELECTROLYTES:  Recent Labs   Lab Test  17   1223   NA  139   POTASSIUM  4.1   CHLORIDE  106   VIRI  8.6   CO2  25    BUN  9   CR  0.72   GLC  118*       Assessment:    Tolerating radiation therapy well.  All questions and concerns addressed. Worsening erythema underneath pannus, representing radiation dermatitis vs fungal infection vs combination of both.     Toxicities:  Dermatitis: Grade 2: Moderate to brisk erythema; patchy moist desquamation, mostly confined to skin folds and creases; moderate erythema    Plan:   1. Continue current therapy.    2. Sent form for pre-authorization for Silvrstat. Recommend continuing to keep the skin dry in this area. She was given samples of mepilex to help with friction.       Mosaiq chart and setup information reviewed  MVCT/IGRT images checked    Medication Review  Med list reviewed with patient?: Yes    Educational Topic Discussed  Education Instructions: reviewed        Lazaro Nichols MD  I saw the patient with the resident.  I agree with the resident note and plan of care.      Hailey Ambriz MD

## 2017-08-29 NOTE — MR AVS SNAPSHOT
After Visit Summary   8/29/2017    Mary Anglin    MRN: [id number]           Patient Information     Date Of Birth          1970        Visit Information        Provider Department      8/29/2017 8:45 AM Hailey Ambriz MD Radiation Oncology Clinic         Follow-ups after your visit        Your next 10 appointments already scheduled     Aug 30, 2017  8:30 AM CDT   EXTERNAL RADIATION TREATMENT with [facility] RAD ONC RODRI   Radiation Oncology Clinic ([facility])    [facility]  1st Floor  [address] [address]   [phone]            Aug 31, 2017  8:30 AM CDT   EXTERNAL RADIATION TREATMENT with [facility] RAD ONC RODRI   Radiation Oncology Clinic ([facility])    [facility]  1st Floor  [address] [address]   [phone]            Sep 01, 2017  8:30 AM CDT   EXTERNAL RADIATION TREATMENT with [facility] RAD ONC RODRI   Radiation Oncology Clinic ([facility])    [facility]  1st Floor  [address] [address]   [phone]            Sep 01, 2017 10:00 AM CDT   MR PELVIS W/O & W CONTRAST with UUMR2   [facility] [place], MRI ([facility] [place])    [address] [address]   [phone]           Take your medicines as usual, unless your doctor tells you not to. Bring a list of your current medicines to your exam (including vitamins, minerals and over-the-counter drugs).  You will be given intravenous contrast for this exam. To prepare:   The day before your exam, drink extra fluids at least six 8-ounce glasses (unless your doctor tells you to restrict your fluids).   Have a blood test (creatinine test) within 30 days of your exam. Go to your clinic or Diagnostic Imaging Department for this test.  The MRI machine uses a strong magnet. Please wear clothes without metal (snaps,  zippers). A sweatsuit works well, or we may give you a hospital gown.  Please remove any body piercings and hair extensions before you arrive. You will also remove watches, jewelry, hairpins, wallets, dentures, partial dental plates and hearing aids. You may wear contact lenses, and you may be able to wear your rings. We have a safe place to keep your personal items, but it is safer to leave them at home.   **IMPORTANT** THE INSTRUCTIONS BELOW ARE ONLY FOR THOSE PATIENTS WHO HAVE BEEN TOLD THEY WILL RECEIVE SEDATION OR GENERAL ANESTHESIA DURING THEIR MRI PROCEDURE:  IF YOU WILL RECEIVE SEDATION (take medicine to help you relax during your exam):   You must get the medicine from your doctor before you arrive. Bring the medicine to the exam. Do not take it at home.   Arrive one hour early. Bring someone who can take you home after the test. Your medicine will make you sleepy. After the exam, you may not drive, take a bus or take a taxi by yourself.   No eating 8 hours before your exam. You may have clear liquids up until 4 hours before your exam. (Clear liquids include water, clear tea, black coffee and fruit juice without pulp.)  IF YOU WILL RECEIVE ANESTHESIA (be asleep for your exam):   Arrive 1 1/2 hours early. Bring someone who can take you home after the test. You may not drive, take a bus or take a taxi by yourself.   No eating 8 hours before your exam. You may have clear liquids up until 4 hours before your exam. (Clear liquids include water, clear tea, black coffee and fruit juice without pulp.)  Please call the Imaging Department at your exam site with any questions.            Sep 13, 2017  7:25 AM CDT   (Arrive by 5:30 AM)   TCT/SIM Suite Visit with Hailey Ambriz MD   Radiation Oncology Clinic ([facility])    [facility]  1st Floor  [address] [address]   [phone]            Sep 13, 2017   Procedure with Hailey Ambriz MD   [facility],  [place], Same Day Surgery (--)    [address] [address]   [phone]            Sep 13, 2017 11:00 AM CDT   TCT/SIM Suite Visit with Hailey Ambriz MD   Radiation Oncology Clinic ([facility])    [facility]  1st Floor  [address] [address]   [phone]            Sep 13, 2017  3:00 PM CDT   TCT/SIM Suite Visit with Jarrod Kessler MD   Radiation Oncology Clinic ([facility])    [facility]  1st Floor  [address] [address]   [phone]            Sep 14, 2017  8:00 AM CDT   TCT/SIM Suite Visit with Hailey Ambriz MD   Radiation Oncology Clinic ([facility])    [facility]  1st Floor  [address] [address]   [phone]            Sep 14, 2017  2:30 PM CDT   TCT/SIM Suite Visit with Hailey Ambriz MD   Radiation Oncology Clinic ([facility])    [facility]  1st Floor  [address] [address]   [phone]              Who to contact     Please call your clinic at [phone] to:    Ask questions about your health    Make or cancel appointments    Discuss your medicines    Learn about your test results    Speak to your doctor   If you have compliments or concerns about an experience at your clinic, or if you wish to file a complaint, please contact [facility] Patient Relations at [phone] or email us at [email or web address]         Additional Information About Your Visit        Verismo Networkshart Information     Zokem is an electronic gateway that provides easy, online access to your medical records. With Zokem, you can request a clinic appointment, read your test results, renew a prescription or communicate with your care team.     To sign up for AD[employer] visit the website at [email or web address]   You will be asked to  enter the access code listed below, as well as some personal information. Please follow the directions to create your username and password.     Your access code is: [email or web address]  Expires: 2017 10:37 AM     Your access code will  in 90 days. If you need help or a new code, please contact your [facility] or call [phone] for assistance.        Care EveryWhere ID     This is your Care EveryWhere ID. This could be used by other organizations to access your [place] medical records  [id number]         Blood Pressure from Last 3 Encounters:   17 124/82   17 150/88   17 138/81    Weight from Last 3 Encounters:   17 130.5 kg (287 lb 9.6 oz)   08/15/17 132 kg (291 lb)   17 129.5 kg (285 lb 8 oz)              Today, you had the following     No orders found for display       Primary Care Provider Office Phone # Fax #    Mikel Wells [phone]       [address] [address]        Equal Access to Services     HEATHER [facility] SystemLILIA : Hadii aad ku hadasho Soomaali, waaxda luqadaha, qaybta kaalmada adeegyada, loraine cifuentes haykokon natalia turner . So [facility] [phone].    ATENCIÓN: Si habla español, tiene a do disposición servicios gratuitos de asistencia lingüística. Llame al [phone].    We comply with applicable federal civil rights laws and [place] laws. We do not discriminate on the basis of race, color, national origin, age, disability sex, sexual orientation or gender identity.            Thank you!     Thank you for choosing RADIATION ONCOLOGY CLINIC  for your care. Our goal is always to provide you with excellent care. Hearing back from our patients is one way we can continue to improve our services. Please take a few minutes to complete the written survey that you may receive in the mail after your visit with us. Thank you!             Your Updated Medication List - Protect others  around you: Learn how to safely use, store and throw away your medicines at www.disposemymeds.org.          This list is accurate as of: 8/29/17  8:51 AM.  Always use your most recent med list.                   Brand Name Dispense Instructions for use Diagnosis    ACCU-CHEK BRADLEY PLUS test strip   Generic drug:  blood glucose monitoring      1 time per day        albuterol 108 (90 BASE) MCG/ACT Inhaler    PROAIR HFA/PROVENTIL HFA/VENTOLIN HFA     Inhale 2 puffs into the lungs every 4 hours as needed        ANTI-DIARRHEAL 2 MG capsule   Generic drug:  loperamide      Take 2 mg by mouth as needed for diarrhea        atorvastatin 10 MG tablet    LIPITOR     Take 0.5 tablets (5 mg total) by mouth every night at bedtime.        blood glucose monitoring meter device kit      1 Device        clobetasol 0.05 % cream    TEMOVATE     apply topically PRN        clotrimazole 1 % cream    LOTRIMIN    40 g    Apply topically 2 times daily    Fungal infection of skin, Endometrial cancer (H)       docusate sodium 100 MG capsule    COLACE     Take 100 mg by mouth 2 times daily as needed        doxepin 10 MG capsule    SINEquan     Take 20 mg by mouth At Bedtime        escitalopram 20 MG tablet    LEXAPRO     Take 20 mg by mouth At Bedtime        furosemide 40 MG tablet    LASIX     Take 40 mg by mouth 2 times daily        Lancet Device Misc      As Directed        Melatonin 5 MG Tbdp      Take 2.5-5 mg by mouth nightly as needed        metFORMIN 500 MG tablet    GLUCOPHAGE     Take 500 mg by mouth daily (with dinner)        mupirocin 2 % ointment    BACTROBAN     Apply to affected area 2 times a day as needed for other (Specify) (rash on back of neck)        nystatin cream    MYCOSTATIN    90 g    Apply topically 3 times daily for 14 days    Fungal infection       SILVRSTAT WOUND DRESSING Gel     85.05 g    Externally apply topically 2 times daily    Skin desquamation

## 2017-08-30 ENCOUNTER — APPOINTMENT (OUTPATIENT)
Dept: RADIATION ONCOLOGY | Facility: CLINIC | Age: 47
End: 2017-08-30
Attending: RADIOLOGY
Payer: MEDICAID

## 2017-08-30 PROCEDURE — 77386 ZZH IMRT TREATMENT DELIVERY, COMPLEX: CPT | Performed by: RADIOLOGY

## 2017-08-30 NOTE — PROGRESS NOTES
[facility]YS[facility]ANS  SPECIALIZING IN BREAKTHROUGHS  Radiation Oncology    On Treatment Visit Note      Mary Anglin      Date: 2017   MRN: [id number]   : 1970  Diagnosis: recurrent endometrial cancer      Reason for Visit:  On Radiation Treatment Visit     Treatment Summary to Date  Treatment Site: pelvis Current Dose: 3240/4500+ cGy Fractions: 18/25fx       Chemotherapy  Chemo concurrent with radx?: No    Subjective:   Ms. Anglin reports increased discomfort in the skin fold under her pannus.  She states that when her skin gets like this, use of antifungal cream has helped in the past.  Her other symptoms including nausea and diarrhea are well controlled.     Nursing ROS:      Skin  Skin Reaction: 0 - No changes           Gastrointestinal  Nausea: 1 - One to two episodes of nausea/24  Diarrhea: 1 - Abdominal cramping, two or less soft or liquid bowel movements        Pain Assessment  0-10 Pain Scale: 0      Objective:   There were no vitals taken for this visit.  Gen: Appears well, in no acute distress  Skin: Lifting the pannus reveals diffuse erythema and moist desquamation with a strong odor    Labs:  CBC RESULTS:   Recent Labs   Lab Test  17   1223   WBC  3.1*   RBC  4.43   HGB  13.9   HCT  41.5   MCV  94   MCH  31.4   MCHC  33.5   RDW  13.1   PLT  115*     ELECTROLYTES:  Recent Labs   Lab Test  17   1223   NA  139   POTASSIUM  4.1   CHLORIDE  106   VIRI  8.6   CO2  25   BUN  9   CR  0.72   GLC  118*       Assessment:    Tolerating radiation therapy well.  All questions and concerns addressed.    Toxicities:  Nausea: Grade 1: Loss of appetite without alteration in eating habits  Diarrhea: Grade 1: Increase of <4 stools per day over baseline; mild increase in ostomy output compared to baseline  Dermatitis: Grade 2: Moderate to brisk erythema; patchy moist desquamation, mostly confined to skin folds and creases; moderate erythema    Plan:   1. Continue current  therapy.    2. Prescribed SilvrSTAT.  She will also continue to use anti-fungal cream.    3. Also advised her to keep the skin dry if possible.      Taglocityiq chart and setup information reviewed  MVCT/IGRT images checked                  Jarrdo Kessler MD/PhD  [phone] clinic  Pager [phone]    Please do not send letter to referring physician.

## 2017-08-31 ENCOUNTER — APPOINTMENT (OUTPATIENT)
Dept: RADIATION ONCOLOGY | Facility: CLINIC | Age: 47
End: 2017-08-31
Attending: RADIOLOGY
Payer: MEDICAID

## 2017-08-31 PROCEDURE — 77386 ZZH IMRT TREATMENT DELIVERY, COMPLEX: CPT | Performed by: RADIOLOGY

## 2017-09-01 ENCOUNTER — APPOINTMENT (OUTPATIENT)
Dept: RADIATION ONCOLOGY | Facility: CLINIC | Age: 47
End: 2017-09-01
Attending: RADIOLOGY
Payer: MEDICAID

## 2017-09-01 PROCEDURE — 77386 ZZH IMRT TREATMENT DELIVERY, COMPLEX: CPT | Performed by: RADIOLOGY

## 2017-09-01 PROCEDURE — 77336 RADIATION PHYSICS CONSULT: CPT | Performed by: RADIOLOGY

## 2017-09-13 ENCOUNTER — TELEPHONE (OUTPATIENT)
Dept: RADIATION ONCOLOGY | Facility: CLINIC | Age: 47
End: 2017-09-13

## 2017-09-13 NOTE — TELEPHONE ENCOUNTER
RN called pt and gave her, her appointment dates/times. RN reviewed the procedure and answered all questions.  Pt will be staying with her sister so no [place] referral needed. Pt will call is questions arise before Friday.

## 2017-09-13 NOTE — TELEPHONE ENCOUNTER
Called Mary with dates and times of brachytherapy procedures.  Briefly reviewed procedure.  She has no questions at this time.

## 2017-09-15 ENCOUNTER — ALLIED HEALTH/NURSE VISIT (OUTPATIENT)
Dept: RADIATION ONCOLOGY | Facility: CLINIC | Age: 47
End: 2017-09-15
Attending: RADIOLOGY
Payer: MEDICAID

## 2017-09-15 VITALS — HEART RATE: 75 BPM | SYSTOLIC BLOOD PRESSURE: 145 MMHG | OXYGEN SATURATION: 94 % | DIASTOLIC BLOOD PRESSURE: 80 MMHG

## 2017-09-15 DIAGNOSIS — C54.1 ENDOMETRIAL CANCER (H): Primary | ICD-10-CM

## 2017-09-15 PROCEDURE — 77332 RADIATION TREATMENT AID(S): CPT | Performed by: RADIOLOGY

## 2017-09-15 PROCEDURE — 25000132 ZZH RX MED GY IP 250 OP 250 PS 637: Mod: ZF | Performed by: RADIOLOGY

## 2017-09-15 PROCEDURE — 77295 3-D RADIOTHERAPY PLAN: CPT | Performed by: RADIOLOGY

## 2017-09-15 PROCEDURE — 25000125 ZZHC RX 250: Mod: ZF | Performed by: RADIOLOGY

## 2017-09-15 PROCEDURE — 27110014 ZZH IMPLANT RADIATION BRIEF: Performed by: RADIOLOGY

## 2017-09-15 PROCEDURE — C1717 BRACHYTX, NON-STR,HDR IR-192: HCPCS | Performed by: RADIOLOGY

## 2017-09-15 PROCEDURE — 77770 HDR RDNCL NTRSTL/ICAV BRCHTX: CPT | Performed by: RADIOLOGY

## 2017-09-15 PROCEDURE — 57156 INS VAG BRACHYTX DEVICE: CPT | Performed by: RADIOLOGY

## 2017-09-15 RX ORDER — OXYCODONE HYDROCHLORIDE 5 MG/1
5 TABLET ORAL ONCE
Status: COMPLETED | OUTPATIENT
Start: 2017-09-15 | End: 2017-09-15

## 2017-09-15 RX ADMIN — OXYCODONE HYDROCHLORIDE 5 MG: 5 TABLET ORAL at 08:45

## 2017-09-15 RX ADMIN — LIDOCAINE HYDROCHLORIDE 1 TUBE: 20 JELLY TOPICAL at 08:28

## 2017-09-15 NOTE — MR AVS SNAPSHOT
After Visit Summary   9/15/2017    Mary Anglin    MRN: [id number]           Patient Information     Date Of Birth          1970        Visit Information        Provider Department      9/15/2017 8:15 AM Jarrod Kessler MD Radiation Oncology Clinic        Today's Diagnoses     Endometrial cancer (H)    -  1      Care Instructions        You will be having a type of treatment called High Dose Radiation (HDR) therapy.  You will have this near the end of your course of radiation therapy.      Preparing for additional HDR treatments:     1. Please bring your special underwear to these visits.  2. You may eat a light breakfast and take your medications.  3. The radiation therapist will bring you to the CT scanner room, where the doctor will insert the cylinder into your vagina.  4. You will have a CT scan to check the placement of the cylinder.  5. We will bring you to the treatment room to have your treatment.  6. After the treatment has been completed the nurse will remove the cylinder.  7. You will receive instructions for home and your next visit.   When to call your doctor:   1. Some minor bleeding /spotting is normal after this procedure. Call if you have bright red vaginal bleeding.    2. If vaginal discharge has a bad odor.  3. If you have a fever over 100.5 F.  4. If you have urinary burning that does not go away after a few days.  Some minor burning is normal for a day or two.      [facility] Radiation Oncology: [phone]            Follow-ups after your visit        Your next 10 appointments already scheduled     Sep 18, 2017  9:15 AM CDT   TCT/SIM Suite Visit with Hailey Ambriz MD   Radiation Oncology Clinic ([facility])    [facility]  [address] [address]   [phone]            Sep 20, 2017  2:15 PM CDT   TCT/SIM Suite Visit with Jarrod Kessler MD   Radiation Oncology Clinic  ([facility])    [facility]  1st Floor  [address] [address]   [phone]            Sep 22, 2017  9:45 AM CDT   TCT/SIM Suite Visit with Jarrod Kessler MD   Radiation Oncology Clinic ([facility])    [facility]  1st Floor  [address] [address]   [phone]            Sep 25, 2017  8:45 AM CDT   TCT/SIM Suite Visit with Jarrod Kessler MD   Radiation Oncology Clinic ([facility])    [facility]  1st Floor  [address] [address]   [phone]              Who to contact     Please call your clinic at [phone] to:    Ask questions about your health    Make or cancel appointments    Discuss your medicines    Learn about your test results    Speak to your doctor   If you have compliments or concerns about an experience at your clinic, or if you wish to file a complaint, please contact [facility] Patient Relations at [phone] or email us at [email or web address]         Additional Information About Your Visit        Analyte Logichart Information     Frest Marketingt is an electronic gateway that provides easy, online access to your medical records. With [employer], you can request a clinic appointment, read your test results, renew a prescription or communicate with your care team.     To sign up for [employer] visit the website at [email or web address]   You will be asked to enter the access code listed below, as well as some personal information. Please follow the directions to create your username and password.     Your access code is: [email or web address]  Expires: 2017 11:43 AM     Your access code will  in 90 days. If you need help or a new code, please contact your [facility] or call [phone] for assistance.        Care EveryWhere ID     This is your Care EveryWhere ID. This could  be used by other organizations to access your [place] medical records  [id number]        Your Vitals Were     Pulse Pulse Oximetry                75 94%           Blood Pressure from Last 3 Encounters:   09/15/17 145/80   08/21/17 124/82   07/21/17 150/88    Weight from Last 3 Encounters:   08/29/17 130.6 kg (288 lb)   08/21/17 130.5 kg (287 lb 9.6 oz)   08/15/17 132 kg (291 lb)              Today, you had the following     No orders found for display       Primary Care Provider Office Phone # Fax #    Mikel Wells [phone]       [facility] [address] [address]        Equal Access to Services     ALEX ROSAS : Hadii peggy vazquezo Sourban, waaxda luqadaha, qaybta kaalmada adeegyada, loraine turner . So [facility] [phone].    ATENCIÓN: Si habla español, tiene a do disposición servicios gratuitos de asistencia lingüística. [facility] [phone].    We comply with applicable federal civil rights laws and [place] laws. We do not discriminate on the basis of race, color, national origin, age, disability sex, sexual orientation or gender identity.            Thank you!     Thank you for choosing RADIATION ONCOLOGY CLINIC  for your care. Our goal is always to provide you with excellent care. Hearing back from our patients is one way we can continue to improve our services. Please take a few minutes to complete the written survey that you may receive in the mail after your visit with us. Thank you!             Your Updated Medication List - Protect others around you: Learn how to safely use, store and throw away your medicines at www.disposemymeds.org.          This list is accurate as of: 9/15/17 11:43 AM.  Always use your most recent med list.                   Brand Name Dispense Instructions for use Diagnosis    ACCU-CHEK BRADLEY PLUS test strip   Generic drug:  blood glucose monitoring      1 time per day        albuterol 108 (90  BASE) MCG/ACT Inhaler    PROAIR HFA/PROVENTIL HFA/VENTOLIN HFA     Inhale 2 puffs into the lungs every 4 hours as needed        ANTI-DIARRHEAL 2 MG capsule   Generic drug:  loperamide      Take 2 mg by mouth as needed for diarrhea        atorvastatin 10 MG tablet    LIPITOR     Take 0.5 tablets (5 mg total) by mouth every night at bedtime.        blood glucose monitoring meter device kit      1 Device        clobetasol 0.05 % cream    TEMOVATE     apply topically PRN        clotrimazole 1 % cream    LOTRIMIN    40 g    Apply topically 2 times daily    Fungal infection of skin, Endometrial cancer (H)       docusate sodium 100 MG capsule    COLACE     Take 100 mg by mouth 2 times daily as needed        doxepin 10 MG capsule    SINEquan     Take 20 mg by mouth At Bedtime        escitalopram 20 MG tablet    LEXAPRO     Take 20 mg by mouth At Bedtime        furosemide 40 MG tablet    LASIX     Take 40 mg by mouth 2 times daily        Lancet Device Misc      As Directed        Melatonin 5 MG Tbdp      Take 2.5-5 mg by mouth nightly as needed        metFORMIN 500 MG tablet    GLUCOPHAGE     Take 500 mg by mouth daily (with dinner)        mupirocin 2 % ointment    BACTROBAN     Apply to affected area 2 times a day as needed for other (Specify) (rash on back of neck)        SILVRSTAT WOUND DRESSING Gel     85.05 g    Externally apply topically 2 times daily    Skin desquamation

## 2017-09-15 NOTE — PATIENT INSTRUCTIONS
You will be having a type of treatment called High Dose Radiation (HDR) therapy.  You will have this near the end of your course of radiation therapy.      Preparing for additional HDR treatments:     1. Please bring your special underwear to these visits.  2. You may eat a light breakfast and take your medications.  3. The radiation therapist will bring you to the CT scanner room, where the doctor will insert the cylinder into your vagina.  4. You will have a CT scan to check the placement of the cylinder.  5. We will bring you to the treatment room to have your treatment.  6. After the treatment has been completed the nurse will remove the cylinder.  7. You will receive instructions for home and your next visit.   When to call your doctor:   1. Some minor bleeding /spotting is normal after this procedure. Call if you have bright red vaginal bleeding.    2. If vaginal discharge has a bad odor.  3. If you have a fever over 100.5 F.  4. If you have urinary burning that does not go away after a few days.  Some minor burning is normal for a day or two.      [facility] Radiation Oncology: [phone]

## 2017-09-15 NOTE — PROGRESS NOTES
Mary Anglin is here for scheduled HDR brachytherapy    HDR Multi Channel Cylinder   1    Pre-procedure-  Patient identified, arm band applied, signed consent available   Medications taken by patient prior to procedure: Oxycodone 5 mg tab. Pt states she has pain close to 10/10 with pelvic exams.  Pain assessment: Pt tolerated the insertion with the medication on board  /80  Pulse 75  SpO2 94%    Waiting  Pt resting quietly in room. Call light in reach. TV control available, bed low and lock. Call light in reach.    Post-procedure-  Pain assessment: 0/10   Device removed without difficulty, Discharge teaching reviewed, questions answered, Vaginal dilator use reviewed and Discharged to home, Pt sister will be picking her up.    RN spoke with Pt and discussed the addition of a 5th treatment. Pt was given the date and time. All questions answered at this time.

## 2017-09-18 ENCOUNTER — ALLIED HEALTH/NURSE VISIT (OUTPATIENT)
Dept: RADIATION ONCOLOGY | Facility: CLINIC | Age: 47
End: 2017-09-18
Attending: RADIOLOGY
Payer: MEDICAID

## 2017-09-18 VITALS — SYSTOLIC BLOOD PRESSURE: 154 MMHG | DIASTOLIC BLOOD PRESSURE: 87 MMHG | HEART RATE: 74 BPM | OXYGEN SATURATION: 95 %

## 2017-09-18 DIAGNOSIS — C54.1 ENDOMETRIAL CANCER (H): Primary | ICD-10-CM

## 2017-09-18 PROCEDURE — 77332 RADIATION TREATMENT AID(S): CPT | Performed by: RADIOLOGY

## 2017-09-18 PROCEDURE — C1717 BRACHYTX, NON-STR,HDR IR-192: HCPCS | Performed by: RADIOLOGY

## 2017-09-18 PROCEDURE — 77470 SPECIAL RADIATION TREATMENT: CPT | Performed by: RADIOLOGY

## 2017-09-18 PROCEDURE — 77280 THER RAD SIMULAJ FIELD SMPL: CPT | Performed by: RADIOLOGY

## 2017-09-18 PROCEDURE — 77770 HDR RDNCL NTRSTL/ICAV BRCHTX: CPT | Performed by: RADIOLOGY

## 2017-09-18 PROCEDURE — 57156 INS VAG BRACHYTX DEVICE: CPT | Performed by: RADIOLOGY

## 2017-09-18 PROCEDURE — 25000125 ZZHC RX 250: Mod: ZF | Performed by: RADIOLOGY

## 2017-09-18 RX ADMIN — LIDOCAINE HYDROCHLORIDE 2 TUBE: 20 JELLY TOPICAL at 09:23

## 2017-09-18 NOTE — PROGRESS NOTES
HDR  #2 of 5 done under my direction.    Labs from [facility] reviewed from  9/11/17  Recommend she increase the imodium from 1-2 /day to 6-8       2.5cm diameter cylinder used.  Tolerated well.      Hailey Ambriz  [phone] pager

## 2017-09-18 NOTE — MR AVS SNAPSHOT
After Visit Summary   9/18/2017    Mary Anglin    MRN: [id number]           Patient Information     Date Of Birth          1970        Visit Information        Provider Department      9/18/2017 9:15 AM Hailey Ambriz MD Radiation Oncology Clinic        Today's Diagnoses     Endometrial cancer (H)    -  1      Care Instructions        You will be having a type of treatment called High Dose Radiation (HDR) therapy.  You will have this near the end of your course of radiation therapy.      Preparing for additional HDR treatments:     1. Please bring your special underwear to these visits.  2. You may eat a light breakfast and take your medications.  3. The radiation therapist will bring you to the CT scanner room, where the doctor will insert the cylinder into your vagina.  4. You will have a CT scan to check the placement of the cylinder.  5. We will bring you to the treatment room to have your treatment.  6. After the treatment has been completed the nurse will remove the cylinder.  7. You will receive instructions for home and your next visit.   When to call your doctor:   1. Some minor bleeding /spotting is normal after this procedure. Call if you have bright red vaginal bleeding.    2. If vaginal discharge has a bad odor.  3. If you have a fever over 100.5 F.  4. If you have urinary burning that does not go away after a few days.  Some minor burning is normal for a day or two.      [facility] Radiation Oncology: [phone]            Follow-ups after your visit        Your next 10 appointments already scheduled     Sep 20, 2017  2:15 PM CDT   TCT/SIM Suite Visit with Jarrod Kessler MD   Radiation Oncology Clinic ([facility])    [facility]  1st Floor  [address] [address]   [phone]            Sep 22, 2017  9:45 AM CDT   TCT/SIM Suite Visit with Jarrod Kessler MD   Radiation Oncology Clinic  ([facility])    [facility]  1st Floor  [address] [address] [phone]            Sep 25, 2017  8:45 AM CDT   TCT/SIM Suite Visit with Jarrod Kessler MD   Radiation Oncology Clinic ([facility])    [facility]  1st Floor  [address] [address] [phone]              Who to contact     Please call your clinic at [phone] to:    Ask questions about your health    Make or cancel appointments    Discuss your medicines    Learn about your test results    Speak to your doctor   If you have compliments or concerns about an experience at your clinic, or if you wish to file a complaint, please contact [facility]icians Patient Relations at [phone] or email us at [email or web address]         Additional Information About Your Visit        ProgressusharImindi Information     Picatic is an electronic gateway that provides easy, online access to your medical records. With Picatic, you can request a clinic appointment, read your test results, renew a prescription or communicate with your care team.     To sign up for Picatic visit the website at [email or web address]   You will be asked to enter the access code listed below, as well as some personal information. Please follow the directions to create your username and password.     Your access code is: [email or web address]  Expires: 2017 11:43 AM     Your access code will  in 90 days. If you need help or a new code, please contact your [facility] Clinic or call [phone] for assistance.        Care EveryWhere ID     This is your Care EveryWhere ID. This could be used by other organizations to access your [place] medical records  [id number]        Your Vitals Were     Pulse Pulse Oximetry                74 95%           Blood Pressure from Last 3 Encounters:   17 154/87   09/15/17 145/80    08/21/17 124/82    Weight from Last 3 Encounters:   08/29/17 130.6 kg (288 lb)   08/21/17 130.5 kg (287 lb 9.6 oz)   08/15/17 132 kg (291 lb)              Today, you had the following     No orders found for display       Primary Care Provider Office Phone # Fax #    Mikel Wells [phone]       [address] [address]        Equal Access to Services     ALEX ROSAS : Hadii aad ku hadasho Soomaali, waaxda luqadaha, qaybta kaalmada adeegyada, waxay nohemyin haykokon natalia turner . So [facility] [phone].    ATENCIÓN: Si habla español, tiene a do disposición servicios gratuitos de asistencia lingüística. [facility] [phone].    We comply with applicable federal civil rights laws and [place] laws. We do not discriminate on the basis of race, color, national origin, age, disability sex, sexual orientation or gender identity.            Thank you!     Thank you for choosing RAD[facility]ATION ONCOLOGY CLINIC  for your care. Our goal is always to provide you with excellent care. Hearing back from our patients is one way we can continue to improve our services. Please take a few minutes to complete the written survey that you may receive in the mail after your visit with us. Thank you!             Your Updated Medication List - Protect others around you: Learn how to safely use, store and throw away your medicines at www.disposemymeds.org.          This list is accurate as of: 9/18/17  9:48 AM.  Always use your most recent med list.                   Brand Name Dispense Instructions for use Diagnosis    ACCU-CHEK BRADLEY PLUS test strip   Generic drug:  blood glucose monitoring      1 time per day        albuterol 108 (90 BASE) MCG/ACT Inhaler    PROAIR HFA/PROVENTIL HFA/VENTOLIN HFA     Inhale 2 puffs into the lungs every 4 hours as needed        ANTI-DIARRHEAL 2 MG capsule   Generic drug:  loperamide      Take 2 mg by mouth as needed for diarrhea         atorvastatin 10 MG tablet    LIPITOR     Take 0.5 tablets (5 mg total) by mouth every night at bedtime.        blood glucose monitoring meter device kit      1 Device        clobetasol 0.05 % cream    TEMOVATE     apply topically PRN        clotrimazole 1 % cream    LOTRIMIN    40 g    Apply topically 2 times daily    Fungal infection of skin, Endometrial cancer (H)       docusate sodium 100 MG capsule    COLACE     Take 100 mg by mouth 2 times daily as needed        doxepin 10 MG capsule    SINEquan     Take 20 mg by mouth At Bedtime        escitalopram 20 MG tablet    LEXAPRO     Take 20 mg by mouth At Bedtime        furosemide 40 MG tablet    LASIX     Take 40 mg by mouth 2 times daily        Lancet Device Misc      As Directed        Melatonin 5 MG Tbdp      Take 2.5-5 mg by mouth nightly as needed        metFORMIN 500 MG tablet    GLUCOPHAGE     Take 500 mg by mouth daily (with dinner)        mupirocin 2 % ointment    BACTROBAN     Apply to affected area 2 times a day as needed for other (Specify) (rash on back of neck)        SILVRSTAT WOUND DRESSING Gel     85.05 g    Externally apply topically 2 times daily    Skin desquamation

## 2017-09-18 NOTE — PROGRESS NOTES
Mary Anglin is here for scheduled HDR brachytherapy    HDR Multi Channel Cylinder   2    Pre-procedure-  Patient identified, arm band applied, signed consent available   Medications taken by patient prior to procedure : none  Pain assessment: Lido x2 used for insertion. Pt states she tolerated it well. She does not feel the need for a oxycodone prior to each insertion.   /87  Pulse 74  SpO2 95%    Post-procedure-  Pain assessment: 0/10    Device removed without difficulty, Discharge teaching reviewed, questions answered and Discharged to home. RN spoke to pt about diet and imodium use. RN took off pt drsg on her Rt leg. SOre healing well. Pt took picture on her phone and will touch base with her home clinic. Dr. Malcolm saw the sore as well. No drsg reapplied, open to air.

## 2017-09-18 NOTE — PATIENT INSTRUCTIONS
You will be having a type of treatment called High Dose Radiation (HDR) therapy.  You will have this near the end of your course of radiation therapy.      Preparing for additional HDR treatments:     1. Please bring your special underwear to these visits.  2. You may eat a light breakfast and take your medications.  3. The radiation therapist will bring you to the CT scanner room, where the doctor will insert the cylinder into your vagina.  4. You will have a CT scan to check the placement of the cylinder.  5. We will bring you to the treatment room to have your treatment.  6. After the treatment has been completed the nurse will remove the cylinder.  7. You will receive instructions for home and your next visit.   When to call your doctor:   1. Some minor bleeding /spotting is normal after this procedure. Call if you have bright red vaginal bleeding.    2. If vaginal discharge has a bad odor.  3. If you have a fever over 100.5 F.  4. If you have urinary burning that does not go away after a few days.  Some minor burning is normal for a day or two.      [facility] Radiation Oncology: [phone]

## 2017-09-20 ENCOUNTER — ALLIED HEALTH/NURSE VISIT (OUTPATIENT)
Dept: RADIATION ONCOLOGY | Facility: CLINIC | Age: 47
End: 2017-09-20
Attending: RADIOLOGY
Payer: MEDICAID

## 2017-09-20 VITALS — DIASTOLIC BLOOD PRESSURE: 68 MMHG | SYSTOLIC BLOOD PRESSURE: 175 MMHG | WEIGHT: 287 LBS | BODY MASS INDEX: 49.26 KG/M2

## 2017-09-20 DIAGNOSIS — C54.1 ENDOMETRIAL CANCER (H): Primary | ICD-10-CM

## 2017-09-20 PROCEDURE — 77771 HDR RDNCL NTRSTL/ICAV BRCHTX: CPT | Performed by: RADIOLOGY

## 2017-09-20 PROCEDURE — 77332 RADIATION TREATMENT AID(S): CPT | Performed by: RADIOLOGY

## 2017-09-20 PROCEDURE — C1717 BRACHYTX, NON-STR,HDR IR-192: HCPCS | Performed by: RADIOLOGY

## 2017-09-20 PROCEDURE — 77280 THER RAD SIMULAJ FIELD SMPL: CPT | Performed by: RADIOLOGY

## 2017-09-20 PROCEDURE — 77770 HDR RDNCL NTRSTL/ICAV BRCHTX: CPT | Performed by: RADIOLOGY

## 2017-09-20 PROCEDURE — 57156 INS VAG BRACHYTX DEVICE: CPT | Performed by: RADIOLOGY

## 2017-09-20 NOTE — MR AVS SNAPSHOT
After Visit Summary   9/20/2017    Mary Anglin    MRN: [id number]           Patient Information     Date Of Birth          1970        Visit Information        Provider Department      9/20/2017 2:15 PM Jarrod Kessler MD Radiation Oncology Clinic        Today's Diagnoses     Endometrial cancer (H)    -  1       Follow-ups after your visit        Your next 10 appointments already scheduled     Sep 22, 2017  9:45 AM CDT   TCT/SIM Suite Visit with Jarrod Kessler MD   Radiation Oncology Clinic ([facility])    [facility]  1st Floor  [address] [address] [phone]            Sep 25, 2017  8:45 AM CDT   TCT/SIM Suite Visit with Jarrod Kessler MD   Radiation Oncology Clinic ([facility])    [facility]  1st Floor  [address] [address] [phone]              Who to contact     Please call your clinic at [phone] to:    Ask questions about your health    Make or cancel appointments    Discuss your medicines    Learn about your test results    Speak to your doctor   If you have compliments or concerns about an experience at your clinic, or if you wish to file a complaint, please contact [facility] Patient Relations at [phone] or email us at [email or web address]         Additional Information About Your Visit        MyChart Information     AppointmentCity is an electronic gateway that provides easy, online access to your medical records. With AppointmentCity, you can request a clinic appointment, read your test results, renew a prescription or communicate with your care team.     To sign up for Bell Boardzt visit the website at [email or web address]   You will be asked to enter the access code listed below, as well as some personal information. Please follow the directions to create your username and password.     Your access code is:  [email or web address]  Expires: 2017 11:43 AM     Your access code will  in 90 days. If you need help or a new code, please contact your [facility] Clinic or call [phone] for assistance.        Care EveryWhere ID     This is your Care EveryWhere ID. This could be used by other organizations to access your [place] medical records  [id number]        Your Vitals Were     BMI (Body Mass Index)                   49.26 kg/m2            Blood Pressure from Last 3 Encounters:   17 175/68   17 154/87   09/15/17 145/80    Weight from Last 3 Encounters:   17 130.2 kg (287 lb)   17 130.6 kg (288 lb)   17 130.5 kg (287 lb 9.6 oz)              Today, you had the following     No orders found for display       Primary Care Provider Office Phone # Fax #    Mikel Wells [phone]       [place] [address]        Equal Access to Services     HEATHER [facility] CenterLILIA : Hadii aad ku hadasho Soomaali, waaxda luqadaha, qaybta kaalmada ademackenzie, loraine turner . So [facility] [phone].    ATENCIÓN: Si habla español, tiene a do disposición servicios gratuitos de asistencia lingüística. [facility] [phone].    We comply with applicable federal civil rights laws and [place] laws. We do not discriminate on the basis of race, color, national origin, age, disability sex, sexual orientation or gender identity.            Thank you!     Thank you for choosing [facility] CL[facility]  for your care. Our goal is always to provide you with excellent care. Hearing back from our patients is one way we can continue to improve our services. Please take a few minutes to complete the written survey that you may receive in the mail after your visit with us. Thank you!             Your Updated Medication List - Protect others around you: Learn how to safely use, store and throw away your medicines at  www.disposemymeds.org.          This list is accurate as of: 9/20/17  3:29 PM.  Always use your most recent med list.                   Brand Name Dispense Instructions for use Diagnosis    ACCU-CHEK BRADLEY PLUS test strip   Generic drug:  blood glucose monitoring      1 time per day        albuterol 108 (90 BASE) MCG/ACT Inhaler    PROAIR HFA/PROVENTIL HFA/VENTOLIN HFA     Inhale 2 puffs into the lungs every 4 hours as needed        ANTI-DIARRHEAL 2 MG capsule   Generic drug:  loperamide      Take 2 mg by mouth as needed for diarrhea        atorvastatin 10 MG tablet    LIPITOR     Take 0.5 tablets (5 mg total) by mouth every night at bedtime.        blood glucose monitoring meter device kit      1 Device        clobetasol 0.05 % cream    TEMOVATE     apply topically PRN        clotrimazole 1 % cream    LOTRIMIN    40 g    Apply topically 2 times daily    Fungal infection of skin, Endometrial cancer (H)       docusate sodium 100 MG capsule    COLACE     Take 100 mg by mouth 2 times daily as needed        doxepin 10 MG capsule    SINEquan     Take 20 mg by mouth At Bedtime        escitalopram 20 MG tablet    LEXAPRO     Take 20 mg by mouth At Bedtime        furosemide 40 MG tablet    LASIX     Take 40 mg by mouth 2 times daily        Lancet Device Misc      As Directed        Melatonin 5 MG Tbdp      Take 2.5-5 mg by mouth nightly as needed        metFORMIN 500 MG tablet    GLUCOPHAGE     Take 500 mg by mouth daily (with dinner)        mupirocin 2 % ointment    BACTROBAN     Apply to affected area 2 times a day as needed for other (Specify) (rash on back of neck)        SILVRSTAT WOUND DRESSING Gel     85.05 g    Externally apply topically 2 times daily    Skin desquamation

## 2017-09-20 NOTE — PROGRESS NOTES
Mary Anglin is here for scheduled HDR brachytherapy    HDR Multi Channel Cylinder   3    Pre-procedure-  Time out done by Elyse Hernandez RN and Dr Kessler.   Patient identified, arm band applied, signed consent available   Medications taken by patient prior to procedure None  Pain assessment: Pt denies any pain  /68  Wt 130.2 kg (287 lb)  BMI 49.26 kg/m2    Post-procedure-  Pain assessment: 0   Device removed without difficulty, Discharge teaching reviewed, questions answered and Discharged to home

## 2017-09-22 ENCOUNTER — ALLIED HEALTH/NURSE VISIT (OUTPATIENT)
Dept: RADIATION ONCOLOGY | Facility: CLINIC | Age: 47
End: 2017-09-22
Attending: RADIOLOGY
Payer: MEDICAID

## 2017-09-22 VITALS — WEIGHT: 286 LBS | SYSTOLIC BLOOD PRESSURE: 168 MMHG | DIASTOLIC BLOOD PRESSURE: 79 MMHG | BODY MASS INDEX: 49.09 KG/M2

## 2017-09-22 DIAGNOSIS — C54.1 ENDOMETRIAL CANCER (H): Primary | ICD-10-CM

## 2017-09-22 PROCEDURE — 57156 INS VAG BRACHYTX DEVICE: CPT | Performed by: RADIOLOGY

## 2017-09-22 PROCEDURE — 77771 HDR RDNCL NTRSTL/ICAV BRCHTX: CPT | Performed by: RADIOLOGY

## 2017-09-22 PROCEDURE — C1717 BRACHYTX, NON-STR,HDR IR-192: HCPCS

## 2017-09-22 PROCEDURE — 77280 THER RAD SIMULAJ FIELD SMPL: CPT | Performed by: RADIOLOGY

## 2017-09-22 NOTE — MR AVS SNAPSHOT
After Visit Summary   2017    Mary Anglin    MRN: [id number]           Patient Information     Date Of Birth          1970        Visit Information        Provider Department      2017 9:45 AM Jarrod Kessler MD Radiation Oncology Clinic        Today's Diagnoses     Endometrial cancer (H)    -  1       Follow-ups after your visit        Your next 10 appointments already scheduled     Sep 25, 2017  8:45 AM CDT   TCT/SIM Suite Visit with Jarrod Kessler MD   Radiation Oncology Clinic ([facility])    [facility]  1st Floor  [address] [address] [phone]              Who to contact     Please call your clinic at [phone] to:    Ask questions about your health    Make or cancel appointments    Discuss your medicines    Learn about your test results    Speak to your doctor   If you have compliments or concerns about an experience at your clinic, or if you wish to file a complaint, please contact [facility] Physicians Patient Relations at [phone] or email us at [email or web address]         Additional Information About Your Visit        MyChart Information     Flypeeps is an electronic gateway that provides easy, online access to your medical records. With Flypeeps, you can request a clinic appointment, read your test results, renew a prescription or communicate with your care team.     To sign up for Flypeeps visit the website at [email or web address]   You will be asked to enter the access code listed below, as well as some personal information. Please follow the directions to create your username and password.     Your access code is: [email or web address]  Expires: 2017 11:43 AM     Your access code will  in 90 days. If you need help or a new code, please contact your [facility] Clinic or call [phone] for assistance.        Care EveryWhere ID     This is  your Care EveryWhere ID. This could be used by other organizations to access your [place] medical records  [id number]        Your Vitals Were     BMI (Body Mass Index)                   49.09 kg/m2            Blood Pressure from Last 3 Encounters:   09/22/17 168/79   09/20/17 175/68   09/18/17 154/87    Weight from Last 3 Encounters:   09/22/17 129.7 kg (286 lb)   09/20/17 130.2 kg (287 lb)   08/29/17 130.6 kg (288 lb)              Today, you had the following     No orders found for display       Primary Care Provider Office Phone # Fax #    Mikel Wells [phone]       [facility] [address] [address]        Equal Access to Services     ALEX ROSAS : Hadii peggy vazquezo Sourban, waaxda luqadaha, qaybta kaalmada adeegyada, loraine turner . So [facility] [phone].    ATENCIÓN: Si habla español, tiene a do disposición servicios gratuitos de asistencia lingüística. Llame al [phone].    We comply with applicable federal civil rights laws and [place] laws. We do not discriminate on the basis of race, color, national origin, age, disability sex, sexual orientation or gender identity.            Thank you!     Thank you for choosing [facility] ONCOL[facility]Y CLINIC  for your care. Our goal is always to provide you with excellent care. Hearing back from our patients is one way we can continue to improve our services. Please take a few minutes to complete the written survey that you may receive in the mail after your visit with us. Thank you!             Your Updated Medication List - Protect others around you: Learn how to safely use, store and throw away your medicines at www.disposemymeds.org.          This list is accurate as of: 9/22/17 11:07 AM.  Always use your most recent med list.                   Brand Name Dispense Instructions for use Diagnosis    ACCU-CHEK BRADLEY PLUS test strip   Generic drug:  blood glucose monitoring      1  time per day        albuterol 108 (90 BASE) MCG/ACT Inhaler    PROAIR HFA/PROVENTIL HFA/VENTOLIN HFA     Inhale 2 puffs into the lungs every 4 hours as needed        ANTI-DIARRHEAL 2 MG capsule   Generic drug:  loperamide      Take 2 mg by mouth as needed for diarrhea        atorvastatin 10 MG tablet    LIPITOR     Take 0.5 tablets (5 mg total) by mouth every night at bedtime.        blood glucose monitoring meter device kit      1 Device        clobetasol 0.05 % cream    TEMOVATE     apply topically PRN        clotrimazole 1 % cream    LOTRIMIN    40 g    Apply topically 2 times daily    Fungal infection of skin, Endometrial cancer (H)       docusate sodium 100 MG capsule    COLACE     Take 100 mg by mouth 2 times daily as needed        doxepin 10 MG capsule    SINEquan     Take 20 mg by mouth At Bedtime        escitalopram 20 MG tablet    LEXAPRO     Take 20 mg by mouth At Bedtime        furosemide 40 MG tablet    LASIX     Take 40 mg by mouth 2 times daily        Lancet Device Misc      As Directed        Melatonin 5 MG Tbdp      Take 2.5-5 mg by mouth nightly as needed        metFORMIN 500 MG tablet    GLUCOPHAGE     Take 500 mg by mouth daily (with dinner)        mupirocin 2 % ointment    BACTROBAN     Apply to affected area 2 times a day as needed for other (Specify) (rash on back of neck)        SILVRSTAT WOUND DRESSING Gel     85.05 g    Externally apply topically 2 times daily    Skin desquamation

## 2017-09-22 NOTE — PROGRESS NOTES
Mary Anglin is here for scheduled HDR brachytherapy    HDR Multi Channel Cylinder   4    Pre-procedure-  Time out done by Elyse Hernandez RN and Dr Kessler.   Patient identified, arm band applied, signed consent available   Medications taken by patient prior to procedure No medications were taken prior  Pain assessment: 0/10    Post-procedure-  Pain assessment: 0/10   Device removed without difficulty, Discharge teaching reviewed, questions answered and Discharged to home

## 2017-09-25 ENCOUNTER — ALLIED HEALTH/NURSE VISIT (OUTPATIENT)
Dept: RADIATION ONCOLOGY | Facility: CLINIC | Age: 47
End: 2017-09-25
Attending: RADIOLOGY
Payer: MEDICAID

## 2017-09-25 VITALS — DIASTOLIC BLOOD PRESSURE: 97 MMHG | SYSTOLIC BLOOD PRESSURE: 140 MMHG | HEART RATE: 96 BPM

## 2017-09-25 DIAGNOSIS — C54.1 ENDOMETRIAL CANCER (H): Primary | ICD-10-CM

## 2017-09-25 NOTE — MR AVS SNAPSHOT
After Visit Summary   9/25/2017    Mary Anglin    MRN: [id number]           Patient Information     Date Of Birth          1970        Visit Information        Provider Department      9/25/2017 8:45 AM Jarrod Kessler MD Radiation Oncology Clinic        Today's Diagnoses     Endometrial cancer (H)    -  1      Care Instructions    Discharge Information - Gynecological Cancer  Many side effects of treatment are temporary.  They will slowly improve after your treatments have ended.  Skin problems often improve within two to four weeks.  It can take longer for your energy to return.    Go to all of your follow-up visits with your doctor.  Call if you have new symptoms or problems.    Keep up focusing on good nutrition for at least four more weeks.  If you maintain your weight and nutrition, you will recover faster.    If you have given up some foods, go back to them very slowly and in small amounts.  If they cause cramps or diarrhea, wait another week or two before trying them again.    Drink plenty of fluids.    Keep caring for skin in the treated area  - Continue with sitz baths if needed and applying skin ointment or cream as needed or until your first follow-up visit.  - Avoid very hot water when bathing  - Avoid deodorant, perfumed soap, ointments, salves and tight underwear for six weeks.  - Do not expose treated skin to direct sunlight, heat lamps, sun lamps or other extreme hot or cold.  You will need to do this for the rest of your life.    Keep using your vaginal dilator if you have been given one.  You will need to do this for the rest of your life.    You may need to use water soluble gel during sex to avoid discomfort.  You may need to do this for the rest of your life.  When should I call my doctor?  [phone]  Call your doctor if you have any of these new symptoms:    Nausea, vomiting or diarrhea.    Feeling dizzy    Weight loss    Pain    Skin problems    Vaginal bleeding or  fluid that soaks more than three pads a day or any bleeding with sex    A fever over 100.4 (38 C) (taken under the tongue)    Any sudden change in your condition          Follow-ups after your visit        Follow-up notes from your care team     Return in about 4 weeks (around 10/23/2017).      Your next 10 appointments already scheduled     Oct 23, 2017  9:00 AM CDT   Return Visit with Hailey Ambriz MD   Radiation Oncology Clinic ([facility])    [facility]  1st Floor  [address] [id number] [phone]            Oct 23, 2017 10:20 AM CDT   (Arrive by 10:05 AM)   Return Visit with Abiodun Gore MD   [facility] ([facility])    [address]  [address]nd [facility] [id number] [phone]              Who to contact     Please call your clinic at [phone] to:    Ask questions about your health    Make or cancel appointments    Discuss your medicines    Learn about your test results    Speak to your doctor   If you have compliments or concerns about an experience at your clinic, or if you wish to file a complaint, please contact [facility] Patient Relations at [phone] or email us at [email or web address]         Additional Information About Your Visit        MyChart Information     [employer] is an electronic gateway that provides easy, online access to your medical records. With [employer], you can request a clinic appointment, read your test results, renew a prescription or communicate with your care team.     To sign up for Entrustett visit the website at [email or web address]   You will be asked to enter the access code listed below, as well as some personal information. Please follow the directions to create your username and password.     Your access code is: [email or web address]  Expires: 2017 11:43 AM     Your access code will  in 90  days. If you need help or a new code, please contact your [facility] or call [phone] for assistance.        Care EveryWhere ID     This is your Care EveryWhere ID. This could be used by other organizations to access your [place] medical records  [id number]        Your Vitals Were     Pulse                   96            Blood Pressure from Last 3 Encounters:   09/25/17 (!) 140/97   09/22/17 168/79   09/20/17 175/68    Weight from Last 3 Encounters:   09/22/17 129.7 kg (286 lb)   09/20/17 130.2 kg (287 lb)   08/29/17 130.6 kg (288 lb)              Today, you had the following     No orders found for display       Primary Care Provider Office Phone # Fax #    Mikel Wells [phone]       [facility] [address] [address]        Equal Access to Services     ALEX ROSAS : Hadii aad ku hadasho Soomaali, waaxda luqadaha, qaybta kaalmada adeegyada, waxay nohemyin haykokon natalia turner . So [facility] [place] [facility] [phone].    ATENCIÓN: Si habla español, tiene a do disposición servicios gratuitos de asistencia lingüística. Gregor al [phone].    We comply with applicable federal civil rights laws and [place] laws. We do not discriminate on the basis of race, color, national origin, age, disability sex, sexual orientation or gender identity.            Thank you!     Thank you for choosing [facility]  for your care. Our goal is always to provide you with excellent care. Hearing back from our patients is one way we can continue to improve our services. Please take a few minutes to complete the written survey that you may receive in the mail after your visit with us. Thank you!             Your Updated Medication List - Protect others around you: Learn how to safely use, store and throw away your medicines at www.disposemymeds.org.          This list is accurate as of: 9/25/17 11:13 AM.  Always use your most recent med list.                    Brand Name Dispense Instructions for use Diagnosis    ACCU-CHEK BRADLEY PLUS test strip   Generic drug:  blood glucose monitoring      1 time per day        albuterol 108 (90 BASE) MCG/ACT Inhaler    PROAIR HFA/PROVENTIL HFA/VENTOLIN HFA     Inhale 2 puffs into the lungs every 4 hours as needed        ANTI-DIARRHEAL 2 MG capsule   Generic drug:  loperamide      Take 2 mg by mouth as needed for diarrhea        atorvastatin 10 MG tablet    LIPITOR     Take 0.5 tablets (5 mg total) by mouth every night at bedtime.        blood glucose monitoring meter device kit      1 Device        clobetasol 0.05 % cream    TEMOVATE     apply topically PRN        clotrimazole 1 % cream    LOTRIMIN    40 g    Apply topically 2 times daily    Fungal infection of skin, Endometrial cancer (H)       docusate sodium 100 MG capsule    COLACE     Take 100 mg by mouth 2 times daily as needed        doxepin 10 MG capsule    SINEquan     Take 20 mg by mouth At Bedtime        escitalopram 20 MG tablet    LEXAPRO     Take 20 mg by mouth At Bedtime        furosemide 40 MG tablet    LASIX     Take 40 mg by mouth 2 times daily        Lancet Device Misc      As Directed        Melatonin 5 MG Tbdp      Take 2.5-5 mg by mouth nightly as needed        metFORMIN 500 MG tablet    GLUCOPHAGE     Take 500 mg by mouth daily (with dinner)        mupirocin 2 % ointment    BACTROBAN     Apply to affected area 2 times a day as needed for other (Specify) (rash on back of neck)        SILVRSTAT WOUND DRESSING Gel     85.05 g    Externally apply topically 2 times daily    Skin desquamation

## 2017-09-25 NOTE — PROGRESS NOTES
Mary Derrek is here for scheduled HDR brachytherapy    HDR Multi Channel Cylinder   5    Pre-procedure-    Patient identified, arm band applied, signed consent available   Medications taken by patient prior to procedure none  Pain assessment: none  BP (!) 140/97  Pulse 96    Post-procedure-  Pain assessment: no pain   Device removed without difficulty, Discharge teaching reviewed, questions answered, Vaginal dilator use reviewed, Follow-up scheduled and Discharged to home.

## 2017-09-25 NOTE — PATIENT INSTRUCTIONS
Discharge Information - Gynecological Cancer  Many side effects of treatment are temporary.  They will slowly improve after your treatments have ended.  Skin problems often improve within two to four weeks.  It can take longer for your energy to return.    Go to all of your follow-up visits with your doctor.  Call if you have new symptoms or problems.    Keep up focusing on good nutrition for at least four more weeks.  If you maintain your weight and nutrition, you will recover faster.    If you have given up some foods, go back to them very slowly and in small amounts.  If they cause cramps or diarrhea, wait another week or two before trying them again.    Drink plenty of fluids.    Keep caring for skin in the treated area  - Continue with sitz baths if needed and applying skin ointment or cream as needed or until your first follow-up visit.  - Avoid very hot water when bathing  - Avoid deodorant, perfumed soap, ointments, salves and tight underwear for six weeks.  - Do not expose treated skin to direct sunlight, heat lamps, sun lamps or other extreme hot or cold.  You will need to do this for the rest of your life.    Keep using your vaginal dilator if you have been given one.  You will need to do this for the rest of your life.    You may need to use water soluble gel during sex to avoid discomfort.  You may need to do this for the rest of your life.  When should I call my doctor?  [phone]  Call your doctor if you have any of these new symptoms:    Nausea, vomiting or diarrhea.    Feeling dizzy    Weight loss    Pain    Skin problems    Vaginal bleeding or fluid that soaks more than three pads a day or any bleeding with sex    A fever over 100.4 (38 C) (taken under the tongue)    Any sudden change in your condition

## 2017-09-28 ENCOUNTER — ONCOLOGY VISIT (OUTPATIENT)
Dept: RADIATION ONCOLOGY | Facility: CLINIC | Age: 47
End: 2017-09-28

## 2017-09-28 NOTE — MR AVS SNAPSHOT
After Visit Summary   9/28/2017    Mary Anglin    MRN: [id number]           Patient Information     Date Of Birth          1970        Visit Information        Provider Department      9/28/2017 10:00 PM Hailey Ambriz MD Radiation Oncology Clinic         Follow-ups after your visit        Your next 10 appointments already scheduled     Oct 23, 2017  8:00 AM CDT   (Arrive by 7:45 AM)   CT CHEST ABDOMEN PELVIS W/O & W CONTRAST with UCCT2   [facility] ([facility])    [address] [address] [phone]           Please bring any scans or X-rays taken at other hospitals, if similar tests were done. Also bring a list of your medicines, including vitamins, minerals and over-the-counter drugs. It is safest to leave personal items at home.  Be sure to tell your doctor:   If you have any allergies.   If there s any chance you are pregnant.   If you are breastfeeding.   If you have any special needs.  You may have contrast for this exam. To prepare:   Do not eat or drink for 2 hours before your exam. If you need to take medicine, you may take it with small sips of water. (We may ask you to take liquid medicine as well.)   The day before your exam, drink extra fluids at least six 8-ounce glasses (unless your doctor tells you to restrict your fluids).  Patients over 70 or patients with diabetes or kidney problems:   If you haven t had a blood test (creatinine test) within the last 30 days, go to your clinic or Diagnostic Imaging Department for this test.  If you have diabetes:   If your kidney function is normal, continue taking your metformin (Avandamet, Glucophage, Glucovance, Metaglip) on the day of your exam.   If your kidney function is abnormal, wait 48 hours before restarting this medicine.  You will have oral contrast for this exam:   You will drink the contrast at home. Get this from your clinic or Diagnostic  Imaging Department. Please follow the directions given.  Please wear loose clothing, such as a sweat suit or jogging clothes. Avoid snaps, zippers and other metal. We may ask you to undress and put on a hospital gown.  If you have any questions, please call the Imaging Department where you will have your exam.            Oct 23, 2017  9:00 AM CDT   Return Visit with Hailey Ambriz MD   Radiation Oncology Clinic ([facility])    [facility]  1st Floor  [address] [id number] [phone]            Oct 23, 2017 10:20 AM CDT   (Arrive by 10:05 AM)   Return Visit with Abiodun Gore MD   [facility] ([facility])    [address]  [address] [facility] [id number] [phone]              Who to contact     Please call your clinic at [phone] to:    Ask questions about your health    Make or cancel appointments    Discuss your medicines    Learn about your test results    Speak to your doctor   If you have compliments or concerns about an experience at your clinic, or if you wish to file a complaint, please contact [facility] Patient Relations at [phone] or email us at [email or web address]         Additional Information About Your Visit        MyChart Information     [facility] is an electronic gateway that provides easy, online access to your medical records. With [facility], you can request a clinic appointment, read your test results, renew a prescription or communicate with your care team.     To sign up for Keepskort visit the website at [email or web address]   You will be asked to enter the access code listed below, as well as some personal information. Please follow the directions to create your username and password.     Your access code is: [email or web address]  Expires: 2017 11:43 AM     Your access code will  in 90 days. If you need help  or a new code, please contact your [facility] Clinic or call [phone] for assistance.        Care EveryWhere ID     This is your Care EveryWhere ID. This could be used by other organizations to access your [place] medical records  [id number]         Blood Pressure from Last 3 Encounters:   No data found for BP    Weight from Last 3 Encounters:   No data found for Wt              Today, you had the following     No orders found for display       Primary Care Provider Office Phone # Fax #    Mikel Wells [phone]       [facility] [address] [facility] [address]        Equal Access to Services     [facility]: Hadii aad ku hadasho Soomaali, waaxda luqadaha, qaybta kaalmada adeegyada, waxmiquel slaterin hayaan adeeg zackery turner . So [facility] [phone].    ATENCIÓN: Si habla español, tiene a do disposición servicios gratuitos de asistencia lingüística. [facility] [phone].    We comply with applicable federal civil rights laws and [place] laws. We do not discriminate on the basis of race, color, national origin, age, disability, sex, sexual orientation, or gender identity.            Thank you!     Thank you for choosing [facility]  for your care. Our goal is always to provide you with excellent care. Hearing back from our patients is one way we can continue to improve our services. Please take a few minutes to complete the written survey that you may receive in the mail after your visit with us. Thank you!             Your Updated Medication List - Protect others around you: Learn how to safely use, store and throw away your medicines at www.disposemymeds.org.          This list is accurate as of: 9/28/17 11:59 PM.  Always use your most recent med list.                   Brand Name Dispense Instructions for use Diagnosis    ACCU-CHEK BRADLEY PLUS test strip   Generic drug:  blood glucose monitoring      1 time per day         albuterol 108 (90 BASE) MCG/ACT Inhaler    PROAIR HFA/PROVENTIL HFA/VENTOLIN HFA     Inhale 2 puffs into the lungs every 4 hours as needed        ANTI-DIARRHEAL 2 MG capsule   Generic drug:  loperamide      Take 2 mg by mouth as needed for diarrhea        atorvastatin 10 MG tablet    LIPITOR     Take 0.5 tablets (5 mg total) by mouth every night at bedtime.        blood glucose monitoring meter device kit      1 Device        clobetasol 0.05 % cream    TEMOVATE     apply topically PRN        clotrimazole 1 % cream    LOTRIMIN    40 g    Apply topically 2 times daily    Fungal infection of skin, Endometrial cancer (H)       docusate sodium 100 MG capsule    COLACE     Take 100 mg by mouth 2 times daily as needed        doxepin 10 MG capsule    SINEquan     Take 20 mg by mouth At Bedtime        escitalopram 20 MG tablet    LEXAPRO     Take 20 mg by mouth At Bedtime        furosemide 40 MG tablet    LASIX     Take 40 mg by mouth 2 times daily        iohexol 140 MG/ML Soln solution    OMNIPAQUE    50 mL    Mix entire bottle (50ml) of contast with 600ml (20 ounces) of water and drink half 2 hrs prior to CT scan and half 1 hr prior to scan    Endometrial cancer (H)       Lancet Device Misc      As Directed        Melatonin 5 MG Tbdp      Take 2.5-5 mg by mouth nightly as needed        metFORMIN 500 MG tablet    GLUCOPHAGE     Take 500 mg by mouth daily (with dinner)        mupirocin 2 % ointment    BACTROBAN     Apply to affected area 2 times a day as needed for other (Specify) (rash on back of neck)        SILVRSTAT WOUND DRESSING Gel     85.05 g    Externally apply topically 2 times daily    Skin desquamation

## 2017-10-13 NOTE — PROCEDURES
Radiotherapy Treatment Summary          Date of Report: 2017     PATIENT: FLORES HERNÁNDEZ  MEDICAL RECORD NO: [id number]  : 1970     DIAGNOSIS: C54.1 Malignant neoplasm of endometrium  INTENT OF RADIOTHERAPY: Cure  PATHOLOGY: Endometrial adenocarcinoma                                    STAGE: Recurrent (previous FIGO IB)  CONCURRENT SYSTEMIC THERAPY: None                   Details of the treatments summarized below are found in records kept in the Department of Radiation Oncology at [facility].     Treatment Summary:  Radiation Oncology - Course: 1 Protocol:   Treatment Site Current Dose            Modality From To Elapsed Days Fx.  1 Pelvis (Fernando)  4,500 cGy             06 X  7/31/2017  2017  32 25  1 Vag Brachy (cylinder)1,800 cGy    9/15/2017  2017   5  3  1 HRCTV (cylinder)  1,000 cGy         2017   3  2          Dose per Fraction:  180 cGy/600 cGy/500 cGy      Total Dose: 7,300 cGy              COMMENTS:                      (Acute Toxicity Profile by CTC v4.0)  Ms. 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   She was originally diagnosed with grade 1 endometrial cancer, FIGO stage IB in . She underwent   JEMAL/BSO/LND with no adjuvant therapy. In 2017  she presented with vaginal bleeding and was found to   have a 1.5 to 2cm friable mass along the anterior vaginal wall, in close proximity to the introitus. This was   biopsied and positive for recurrent endometrial adenocarcinoma, grade unspecified No additional disease was   identified on restaging scans. She completed a course of external beam radiation therapy to the pelvis,receiving   4500 cGy in 25 fractions. A pelvic MRI completed after EBRTshowed that the previous nodularity along the   anterior vaginal wall had almost completely resolved. There was some residual signal change, which was   indeterminant and felt to  potentially represent treatment related changes. Given the degree of response to her   external beam treatment, we felt that she would be a candidate for intracavitary brachytherapy with a   multichannel vaginal cylinder as opposed to interstitial brachytherapy. She received 1800 cGy in 3 fractions   prescribed to the vaginal surface to an active length of 7.5 cm. this was followed by a boost of 1000 cGy in 2   fractions to an HRCTV, which was determined from her initial imaging as well as a gold fiducial marker that   was placed prior to external beam radiation therapy. Overall, she tolerated the treatment with expected local   toxicities including grade 2 skin reaction, grade 1 diarrhea, and grade 1 fatigue. Her skin reaction was confined   to the groin and beneath the pannus. This was managed with Silvrstat and mepilex.       PAIN MANAGEMENT:   N/A                           FOLLOW UP PLAN:  Return to clinic for follow up with Dr. Ambriz 1 month after treatment completion                             Resident Physician:   Lazaro Nichols M.D.   Staff Physician: Hailey Ambriz M.D.  Physicist: Pio Arreola, PhD     CC:   MD Chris Bolden MD                                    Radiation Oncology:  [facility] [address] [place] [address]

## 2017-10-23 ENCOUNTER — ONCOLOGY VISIT (OUTPATIENT)
Dept: ONCOLOGY | Facility: CLINIC | Age: 47
End: 2017-10-23
Attending: OBSTETRICS & GYNECOLOGY
Payer: MEDICAID

## 2017-10-23 ENCOUNTER — OFFICE VISIT (OUTPATIENT)
Dept: RADIATION ONCOLOGY | Facility: CLINIC | Age: 47
End: 2017-10-23
Attending: RADIOLOGY
Payer: MEDICAID

## 2017-10-23 VITALS
RESPIRATION RATE: 17 BRPM | TEMPERATURE: 98.5 F | WEIGHT: 271 LBS | BODY MASS INDEX: 46.26 KG/M2 | HEART RATE: 68 BPM | OXYGEN SATURATION: 97 % | DIASTOLIC BLOOD PRESSURE: 79 MMHG | SYSTOLIC BLOOD PRESSURE: 130 MMHG | HEIGHT: 64 IN

## 2017-10-23 VITALS
SYSTOLIC BLOOD PRESSURE: 130 MMHG | BODY MASS INDEX: 46.52 KG/M2 | WEIGHT: 271 LBS | HEART RATE: 68 BPM | OXYGEN SATURATION: 97 % | DIASTOLIC BLOOD PRESSURE: 79 MMHG

## 2017-10-23 DIAGNOSIS — R30.0 DYSURIA: ICD-10-CM

## 2017-10-23 DIAGNOSIS — C54.1 ENDOMETRIAL CANCER (H): Primary | ICD-10-CM

## 2017-10-23 LAB
ALBUMIN UR-MCNC: NEGATIVE MG/DL
APPEARANCE UR: CLEAR
BILIRUB UR QL STRIP: NEGATIVE
COLOR UR AUTO: YELLOW
GLUCOSE UR STRIP-MCNC: NEGATIVE MG/DL
HGB UR QL STRIP: NEGATIVE
KETONES UR STRIP-MCNC: NEGATIVE MG/DL
LEUKOCYTE ESTERASE UR QL STRIP: ABNORMAL
NITRATE UR QL: NEGATIVE
PH UR STRIP: 5 PH (ref 5–7)
RBC #/AREA URNS AUTO: 1 /HPF (ref 0–2)
SOURCE: ABNORMAL
SP GR UR STRIP: 1.01 (ref 1–1.03)
SQUAMOUS #/AREA URNS AUTO: <1 /HPF (ref 0–1)
UROBILINOGEN UR STRIP-MCNC: 0 MG/DL (ref 0–2)
WBC #/AREA URNS AUTO: 3 /HPF (ref 0–2)

## 2017-10-23 PROCEDURE — 81001 URINALYSIS AUTO W/SCOPE: CPT | Performed by: RADIOLOGY

## 2017-10-23 PROCEDURE — 99212 OFFICE O/P EST SF 10 MIN: CPT | Mod: 27 | Performed by: RADIOLOGY

## 2017-10-23 PROCEDURE — 99212 OFFICE O/P EST SF 10 MIN: CPT | Mod: ZF

## 2017-10-23 PROCEDURE — 99212 OFFICE O/P EST SF 10 MIN: CPT | Mod: 25 | Performed by: RADIOLOGY

## 2017-10-23 PROCEDURE — 99214 OFFICE O/P EST MOD 30 MIN: CPT | Mod: ZP | Performed by: OBSTETRICS & GYNECOLOGY

## 2017-10-23 ASSESSMENT — PAIN SCALES - GENERAL
PAINLEVEL: NO PAIN (0)
PAINLEVEL: NO PAIN (0)

## 2017-10-23 NOTE — LETTER
10/23/2017       RE: Mary Anglin  [address]   [place], [place] [facility] [address]     Dear Colleague,    Thank you for referring your patient, Mary Anglin, to the RADIATION ONCOLOGY CLINIC. Please see a copy of my visit note below.    [facility], [place]  Radiation Oncology Follow-up Note  2017    Mary Anglin   MRN: [id number]  : 1970     DISEASE TREATED: Recurrent endometrial adenocarcinoma     INTERVAL SINCE COMPLETION OF RADIATION THERAPY: 1 month; completed treatment on 17.     TYPE OF RADIATION THERAPY ADMINISTERED: 4500 cGy in 25 fractions to pelvis + 2800 cGy in 5 fractions intracavitary brachytherapy boost     SUBJECTIVE: Ms. Anglin is a 47 year old female with diagnosis of recurrent endometrial cancer confined to the vagina. She was originally diagnosed with grade 1 endometrial cancer, FIGO stage IB in . She underwent JEMAL/BSO/LND with no adjuvant therapy. In 2017  she presented with vaginal bleeding and was found to have a 1.5 to 2cm friable mass along the anterior vaginal wall, in close proximity to the introitus. This was biopsied and positive for recurrent endometrial adenocarcinoma, grade unspecified No additional disease was identified on restaging scans. She completed a course of external beam radiation therapy to the pelvis,receiving   4500 cGy in 25 fractions. A pelvic MRI completed after EBRTshowed that the previous nodularity along the anterior vaginal wall had almost completely resolved. There was some residual signal change, which was indeterminant and felt to potentially represent treatment related changes. Given the degree of response to her external beam treatment, we felt that she would be a candidate for intracavitary brachytherapy with a multichannel vaginal cylinder as opposed to interstitial brachytherapy. She received 1800 cGy in 3 fractions prescribed to the vaginal surface to an active length of 7.5 cm.  this was followed by a boost of 1000 cGy in 2 fractions to an HRCTV, which was determined from her initial imaging as well as a gold fiducial marker that was placed prior to external beam radiation therapy. Overall, she tolerated the treatment with expected local toxicities including grade 2 skin reaction, grade 1 diarrhea, and grade 1 fatigue. Her skin reaction was confined   to the groin and beneath the pannus. This was managed with Silvrstat and mepilex. She returns to clinic today for a routine follow up visit 1 month after completing RT.    On exam today, Ms. Anglin reports that she is still recovering from some of the acute side effects of treatment. She continues to have occasional diarrhea consisting of a couple episodes of loose stools every 2-3 days, however this is improved since treatment. She also complains of persistent dysuria since the time of brachytherapy which has fluctuated in severity. She has some associated nausea but no fevers or other systemic symptoms. She reports that she has not been using her dilator as a result of the dysuria. She did have a significant skin reaction during treatment but this has healed completely at this time. She continues to endorse some fatigue but this is improving. She has an appointment later today with Dr. Gore.     OBJECTIVE:   /79  Pulse 68  Wt 122.9 kg (271 lb)  SpO2 97%  BMI 46.52 kg/m2  GENERAL:  Appears well, in no acute distress.   HEENT: NCAT  RESP: Breathing comfortably on RA  CV: WWP  PELVIS: Erythema of vaginal mucosa and perineum  SKIN: No residual skin reaction in intratriginous area of pannus or groin folds  NEURO: CNs grossly intact       IMPRESSION: Recurrent endometrial adenocarcinoma of the anterior vaginal wall. She is now 1 month s/p salvage RT with resolving acute toxicity.      RECOMMENDATIONS:   1. UA today  2. Gyn onc follow up with Dr. Gore today  3. RTC in 3 months (at same time as gyn onc)      The patient  was seen and discussed with staff, Dr. Ambriz.    Lazaro Lind MD  Resident, PGY-3  Department of Radiation Oncology  [facility]  [phone]    I saw the patient with the resident.  I agree with the resident note and plan of care.      Hailey Ambriz MD    FOLLOW-UP VISIT    Patient Name: Mary Anglin      : 1970     Age: 47 year old        ______________________________________________________________________________     Chief Complaint   Patient presents with     Cancer     Follow up for Pelvis 4500 cGy completed on 17, Brachytherapy x 3 1800 cGy completed on 17, HRCTV x 2 7000 cGy completed on 17     /79  Pulse 68  Wt 122.9 kg (271 lb)  SpO2 97%  BMI 46.52 kg/m2     Date Radiation Completed: 17    Pain  Denies    Labs  Other Labs: Yes: 10/23/17    Imaging  CT: 10/23/17        Diarrhea: 1- Abdominal cramping: two or less soft or liquid bowel movements per day    Constipation: 0- None    Nausea: 0- None    Vomitin- No vomiting    Genitourinary system: 0- Normal    Dysuria: 2- Moderate    Vaginal dilator use: NOT USING      Other Appointments:     MD Name:  Appointment Date:    MD Name: Appointment Date:   MD Name: Appointment Date:   Other Appointment Notes:     Residual Radiation side effect: DIARRHEA,PAINFUL URINATION     Additional Instructions:     Nurse face-to-face time: Level 2:  5 min face to face time    Again, thank you for allowing me to participate in the care of your patient.      Sincerely,    Hailey Ambriz MD

## 2017-10-23 NOTE — MR AVS SNAPSHOT
After Visit Summary   10/23/2017    Mary Anglin    MRN: [id number]           Patient Information     Date Of Birth          1970        Visit Information        Provider Department      10/23/2017 9:00 AM Hailey Ambriz MD Radiation Oncology Clinic        Today's Diagnoses     Endometrial cancer (H)    -  1       Follow-ups after your visit        Your next 10 appointments already scheduled     Oct 23, 2017 10:20 AM CDT   (Arrive by 10:05 AM)   Return Visit with Abiodun Gore MD   [facility] ([facility])    [address]  2nd Floor  [facility] [id number] [phone]            Jan 22, 2018 10:00 AM CST   Return Visit with Hailey Ambriz MD   Radiation Oncology Clinic ([facility])    [facility]  1st Floor  [address] [place] [id number] [phone]              Who to contact     Please call your clinic at [phone] to:    Ask questions about your health    Make or cancel appointments    Discuss your medicines    Learn about your test results    Speak to your doctor   If you have compliments or concerns about an experience at your clinic, or if you wish to file a complaint, please contact [facility] Patient Relations at [phone] or email us at [email or web address]         Additional Information About Your Visit        MyChart Information     ModiFacet is an electronic gateway that provides easy, online access to your medical records. With [employer], you can request a clinic appointment, read your test results, renew a prescription or communicate with your care team.     To sign up for ModiFacet visit the website at [email or web address]   You will be asked to enter the access code listed below, as well as some personal information. Please follow the directions to create your username and password.     Your access code  is: [email or web address]  Expires: 2017 11:43 AM     Your access code will  in 90 days. If you need help or a new code, please contact your [facility] or call [phone] for assistance.        Care EveryWhere ID     This is your Care EveryWhere ID. This could be used by other organizations to access your [place] medical records  [id number]        Your Vitals Were     Pulse Pulse Oximetry BMI (Body Mass Index)             68 97% 46.52 kg/m2          Blood Pressure from Last 3 Encounters:   10/23/17 130/79   17 (!) 140/97   17 168/79    Weight from Last 3 Encounters:   10/23/17 122.9 kg (271 lb)   17 129.7 kg (286 lb)   17 130.2 kg (287 lb)              Today, you had the following     No orders found for display       Primary Care Provider Office Phone # Fax #    Mikel Wells [phone]       [place] [address]        Equal Access to Services     HEATHER ROSAS : Hadii peggy juarez Sourban, waaxda lucamilo, qaybta kaalmada cecy, loraine turner . So [facility] [phone].    ATENCIÓN: Si habla español, tiene a do disposición servicios gratuitos de asistencia lingüística. [facility] [phone].    We comply with applicable federal civil rights laws and [place] laws. We do not discriminate on the basis of race, color, national origin, age, disability, sex, sexual orientation, or gender identity.            Thank you!     Thank you for choosing [facility] ONCOL[facility]Y CLINIC  for your care. Our goal is always to provide you with excellent care. Hearing back from our patients is one way we can continue to improve our services. Please take a few minutes to complete the written survey that you may receive in the mail after your visit with us. Thank you!             Your Updated Medication List - Protect others around you: Learn how to safely use, store and throw away your  medicines at www.disposemymeds.org.          This list is accurate as of: 10/23/17  9:49 AM.  Always use your most recent med list.                   Brand Name Dispense Instructions for use Diagnosis    ACCU-CHEK BRADLEY PLUS test strip   Generic drug:  blood glucose monitoring      1 time per day        albuterol 108 (90 BASE) MCG/ACT Inhaler    PROAIR HFA/PROVENTIL HFA/VENTOLIN HFA     Inhale 2 puffs into the lungs every 4 hours as needed        ANTI-DIARRHEAL 2 MG capsule   Generic drug:  loperamide      Take 2 mg by mouth as needed for diarrhea        atorvastatin 10 MG tablet    LIPITOR     Take 0.5 tablets (5 mg total) by mouth every night at bedtime.        blood glucose monitoring meter device kit      1 Device        clobetasol 0.05 % cream    TEMOVATE     apply topically PRN        clotrimazole 1 % cream    LOTRIMIN    40 g    Apply topically 2 times daily    Fungal infection of skin, Endometrial cancer (H)       docusate sodium 100 MG capsule    COLACE     Take 100 mg by mouth 2 times daily as needed        doxepin 10 MG capsule    SINEquan     Take 20 mg by mouth At Bedtime        escitalopram 20 MG tablet    LEXAPRO     Take 20 mg by mouth At Bedtime        furosemide 40 MG tablet    LASIX     Take 40 mg by mouth 2 times daily        iohexol 140 MG/ML Soln solution    OMNIPAQUE    50 mL    Mix entire bottle (50ml) of contast with 600ml (20 ounces) of water and drink half 2 hrs prior to CT scan and half 1 hr prior to scan    Endometrial cancer (H)       Lancet Device Misc      As Directed        Melatonin 5 MG Tbdp      Take 2.5-5 mg by mouth nightly as needed        metFORMIN 500 MG tablet    GLUCOPHAGE     Take 500 mg by mouth daily (with dinner)        mupirocin 2 % ointment    BACTROBAN     Apply to affected area 2 times a day as needed for other (Specify) (rash on back of neck)        SILVRSTAT WOUND DRESSING Gel     85.05 g    Externally apply topically 2 times daily    Skin desquamation

## 2017-10-23 NOTE — PROGRESS NOTES
Follow Up Notes on Referred Patient    Date: 10/23/2017       Dr. Emily Cohen MD  No address on file       RE: Mary Anglin  : 1970  VALENTE: 10/23/2017    Dear Dr. Emily Cohen:    Mary Anglin is a 47 year old woman with a diagnosis of recurrent endometrial cancer.     The patient presents today for followup.  She has finished external beam radiation as well as brachytherapy for eventual recurrence of endometrioid endometrial carcinoma.  She still has some occasional diarrhea, loose stools every other day, has some discomfort in the vulvar area.  She has seen my colleague, Dr. Amrbiz, and has a urinalysis pending to rule out urinary tract infections, otherwise no B symptoms and is otherwise doing well overall.         Past Medical History:    Past Medical History:   Diagnosis Date     Cellulitis of the lower extremities.      Chronic edema of the lower extremities      Depression      Diabetes (H)      Endometrial cancer (H)      GERD (gastroesophageal reflux disease)      HLD (hyperlipidemia)      Morbid obesity (H)      MRSA (methicillin resistant Staphylococcus aureus) infection      Vertigo          Past Surgical History:    Past Surgical History:   Procedure Laterality Date     C TOTAL ABDOM HYSTERECTOMY       COLONOSCOPY       HERNIORRHAPHY UMBILICAL       INSERT RADIATION SEED MARKER CERVIX N/A 2017    Procedure: INSERT RADIATION SEED MARKER CERVIX;  Pelvic Exam, Insertion Radiation Gold Seed Markers;  Surgeon: Jarrod Kessler MD;  Location: [facility] OR     SALPINGO-OOPHORECTOMY BILATERAL       TONSILLECTOMY       UPPER GI ENDOSCOPY           Health Maintenance Due   Topic Date Due     TETANUS IMMUNIZATION (SYSTEM ASSIGNED)  1987     PAP SCREENING Q3 YR (SYSTEM ASSIGNED)  1990     LIPID SCREEN Q5 YR FEMALE (SYSTEM ASSIGNED)  2014     INFLUENZA VACCINE (SYSTEM ASSIGNED)  2017       Current Medications:     Current Outpatient Prescriptions    Medication Sig Dispense Refill     Silver (SILVRSTAT WOUND DRESSING) GEL Externally apply topically 2 times daily 85.05 g 1     loperamide (ANTI-DIARRHEAL) 2 MG capsule Take 2 mg by mouth as needed for diarrhea       clotrimazole (LOTRIMIN) 1 % cream Apply topically 2 times daily 40 g 1     atorvastatin (LIPITOR) 10 MG tablet Take 0.5 tablets (5 mg total) by mouth every night at bedtime.       blood glucose monitoring (ACCU-CHEK BRADLEY PLUS) meter device kit 1 Device       blood glucose monitoring (ACCU-CHEK BRADLEY PLUS) test strip 1 time per day       clobetasol (TEMOVATE) 0.05 % cream apply topically PRN       docusate sodium (COLACE) 100 MG capsule Take 100 mg by mouth 2 times daily as needed        doxepin (SINEQUAN) 10 MG capsule Take 20 mg by mouth At Bedtime        escitalopram (LEXAPRO) 20 MG tablet Take 20 mg by mouth At Bedtime        furosemide (LASIX) 40 MG tablet Take 40 mg by mouth 2 times daily        Lancet Device MISC As Directed       Melatonin 5 MG TBDP Take 2.5-5 mg by mouth nightly as needed        metFORMIN (GLUCOPHAGE) 500 MG tablet Take 500 mg by mouth daily (with dinner)        mupirocin (BACTROBAN) 2 % ointment Apply to affected area 2 times a day as needed for other (Specify) (rash on back of neck)       iohexol (OMNIPAQUE) 140 MG/ML SOLN solution Mix entire bottle (50ml) of contast with 600ml (20 ounces) of water and drink half 2 hrs prior to CT scan and half 1 hr prior to scan (Patient not taking: Reported on 10/23/2017) 50 mL 0     albuterol (PROAIR HFA/PROVENTIL HFA/VENTOLIN HFA) 108 (90 BASE) MCG/ACT Inhaler Inhale 2 puffs into the lungs every 4 hours as needed            Allergies:        Allergies   Allergen Reactions     Penicillin G Other (See Comments)     Azithromycin Rash     Benzonatate Unknown     Cephalexin Unknown     Sulfamethoxazole W/Trimethoprim Rash     Bupropion Other (See Comments)     No rash     Clindamycin Other (See Comments)     Aspirin Other (See Comments)      "Pt refuses to take ASA        Social History:     Social History   Substance Use Topics     Smoking status: Never Smoker     Smokeless tobacco: Never Used     Alcohol use No       History   Drug Use No         Family History:       Family History   Problem Relation Age of Onset     Skin Cancer Father      KIDNEY DISEASE Father      Required hemodialysis     HEART DISEASE Maternal Grandfather      MENTAL ILLNESS Mother      Catatonia after loss of mother     Breast Cancer Paternal Grandmother      CEREBROVASCULAR DISEASE Maternal Grandmother      Hypertension Maternal Grandmother      DIABETES Maternal Grandmother      DIABETES Paternal Grandfather          Physical Exam:     /79  Pulse 68  Temp 98.5  F (36.9  C) (Oral)  Resp 17  Ht 1.626 m (5' 4\")  Wt 122.9 kg (271 lb)  SpO2 97%  BMI 46.52 kg/m2  Body mass index is 46.52 kg/(m^2).    General Appearance: healthy and alert, no distress     ABDOMEN:  Soft, nontender, nondistended, no organomegaly.   PELVIC:  External genitalia, patient has some radiation change on external genitalia.  No signs of infection.  Speculum exam limited due to patient discomfort.  Pelvic exam does not reveal any more lesions in the vaginal mucosa.  Prior nodule on anterior vaginal wall has disappeared.  No adnexal masses or tenderness.  Rectovaginal confirms.             Assessment:    Mary Anglin is a 47 year old woman with a diagnosis of recurrent endometrial cancer.     A total of 30 minutes was spent with the patient, 25 minutes of which were spent in counseling the patient and/or treatment planning.    1.  Recurrent endometrial carcinoma.   2.  Vulvar erythema.   3.  Class 3 obesity.      Discussed with the patient, PET scan today did not reveal any evidence of disease.  Will have another followup scan with a PET scan in like 3 months to rule out any persistent or new disease.  I will give her to use lidocaine gel for the discomfort with erythema around her vulva.  " Continue followup every 3-4 months surveillance for the first 2 years and then every 6 months the next 3 years and then yearly.  The patient agrees with this plan, is very appreciative of her care.  All questions were answered.       Abiodun Gore MD, MS    Department of Obstetrics and Gynecology   Division of Gynecologic Oncology   [facility]  Phone: [phone]          CC  Patient Care Team:  Mikel Wells MD as PCP - General (Family Practice)  Chris Roberson as Referring Physician (OB/Gyn)  SELF, REFERRED

## 2017-10-23 NOTE — MR AVS SNAPSHOT
After Visit Summary   10/23/2017    Mary Anglin    MRN: [id number]           Patient Information     Date Of Birth          1970        Visit Information        Provider Department      10/23/2017 10:20 AM Abiodun Gore MD [facility]        Today's Diagnoses     Endometrial cancer (H)    -  1       Follow-ups after your visit        Your next 10 appointments already scheduled     Jan 19, 2018  9:00 AM CST   PET ONCOLOGY WHOLE BODY with [id number]   [facility] ([facility])    [address] [address]   [phone]           Tell your doctor:   If there is any chance you may be pregnant or if you are breastfeeding.   If you have problems lying in small spaces (claustrophobia). If you do, your doctor may give you medicine to help you relax. If you have diabetes:   Have your exam early in the morning. Your blood glucose will go up as the day goes by.   Your glucose level must be 180 or less at the start of the exam. Please take any medicines you need to ensure this blood glucose level. 24 hours before your scan: Don t do any heavy exercise. (No jogging, aerobics or other workouts.) Exercise will make your pictures less accurate. 6 hours before your scan:   Stop all food and liquids (except water).   Do not chew gum or suck on mints.   If you need to take medicine with food, you may take it with a few crackers.  Please call your Imaging Department at your exam site with any questions.            Jan 22, 2018 10:00 AM CST   Return Visit with Hailey Ambriz MD   Radiation Oncology Clinic ([facility])    [facility]  1st Floor  [address] [address]   [phone]            Jan 22, 2018 11:00 AM CST   (Arrive by 10:45 AM)   Return Visit with MD ABDIEL [facility] ([facility])    [address]  "[address]  2nd Floor  [place] [address] [phone]              Who to contact     If you have questions or need follow up information about today's clinic visit or your schedule please contact [facility] directly at [phone].  Normal or non-critical lab and imaging results will be communicated to you by MyChart, letter or phone within 4 business days after the clinic has received the results. If you do not hear from us within 7 days, please contact the clinic through MyChart or phone. If you have a critical or abnormal lab result, we will notify you by phone as soon as possible.  Submit refill requests through [employer] or call your pharmacy and they will forward the refill request to us. Please allow 3 business days for your refill to be completed.          Additional Information About Your Visit        MyChart Information     [employer] lets you send messages to your doctor, view your test results, renew your prescriptions, schedule appointments and more. To sign up, go to [email or web address] . Click on \"Log in\" on the left side of the screen, which will take you to the Welcome page. Then click on \"Sign up Now\" on the right side of the page.     You will be asked to enter the access code listed below, as well as some personal information. Please follow the directions to create your username and password.     Your access code is: [email or web address]  Expires: 2017 11:43 AM     Your access code will  in 90 days. If you need help or a new code, please call your [place] clinic or [phone].        Care EveryWhere ID     This is your Care EveryWhere ID. This could be used by other organizations to access your [place] medical records  [id number]        Your Vitals Were     Pulse Temperature Respirations Height Pulse Oximetry BMI (Body Mass Index)    68 98.5  F (36.9  C) (Oral) 17 1.626 m (5' 4\") 97% 46.52 kg/m2       Blood Pressure from Last 3 Encounters:   No data found for BP "    Weight from Last 3 Encounters:   No data found for Wt              Today, you had the following     No orders found for display       Primary Care Provider Office Phone # Fax #    Mikel Wells [phone]       [facility] [address] [address]        Equal Access to Services     ALEX ROSAS : Hadii aad ku hadasho Soomaali, waaxda luqadaha, qaybta kaalmada adeegyada, waxmiquel slaterin haykokon natalia beardalejandracarlos harris. So [facility] [phone].    ATENCIÓN: Si habla español, tiene a do disposición servicios gratuitos de asistencia lingüística. [facility] [phone].    We comply with applicable federal civil rights laws and [place] laws. We do not discriminate on the basis of race, color, national origin, age, disability, sex, sexual orientation, or gender identity.            Thank you!     Thank you for choosing [facility]  for your care. Our goal is always to provide you with excellent care. Hearing back from our patients is one way we can continue to improve our services. Please take a few minutes to complete the written survey that you may receive in the mail after your visit with us. Thank you!             Your Updated Medication List - Protect others around you: Learn how to safely use, store and throw away your medicines at www.disposemymeds.org.          This list is accurate as of: 10/23/17 11:59 PM.  Always use your most recent med list.                   Brand Name Dispense Instructions for use Diagnosis    ACCU-CHEK BRADLEY PLUS test strip   Generic drug:  blood glucose monitoring      1 time per day        albuterol 108 (90 BASE) MCG/ACT Inhaler    PROAIR HFA/PROVENTIL HFA/VENTOLIN HFA     Inhale 2 puffs into the lungs every 4 hours as needed        ANTI-DIARRHEAL 2 MG capsule   Generic drug:  loperamide      Take 2 mg by mouth as needed for diarrhea        atorvastatin 10 MG tablet    LIPITOR     Take 0.5 tablets (5 mg total) by mouth every  night at bedtime.        blood glucose monitoring meter device kit      1 Device        clobetasol 0.05 % cream    TEMOVATE     apply topically PRN        clotrimazole 1 % cream    LOTRIMIN    40 g    Apply topically 2 times daily    Fungal infection of skin, Endometrial cancer (H)       docusate sodium 100 MG capsule    COLACE     Take 100 mg by mouth 2 times daily as needed        doxepin 10 MG capsule    SINEquan     Take 20 mg by mouth At Bedtime        escitalopram 20 MG tablet    LEXAPRO     Take 20 mg by mouth At Bedtime        furosemide 40 MG tablet    LASIX     Take 40 mg by mouth 2 times daily        iohexol 140 MG/ML Soln solution    OMNIPAQUE    50 mL    Mix entire bottle (50ml) of contast with 600ml (20 ounces) of water and drink half 2 hrs prior to CT scan and half 1 hr prior to scan    Endometrial cancer (H)       Lancet Device Misc      As Directed        Melatonin 5 MG Tbdp      Take 2.5-5 mg by mouth nightly as needed        metFORMIN 500 MG tablet    GLUCOPHAGE     Take 500 mg by mouth daily (with dinner)        mupirocin 2 % ointment    BACTROBAN     Apply to affected area 2 times a day as needed for other (Specify) (rash on back of neck)        SILVRSTAT WOUND DRESSING Gel     85.05 g    Externally apply topically 2 times daily    Skin desquamation

## 2017-10-23 NOTE — PROGRESS NOTES
FOLLOW-UP VISIT    Patient Name: Mary Anglin      : 1970     Age: 47 year old        ______________________________________________________________________________     Chief Complaint   Patient presents with     Cancer     Follow up for Pelvis 4500 cGy completed on 17, Brachytherapy x 3 1800 cGy completed on 17, HRCTV x 2 7000 cGy completed on 17     /79  Pulse 68  Wt 122.9 kg (271 lb)  SpO2 97%  BMI 46.52 kg/m2     Date Radiation Completed: 17    Pain  Denies    Labs  Other Labs: Yes: 10/23/17    Imaging  CT: 10/23/17        Diarrhea: 1- Abdominal cramping: two or less soft or liquid bowel movements per day    Constipation: 0- None    Nausea: 0- None    Vomitin- No vomiting    Genitourinary system: 0- Normal    Dysuria: 2- Moderate    Vaginal dilator use: NOT USING      Other Appointments:     MD Name:  Appointment Date:    MD Name: Appointment Date:   MD Name: Appointment Date:   Other Appointment Notes:     Residual Radiation side effect: DIARRHEA,PAINFUL URINATION     Additional Instructions:     Nurse face-to-face time: Level 2:  5 min face to face time

## 2017-10-23 NOTE — LETTER
10/23/2017        RE: Mary Anglin  [address]   [place] [address]     Dear Colleague,    Thank you for referring your patient, Mary Anglin, to the [facility]. Please see a copy of my visit note below.                Follow Up Notes on Referred Patient    Date: 10/23/2017       Dr. Emily Cohen MD  No address on file       RE: Mary Anglin  : 1970  VALENTE: 10/23/2017    Dear Dr. Emily Cohen:    Mary Anglin is a 47 year old woman with a diagnosis of recurrent endometrial cancer.     The patient presents today for followup.  She has finished external beam radiation as well as brachytherapy for eventual recurrence of endometrioid endometrial carcinoma.  She still has some occasional diarrhea, loose stools every other day, has some discomfort in the vulvar area.  She has seen my colleague, Dr. Ambriz, and has a urinalysis pending to rule out urinary tract infections, otherwise no B symptoms and is otherwise doing well overall.         Past Medical History:    Past Medical History:   Diagnosis Date     Cellulitis of the lower extremities.      Chronic edema of the lower extremities      Depression      Diabetes (H)      Endometrial cancer (H)      GERD (gastroesophageal reflux disease)      HLD (hyperlipidemia)      Morbid obesity (H)      MRSA (methicillin resistant Staphylococcus aureus) infection      Vertigo          Past Surgical History:    Past Surgical History:   Procedure Laterality Date     C TOTAL ABDOM HYSTERECTOMY       COLONOSCOPY       HERNIORRHAPHY UMBILICAL       INSERT RADIATION SEED MARKER CERVIX N/A 2017    Procedure: INSERT RADIATION SEED MARKER CERVIX;  Pelvic Exam, Insertion Radiation Gold Seed Markers;  Surgeon: Jarrod Kessler MD;  Location: [facility] OR     SALPINGO-OOPHORECTOMY BILATERAL       TONSILLECTOMY       UPPER GI ENDOSCOPY           Health Maintenance Due   Topic Date Due     TETANUS IMMUNIZATION (SYSTEM ASSIGNED)   02/22/1987     PAP SCREENING Q3 YR (SYSTEM ASSIGNED)  02/22/1990     LIPID SCREEN Q5 YR FEMALE (SYSTEM ASSIGNED)  02/22/2014     INFLUENZA VACCINE (SYSTEM ASSIGNED)  09/01/2017       Current Medications:     Current Outpatient Prescriptions   Medication Sig Dispense Refill     Silver (SILVRSTAT WOUND DRESSING) GEL Externally apply topically 2 times daily 85.05 g 1     loperamide (ANTI-DIARRHEAL) 2 MG capsule Take 2 mg by mouth as needed for diarrhea       clotrimazole (LOTRIMIN) 1 % cream Apply topically 2 times daily 40 g 1     atorvastatin (LIPITOR) 10 MG tablet Take 0.5 tablets (5 mg total) by mouth every night at bedtime.       blood glucose monitoring (ACCU-CHEK BRADLEY PLUS) meter device kit 1 Device       blood glucose monitoring (ACCU-CHEK BRADLEY PLUS) test strip 1 time per day       clobetasol (TEMOVATE) 0.05 % cream apply topically PRN       docusate sodium (COLACE) 100 MG capsule Take 100 mg by mouth 2 times daily as needed        doxepin (SINEQUAN) 10 MG capsule Take 20 mg by mouth At Bedtime        escitalopram (LEXAPRO) 20 MG tablet Take 20 mg by mouth At Bedtime        furosemide (LASIX) 40 MG tablet Take 40 mg by mouth 2 times daily        Lancet Device MISC As Directed       Melatonin 5 MG TBDP Take 2.5-5 mg by mouth nightly as needed        metFORMIN (GLUCOPHAGE) 500 MG tablet Take 500 mg by mouth daily (with dinner)        mupirocin (BACTROBAN) 2 % ointment Apply to affected area 2 times a day as needed for other (Specify) (rash on back of neck)       iohexol (OMNIPAQUE) 140 MG/ML SOLN solution Mix entire bottle (50ml) of contast with 600ml (20 ounces) of water and drink half 2 hrs prior to CT scan and half 1 hr prior to scan (Patient not taking: Reported on 10/23/2017) 50 mL 0     albuterol (PROAIR HFA/PROVENTIL HFA/VENTOLIN HFA) 108 (90 BASE) MCG/ACT Inhaler Inhale 2 puffs into the lungs every 4 hours as needed            Allergies:        Allergies   Allergen Reactions     Penicillin G Other (See  "Comments)     Azithromycin Rash     Benzonatate Unknown     Cephalexin Unknown     Sulfamethoxazole W/Trimethoprim Rash     Bupropion Other (See Comments)     No rash     Clindamycin Other (See Comments)     Aspirin Other (See Comments)     Pt refuses to take ASA        Social History:     Social History   Substance Use Topics     Smoking status: Never Smoker     Smokeless tobacco: Never Used     Alcohol use No       History   Drug Use No         Family History:       Family History   Problem Relation Age of Onset     Skin Cancer Father      KIDNEY DISEASE Father      Required hemodialysis     HEART DISEASE Maternal Grandfather      MENTAL ILLNESS Mother      Catatonia after loss of mother     Breast Cancer Paternal Grandmother      CEREBROVASCULAR DISEASE Maternal Grandmother      Hypertension Maternal Grandmother      DIABETES Maternal Grandmother      DIABETES Paternal Grandfather          Physical Exam:     /79  Pulse 68  Temp 98.5  F (36.9  C) (Oral)  Resp 17  Ht 1.626 m (5' 4\")  Wt 122.9 kg (271 lb)  SpO2 97%  BMI 46.52 kg/m2  Body mass index is 46.52 kg/(m^2).    General Appearance: healthy and alert, no distress     ABDOMEN:  Soft, nontender, nondistended, no organomegaly.   PELVIC:  External genitalia, patient has some radiation change on external genitalia.  No signs of infection.  Speculum exam limited due to patient discomfort.  Pelvic exam does not reveal any more lesions in the vaginal mucosa.  Prior nodule on anterior vaginal wall has disappeared.  No adnexal masses or tenderness.  Rectovaginal confirms.             Assessment:    Mary Anglin is a 47 year old woman with a diagnosis of recurrent endometrial cancer.     A total of 30 minutes was spent with the patient, 25 minutes of which were spent in counseling the patient and/or treatment planning.    1.  Recurrent endometrial carcinoma.   2.  Vulvar erythema.   3.  Class 3 obesity.      Discussed with the patient, PET scan today " did not reveal any evidence of disease.  Will have another followup scan with a PET scan in like 3 months to rule out any persistent or new disease.  I will give her to use lidocaine gel for the discomfort with erythema around her vulva.  Continue followup every 3-4 months surveillance for the first 2 years and then every 6 months the next 3 years and then yearly.  The patient agrees with this plan, is very appreciative of her care.  All questions were answered.       Abiodun Gore MD, MS    Department of Obstetrics and Gynecology   Division of Gynecologic Oncology   [facility]  Phone: [phone]      CC  Patient Care Team:  Mikel Wells MD as PCP - General (Family Practice)  Chris Roberson as Referring Physician (OB/Gyn)

## 2017-10-23 NOTE — PROGRESS NOTES
[facility], [place]  Radiation Oncology Follow-up Note  2017    Mary Anglin   MRN: [id number]  : 1970     DISEASE TREATED: Recurrent endometrial adenocarcinoma     INTERVAL SINCE COMPLETION OF RADIATION THERAPY: 1 month; completed treatment on 17.     TYPE OF RADIATION THERAPY ADMINISTERED: 4500 cGy in 25 fractions to pelvis + 2800 cGy in 5 fractions intracavitary brachytherapy boost     SUBJECTIVE: Ms. Anglin is a 47 year old female with diagnosis of recurrent endometrial cancer confined to the vagina. She was originally diagnosed with grade 1 endometrial cancer, FIGO stage IB in . She underwent JEMAL/BSO/LND with no adjuvant therapy. In 2017  she presented with vaginal bleeding and was found to have a 1.5 to 2cm friable mass along the anterior vaginal wall, in close proximity to the introitus. This was biopsied and positive for recurrent endometrial adenocarcinoma, grade unspecified No additional disease was identified on restaging scans. She completed a course of external beam radiation therapy to the pelvis,receiving   4500 cGy in 25 fractions. A pelvic MRI completed after EBRTshowed that the previous nodularity along the anterior vaginal wall had almost completely resolved. There was some residual signal change, which was indeterminant and felt to potentially represent treatment related changes. Given the degree of response to her external beam treatment, we felt that she would be a candidate for intracavitary brachytherapy with a multichannel vaginal cylinder as opposed to interstitial brachytherapy. She received 1800 cGy in 3 fractions prescribed to the vaginal surface to an active length of 7.5 cm. this was followed by a boost of 1000 cGy in 2 fractions to an HRCTV, which was determined from her initial imaging as well as a gold fiducial marker that was placed prior to external beam radiation therapy. Overall, she tolerated the treatment  with expected local toxicities including grade 2 skin reaction, grade 1 diarrhea, and grade 1 fatigue. Her skin reaction was confined   to the groin and beneath the pannus. This was managed with Silvrstat and mepilex. She returns to clinic today for a routine follow up visit 1 month after completing RT.    On exam today, Ms. Anglin reports that she is still recovering from some of the acute side effects of treatment. She continues to have occasional diarrhea consisting of a couple episodes of loose stools every 2-3 days, however this is improved since treatment. She also complains of persistent dysuria since the time of brachytherapy which has fluctuated in severity. She has some associated nausea but no fevers or other systemic symptoms. She reports that she has not been using her dilator as a result of the dysuria. She did have a significant skin reaction during treatment but this has healed completely at this time. She continues to endorse some fatigue but this is improving. She has an appointment later today with Dr. Gore.     OBJECTIVE:   /79  Pulse 68  Wt 122.9 kg (271 lb)  SpO2 97%  BMI 46.52 kg/m2  GENERAL:  Appears well, in no acute distress.   HEENT: NCAT  RESP: Breathing comfortably on RA  CV: WWP  PELVIS: Erythema of vaginal mucosa and perineum  SKIN: No residual skin reaction in intratriginous area of pannus or groin folds  NEURO: CNs grossly intact       IMPRESSION: Recurrent endometrial adenocarcinoma of the anterior vaginal wall. She is now 1 month s/p salvage RT with resolving acute toxicity.      RECOMMENDATIONS:   1. UA today  2. Gyn onc follow up with Dr. Gore today  3. RTC in 3 months (at same time as gyn onc)      The patient was seen and discussed with staff, Dr. Ambriz.    Lazaro Lind MD  Resident, PGY-3  Department of Radiation Oncology  [facility]  [phone]    I saw the patient with the resident.  I agree with the resident note and plan  of care.      Hailey Ambriz MD

## 2018-01-26 ENCOUNTER — RADIANT APPOINTMENT (OUTPATIENT)
Dept: PET IMAGING | Facility: CLINIC | Age: 48
End: 2018-01-26
Attending: OBSTETRICS & GYNECOLOGY
Payer: MEDICAID

## 2018-01-26 DIAGNOSIS — C54.1 ENDOMETRIAL CANCER (H): ICD-10-CM

## 2018-01-26 LAB
CREAT BLD-MCNC: 0.8 MG/DL (ref 0.5–1.2)
GFR SERPL CREATININE-BSD FRML MDRD: NORMAL ML/MIN/{1.73_M2}
GFRB: NORMAL
GLUCOSE SERPL-MCNC: 103 MG/DL (ref 70–99)

## 2018-01-26 RX ORDER — FUROSEMIDE 10 MG/ML
40 INJECTION INTRAMUSCULAR; INTRAVENOUS ONCE
Status: COMPLETED | OUTPATIENT
Start: 2018-01-26 | End: 2018-01-26

## 2018-01-26 RX ADMIN — FUROSEMIDE 40 MG: 10 INJECTION INTRAMUSCULAR; INTRAVENOUS at 11:01

## 2018-01-29 ENCOUNTER — ONCOLOGY VISIT (OUTPATIENT)
Dept: ONCOLOGY | Facility: CLINIC | Age: 48
End: 2018-01-29
Attending: OBSTETRICS & GYNECOLOGY
Payer: MEDICAID

## 2018-01-29 VITALS
SYSTOLIC BLOOD PRESSURE: 121 MMHG | DIASTOLIC BLOOD PRESSURE: 76 MMHG | HEART RATE: 83 BPM | BODY MASS INDEX: 46 KG/M2 | TEMPERATURE: 99.6 F | RESPIRATION RATE: 18 BRPM | WEIGHT: 268 LBS | OXYGEN SATURATION: 95 %

## 2018-01-29 DIAGNOSIS — C54.1 ENDOMETRIAL CANCER (H): Primary | ICD-10-CM

## 2018-01-29 PROCEDURE — 40000114 ZZH STATISTIC NO CHARGE CLINIC VISIT

## 2018-01-29 PROCEDURE — 99214 OFFICE O/P EST MOD 30 MIN: CPT | Mod: ZP | Performed by: OBSTETRICS & GYNECOLOGY

## 2018-01-29 ASSESSMENT — PAIN SCALES - GENERAL: PAINLEVEL: NO PAIN (0)

## 2018-01-29 NOTE — MR AVS SNAPSHOT
"              After Visit Summary   1/29/2018    Mary Anglin    MRN: [id number]           Patient Information     Date Of Birth          1970        Visit Information        Provider Department      1/29/2018 5:00 PM Abiodun Gore MD [facility]        Today's Diagnoses     Endometrial cancer (H)    -  1       Follow-ups after your visit        Your next 10 appointments already scheduled     May 07, 2018  4:40 PM CDT   (Arrive by 4:25 PM)   Return Visit with ES Leary CNP   [facility] [place] [facility] ([facility])    [address] [place] [address]  [address]  [facility] [address] [phone]              Who to contact     If you have questions or need follow up information about today's clinic visit or your schedule please contact [facility] directly at [phone].  Normal or non-critical lab and imaging results will be communicated to you by MyChart, letter or phone within 4 business days after the clinic has received the results. If you do not hear from us within 7 days, please contact the clinic through [employer] or phone. If you have a critical or abnormal lab result, we will notify you by phone as soon as possible.  Submit refill requests through [employer] or call your pharmacy and they will forward the refill request to us. Please allow 3 business days for your refill to be completed.          Additional Information About Your Visit        MyChart Information     [employer] lets you send messages to your doctor, view your test results, renew your prescriptions, schedule appointments and more. To sign up, go to [email or web address] . Click on \"Log in\" on the left side of the screen, which will take you to the Welcome page. Then click on \"Sign up Now\" on the right side of the page.     You will be asked to enter the access code listed below, as well as some personal information. Please follow the directions to create " your username and password.     Your access code is: [email or web address]  Expires: 2018  6:30 AM     Your access code will  in 90 days. If you need help or a new code, please call your [facility] or [phone].        Care EveryWhere ID     This is your Care EveryWhere ID. This could be used by other organizations to access your [place] medical records  [id number]        Your Vitals Were     Pulse Temperature Respirations Pulse Oximetry BMI (Body Mass Index)       83 99.6  F (37.6  C) (Oral) 18 95% 46 kg/m2        Blood Pressure from Last 3 Encounters:   18 121/73   18 121/76   10/23/17 130/79    Weight from Last 3 Encounters:   18 121.6 kg (268 lb)   18 121.6 kg (268 lb)   10/23/17 122.9 kg (271 lb)              Today, you had the following     No orders found for display       Primary Care Provider Office Phone # Fax #    Mikel Wells [phone]       [place] [address]        Equal Access to Services     ALEX ROSAS AH: Hadii peggy vazquezo Socollinsali, waaxda luqadaha, qaybta kaalmada adeegyada, loraine harris. So [facility] [phone].    ATENCIÓN: Si habla español, tiene a do disposición servicios gratuitos de asistencia lingüística. Llame al [phone].    We comply with applicable federal civil rights laws and [place] laws. We do not discriminate on the basis of race, color, national origin, age, disability, sex, sexual orientation, or gender identity.            Thank you!     Thank you for choosing [facility]  for your care. Our goal is always to provide you with excellent care. Hearing back from our patients is one way we can continue to improve our services. Please take a few minutes to complete the written survey that you may receive in the mail after your visit with us. Thank you!             Your Updated Medication List - Protect others around you: Learn how  to safely use, store and throw away your medicines at www.disposemymeds.org.          This list is accurate as of 1/29/18 11:59 PM.  Always use your most recent med list.                   Brand Name Dispense Instructions for use Diagnosis    ACCU-CHEK BRADLEY PLUS test strip   Generic drug:  blood glucose monitoring      1 time per day        albuterol 108 (90 BASE) MCG/ACT Inhaler    PROAIR HFA/PROVENTIL HFA/VENTOLIN HFA     Inhale 2 puffs into the lungs every 4 hours as needed        ANTI-DIARRHEAL 2 MG capsule   Generic drug:  loperamide      Take 2 mg by mouth as needed for diarrhea        atorvastatin 10 MG tablet    LIPITOR     Take 0.5 tablets (5 mg total) by mouth every night at bedtime.        blood glucose monitoring meter device kit      1 Device        clobetasol 0.05 % cream    TEMOVATE     apply topically PRN        clotrimazole 1 % cream    LOTRIMIN    40 g    Apply topically 2 times daily    Fungal infection of skin, Endometrial cancer (H)       docusate sodium 100 MG capsule    COLACE     Take 100 mg by mouth 2 times daily as needed        doxepin 10 MG capsule    SINEquan     Take 20 mg by mouth At Bedtime        escitalopram 20 MG tablet    LEXAPRO     Take 20 mg by mouth At Bedtime        furosemide 40 MG tablet    LASIX     Take 40 mg by mouth 2 times daily        iohexol 140 MG/ML Soln solution    OMNIPAQUE    50 mL    Mix entire bottle (50ml) of contast with 600ml (20 ounces) of water and drink half 2 hrs prior to CT scan and half 1 hr prior to scan    Endometrial cancer (H)       Lancet Device Misc      As Directed        Melatonin 5 MG Tbdp      Take 2.5-5 mg by mouth nightly as needed        metFORMIN 500 MG tablet    GLUCOPHAGE     Take 500 mg by mouth daily (with dinner)        mupirocin 2 % ointment    BACTROBAN     Apply to affected area 2 times a day as needed for other (Specify) (rash on back of neck)        SILVRSTAT WOUND DRESSING Gel     85.05 g    Externally apply topically 2 times  daily    Skin desquamation

## 2018-01-29 NOTE — LETTER
2018       RE: Mary Anglin  [address]   [facility] [address]     Dear Colleague,    Thank you for referring your patient, Mary Anglin, to the [facility]. Please see a copy of my visit note below.                Follow Up Notes on Referred Patient    Date: 2018       Dr. Mikel Wells MD  [facility]  [address] [place] [address]       RE: Mary Anglin  : 1970  VALENTE: 2018    Dear Dr. Mikel Wells:    Mary Anglin is a 47 year old woman with a diagnosis of  recurrent endometrial cancer.     Patient presents today for followup.  She has been doing well.  She is just getting over an upper respiratory infection, but other than that she has been doing well.  No nausea, vomiting, fever or chills.  Normal urinary and bowel functions.  No vaginal bleeding.  No B symptoms.           Past Medical History:    Past Medical History:   Diagnosis Date     Cellulitis of the lower extremities.      Chronic edema of the lower extremities      Depression      Diabetes (H)      Endometrial cancer (H)      GERD (gastroesophageal reflux disease)      HLD (hyperlipidemia)      Morbid obesity (H)      MRSA (methicillin resistant Staphylococcus aureus) infection      Vertigo          Past Surgical History:    Past Surgical History:   Procedure Laterality Date     C TOTAL ABDOM HYSTERECTOMY       COLONOSCOPY       HERNIORRHAPHY UMBILICAL       INSERT RADIATION SEED MARKER CERVIX N/A 2017    Procedure: INSERT RADIATION SEED MARKER CERVIX;  Pelvic Exam, Insertion Radiation Gold Seed Markers;  Surgeon: Jarrod Kessler MD;  Location: [facility] OR     SALPINGO-OOPHORECTOMY BILATERAL       TONSILLECTOMY       UPPER GI ENDOSCOPY           Health Maintenance Due   Topic Date Due     TETANUS IMMUNIZATION (SYSTEM ASSIGNED)  1987     PAP SCREENING Q3 YR (SYSTEM ASSIGNED)  1990     LIPID SCREEN Q5 YR FEMALE (SYSTEM ASSIGNED)   02/22/2014     INFLUENZA VACCINE (SYSTEM ASSIGNED)  09/01/2017       Current Medications:     Current Outpatient Prescriptions   Medication Sig Dispense Refill     loperamide (ANTI-DIARRHEAL) 2 MG capsule Take 2 mg by mouth as needed for diarrhea       clotrimazole (LOTRIMIN) 1 % cream Apply topically 2 times daily 40 g 1     albuterol (PROAIR HFA/PROVENTIL HFA/VENTOLIN HFA) 108 (90 BASE) MCG/ACT Inhaler Inhale 2 puffs into the lungs every 4 hours as needed        atorvastatin (LIPITOR) 10 MG tablet Take 0.5 tablets (5 mg total) by mouth every night at bedtime.       blood glucose monitoring (ACCU-CHEK BRADLEY PLUS) meter device kit 1 Device       blood glucose monitoring (ACCU-CHEK BRADLEY PLUS) test strip 1 time per day       clobetasol (TEMOVATE) 0.05 % cream apply topically PRN       docusate sodium (COLACE) 100 MG capsule Take 100 mg by mouth 2 times daily as needed        doxepin (SINEQUAN) 10 MG capsule Take 20 mg by mouth At Bedtime        escitalopram (LEXAPRO) 20 MG tablet Take 20 mg by mouth At Bedtime        furosemide (LASIX) 40 MG tablet Take 40 mg by mouth 2 times daily        Lancet Device MISC As Directed       Melatonin 5 MG TBDP Take 2.5-5 mg by mouth nightly as needed        metFORMIN (GLUCOPHAGE) 500 MG tablet Take 500 mg by mouth daily (with dinner)        iohexol (OMNIPAQUE) 140 MG/ML SOLN solution Mix entire bottle (50ml) of contast with 600ml (20 ounces) of water and drink half 2 hrs prior to CT scan and half 1 hr prior to scan (Patient not taking: Reported on 1/29/2018) 50 mL 0     Silver (SILVRSTAT WOUND DRESSING) GEL Externally apply topically 2 times daily (Patient not taking: Reported on 1/29/2018) 85.05 g 1     mupirocin (BACTROBAN) 2 % ointment Apply to affected area 2 times a day as needed for other (Specify) (rash on back of neck)           Allergies:        Allergies   Allergen Reactions     Penicillin G Other (See Comments)     Azithromycin Rash     Benzonatate Unknown     Cephalexin  Unknown     Sulfamethoxazole W/Trimethoprim Rash     Bupropion Other (See Comments)     No rash     Clindamycin Other (See Comments)     Aspirin Other (See Comments)     Pt refuses to take ASA        Social History:     Social History   Substance Use Topics     Smoking status: Never Smoker     Smokeless tobacco: Never Used     Alcohol use No       History   Drug Use No         Family History:     Family History   Problem Relation Age of Onset     Skin Cancer Father      KIDNEY DISEASE Father      Required hemodialysis     HEART DISEASE Maternal Grandfather      MENTAL ILLNESS Mother      Catatonia after loss of mother     Breast Cancer Paternal Grandmother      CEREBROVASCULAR DISEASE Maternal Grandmother      Hypertension Maternal Grandmother      DIABETES Maternal Grandmother      DIABETES Paternal Grandfather          Physical Exam:     /76  Pulse 83  Temp 99.6  F (37.6  C) (Oral)  Resp 18  Wt 121.6 kg (268 lb)  SpO2 95%  BMI 46 kg/m2  Body mass index is 46 kg/(m^2).    General Appearance: healthy and alert, no distress    Musculoskeletal: extremities non tender and without edema    Skin: no lesions or rashes or lesions back side of right arm    Neurological: normal gait, no gross defects     Psychiatric: appropriate mood and affect                               Hematological: normal cervical, supraclavicular and inguinal lymph nodes     ABDOMEN:  Obese.  No organomegaly.   PELVIC:  Normal-appearing external genitalia.  Shortened atrophic vagina.  No adnexal masses or tenderness.  Rectovaginal confirms.     EXTREMITIES:  Also, on exam, again, no lesion but on the back of her right arm there is some borderline PET scan result.  Does have the rash there, but no discrete lesion.             Assessment:    Mary Anglin is a 47 year old woman with a diagnosis of recurrent endometrial cancer.     A total of 30 minutes was spent with the patient, 20 minutes of which were spent in counseling the patient  and/or treatment planning.    1.  Recurrent endometrial cancer, Patient has had definitive radiation treatment for recurrent endometrial cancer.   2.  No evidence of disease.   3.  Indeterminate lesion on back of right arm on PET scan.    4.  Class 3 obesity.      Discussed with the patient we will see her back in 3-4 months and does not need any further scan.  We will clinically follow for any lesion on the back of her right arm, but there is nothing distinct seen.  Other than that, we will follow clinically, and again she will be coming back in 3-4 months for surveillance visit.  Also discussed to keep using vaginal dilator, as she has started having some vaginal atrophy, shortening and early stenosis.  The patient agrees with the plan, is very appreciative of her care.  All questions were answered.                 Abiodun Gore MD, MS    Department of Obstetrics and Gynecology   Division of Gynecologic Oncology   [facility]  Phone: [phone]        CC  Patient Care Team:  Mikel Wells MD as PCP - General (Family Practice)  Chris Roberson as Referring Physician (OB/Gyn)

## 2018-01-29 NOTE — PROGRESS NOTES
Follow Up Notes on Referred Patient    Date: 2018       Dr. Mikel Wells MD  [facility]  [address] [place] [address]       RE: Mary Anglin  : 1970  VALENTE: 2018    Dear Dr. Mikel Wells:    Mary Anglin is a 47 year old woman with a diagnosis of  recurrent endometrial cancer.     Patient presents today for followup.  She has been doing well.  She is just getting over an upper respiratory infection, but other than that she has been doing well.  No nausea, vomiting, fever or chills.  Normal urinary and bowel functions.  No vaginal bleeding.  No B symptoms.           Past Medical History:    Past Medical History:   Diagnosis Date     Cellulitis of the lower extremities.      Chronic edema of the lower extremities      Depression      Diabetes (H)      Endometrial cancer (H)      GERD (gastroesophageal reflux disease)      HLD (hyperlipidemia)      Morbid obesity (H)      MRSA (methicillin resistant Staphylococcus aureus) infection      Vertigo          Past Surgical History:    Past Surgical History:   Procedure Laterality Date     C TOTAL ABDOM HYSTERECTOMY       COLONOSCOPY       HERNIORRHAPHY UMBILICAL       INSERT RADIATION SEED MARKER CERVIX N/A 2017    Procedure: INSERT RADIATION SEED MARKER CERVIX;  Pelvic Exam, Insertion Radiation Gold Seed Markers;  Surgeon: Jarrod Kessler MD;  Location: [facility] OR     SALPINGO-OOPHORECTOMY BILATERAL       TONSILLECTOMY       UPPER GI ENDOSCOPY           Health Maintenance Due   Topic Date Due     TETANUS IMMUNIZATION (SYSTEM ASSIGNED)  1987     PAP SCREENING Q3 YR (SYSTEM ASSIGNED)  1990     LIPID SCREEN Q5 YR FEMALE (SYSTEM ASSIGNED)  2014     INFLUENZA VACCINE (SYSTEM ASSIGNED)  2017       Current Medications:     Current Outpatient Prescriptions   Medication Sig Dispense Refill     loperamide (ANTI-DIARRHEAL) 2 MG capsule Take 2 mg by mouth as needed for diarrhea        clotrimazole (LOTRIMIN) 1 % cream Apply topically 2 times daily 40 g 1     albuterol (PROAIR HFA/PROVENTIL HFA/VENTOLIN HFA) 108 (90 BASE) MCG/ACT Inhaler Inhale 2 puffs into the lungs every 4 hours as needed        atorvastatin (LIPITOR) 10 MG tablet Take 0.5 tablets (5 mg total) by mouth every night at bedtime.       blood glucose monitoring (ACCU-CHEK BRADLEY PLUS) meter device kit 1 Device       blood glucose monitoring (ACCU-CHEK BRADLEY PLUS) test strip 1 time per day       clobetasol (TEMOVATE) 0.05 % cream apply topically PRN       docusate sodium (COLACE) 100 MG capsule Take 100 mg by mouth 2 times daily as needed        doxepin (SINEQUAN) 10 MG capsule Take 20 mg by mouth At Bedtime        escitalopram (LEXAPRO) 20 MG tablet Take 20 mg by mouth At Bedtime        furosemide (LASIX) 40 MG tablet Take 40 mg by mouth 2 times daily        Lancet Device MISC As Directed       Melatonin 5 MG TBDP Take 2.5-5 mg by mouth nightly as needed        metFORMIN (GLUCOPHAGE) 500 MG tablet Take 500 mg by mouth daily (with dinner)        iohexol (OMNIPAQUE) 140 MG/ML SOLN solution Mix entire bottle (50ml) of contast with 600ml (20 ounces) of water and drink half 2 hrs prior to CT scan and half 1 hr prior to scan (Patient not taking: Reported on 1/29/2018) 50 mL 0     Silver (SILVRSTAT WOUND DRESSING) GEL Externally apply topically 2 times daily (Patient not taking: Reported on 1/29/2018) 85.05 g 1     mupirocin (BACTROBAN) 2 % ointment Apply to affected area 2 times a day as needed for other (Specify) (rash on back of neck)           Allergies:        Allergies   Allergen Reactions     Penicillin G Other (See Comments)     Azithromycin Rash     Benzonatate Unknown     Cephalexin Unknown     Sulfamethoxazole W/Trimethoprim Rash     Bupropion Other (See Comments)     No rash     Clindamycin Other (See Comments)     Aspirin Other (See Comments)     Pt refuses to take ASA        Social History:     Social History   Substance Use  Topics     Smoking status: Never Smoker     Smokeless tobacco: Never Used     Alcohol use No       History   Drug Use No         Family History:     Family History   Problem Relation Age of Onset     Skin Cancer Father      KIDNEY DISEASE Father      Required hemodialysis     HEART DISEASE Maternal Grandfather      MENTAL ILLNESS Mother      Catatonia after loss of mother     Breast Cancer Paternal Grandmother      CEREBROVASCULAR DISEASE Maternal Grandmother      Hypertension Maternal Grandmother      DIABETES Maternal Grandmother      DIABETES Paternal Grandfather          Physical Exam:     /76  Pulse 83  Temp 99.6  F (37.6  C) (Oral)  Resp 18  Wt 121.6 kg (268 lb)  SpO2 95%  BMI 46 kg/m2  Body mass index is 46 kg/(m^2).    General Appearance: healthy and alert, no distress    Musculoskeletal: extremities non tender and without edema    Skin: no lesions or rashes or lesions back side of right arm    Neurological: normal gait, no gross defects     Psychiatric: appropriate mood and affect                               Hematological: normal cervical, supraclavicular and inguinal lymph nodes     ABDOMEN:  Obese.  No organomegaly.   PELVIC:  Normal-appearing external genitalia.  Shortened atrophic vagina.  No adnexal masses or tenderness.  Rectovaginal confirms.     EXTREMITIES:  Also, on exam, again, no lesion but on the back of her right arm there is some borderline PET scan result.  Does have the rash there, but no discrete lesion.             Assessment:    Mary Anglin is a 47 year old woman with a diagnosis of recurrent endometrial cancer.     A total of 30 minutes was spent with the patient, 20 minutes of which were spent in counseling the patient and/or treatment planning.    1.  Recurrent endometrial cancer, Patient has had definitive radiation treatment for recurrent endometrial cancer.   2.  No evidence of disease.   3.  Indeterminate lesion on back of right arm on PET scan.    4.  Class 3  obesity.      Discussed with the patient we will see her back in 3-4 months and does not need any further scan.  We will clinically follow for any lesion on the back of her right arm, but there is nothing distinct seen.  Other than that, we will follow clinically, and again she will be coming back in 3-4 months for surveillance visit.  Also discussed to keep using vaginal dilator, as she has started having some vaginal atrophy, shortening and early stenosis.  The patient agrees with the plan, is very appreciative of her care.  All questions were answered.                 Abiodun Gore MD, MS    Department of Obstetrics and Gynecology   Division of Gynecologic Oncology   [facility]  Phone: [phone]        CC  Patient Care Team:  Maryse Marti MD as PCP - General (Family Practice)  Chris Roberson as Referring Physician (OB/Gyn)  MARYSE MARTI

## 2018-01-30 ENCOUNTER — OFFICE VISIT (OUTPATIENT)
Dept: RADIATION ONCOLOGY | Facility: CLINIC | Age: 48
End: 2018-01-30
Attending: RADIOLOGY
Payer: MEDICAID

## 2018-01-30 VITALS
HEART RATE: 94 BPM | DIASTOLIC BLOOD PRESSURE: 73 MMHG | WEIGHT: 268 LBS | BODY MASS INDEX: 46 KG/M2 | OXYGEN SATURATION: 95 % | SYSTOLIC BLOOD PRESSURE: 121 MMHG

## 2018-01-30 DIAGNOSIS — C54.1 ENDOMETRIAL CANCER (H): Primary | ICD-10-CM

## 2018-01-30 PROCEDURE — G0463 HOSPITAL OUTPT CLINIC VISIT: HCPCS | Performed by: RADIOLOGY

## 2018-01-30 ASSESSMENT — PAIN SCALES - GENERAL: PAINLEVEL: NO PAIN (0)

## 2018-01-30 NOTE — MR AVS SNAPSHOT
After Visit Summary   2018    Mary Anglin    MRN: [id number]           Patient Information     Date Of Birth          1970        Visit Information        Provider Department      2018 11:30 AM Hailey Ambriz MD Rad[facility]        Today's Diagnoses     Endometrial cancer (H)    -  1       Follow-ups after your visit        Your next 10 appointments already scheduled     May 07, 2018  4:40 PM CDT   (Arrive by 4:25 PM)   Return Visit with ES Leary [facility] ([facility])    [address] [place] [address]  [address] [place] [address] [phone]              Who to contact     Please call your clinic at [phone] to:    Ask questions about your health    Make or cancel appointments    Discuss your medicines    Learn about your test results    Speak to your doctor   If you have compliments or concerns about an experience at your clinic, or if you wish to file a complaint, please contact [facility] Patient Relations at [phone] or email us at [email or web address]         Additional Information About Your Visit        MyChart Information     Accerat is an electronic gateway that provides easy, online access to your medical records. With dotHIV, you can request a clinic appointment, read your test results, renew a prescription or communicate with your care team.     To sign up for Accerat visit the website at [email or web address]   You will be asked to enter the access code listed below, as well as some personal information. Please follow the directions to create your username and password.     Your access code is: [email or web address]  Expires: 2018  6:30 AM     Your access code will  in 90 days. If you need help or a new code, please contact your [facility] or call [phone] for assistance.        Care EveryWhere  ID     This is your Care EveryWhere ID. This could be used by other organizations to access your [place] medical records  [id number]        Your Vitals Were     Pulse Pulse Oximetry BMI (Body Mass Index)             94 95% 46 kg/m2          Blood Pressure from Last 3 Encounters:   01/30/18 121/73   01/29/18 121/76   10/23/17 130/79    Weight from Last 3 Encounters:   01/30/18 121.6 kg (268 lb)   01/29/18 121.6 kg (268 lb)   10/23/17 122.9 kg (271 lb)              Today, you had the following     No orders found for display       Primary Care Provider Office Phone # Fax #    Mikel Wells [phone]       [facility] [address] [facility] [address]        Equal Access to Services     ALEX ROSAS : Hadii peggy vazquezo Sourban, waaxda luqadaha, qaybta kaalmada cecy, loraine turner . So [facility] [phone].    ATENCIÓN: Si habla español, tiene a do disposición servicios gratuitos de asistencia lingüística. Gregor al [phone].    We comply with applicable federal civil rights laws and [place] laws. We do not discriminate on the basis of race, color, national origin, age, disability, sex, sexual orientation, or gender identity.            Thank you!     Thank you for choosing [facility]  for your care. Our goal is always to provide you with excellent care. Hearing back from our patients is one way we can continue to improve our services. Please take a few minutes to complete the written survey that you may receive in the mail after your visit with us. Thank you!             Your Updated Medication List - Protect others around you: Learn how to safely use, store and throw away your medicines at www.disposemymeds.org.          This list is accurate as of 1/30/18 11:59 PM.  Always use your most recent med list.                   Brand Name Dispense Instructions for use Diagnosis    ACCU-CHEK BRADLEY PLUS test strip   Generic drug:   blood glucose monitoring      1 time per day        albuterol 108 (90 BASE) MCG/ACT Inhaler    PROAIR HFA/PROVENTIL HFA/VENTOLIN HFA     Inhale 2 puffs into the lungs every 4 hours as needed        ANTI-DIARRHEAL 2 MG capsule   Generic drug:  loperamide      Take 2 mg by mouth as needed for diarrhea        atorvastatin 10 MG tablet    LIPITOR     Take 0.5 tablets (5 mg total) by mouth every night at bedtime.        blood glucose monitoring meter device kit      1 Device        clobetasol 0.05 % cream    TEMOVATE     apply topically PRN        clotrimazole 1 % cream    LOTRIMIN    40 g    Apply topically 2 times daily    Fungal infection of skin, Endometrial cancer (H)       docusate sodium 100 MG capsule    COLACE     Take 100 mg by mouth 2 times daily as needed        doxepin 10 MG capsule    SINEquan     Take 20 mg by mouth At Bedtime        escitalopram 20 MG tablet    LEXAPRO     Take 20 mg by mouth At Bedtime        furosemide 40 MG tablet    LASIX     Take 40 mg by mouth 2 times daily        iohexol 140 MG/ML Soln solution    OMNIPAQUE    50 mL    Mix entire bottle (50ml) of contast with 600ml (20 ounces) of water and drink half 2 hrs prior to CT scan and half 1 hr prior to scan    Endometrial cancer (H)       Lancet Device Misc      As Directed        Melatonin 5 MG Tbdp      Take 2.5-5 mg by mouth nightly as needed        metFORMIN 500 MG tablet    GLUCOPHAGE     Take 500 mg by mouth daily (with dinner)        mupirocin 2 % ointment    BACTROBAN     Apply to affected area 2 times a day as needed for other (Specify) (rash on back of neck)        SILVRSTAT WOUND DRESSING Gel     85.05 g    Externally apply topically 2 times daily    Skin desquamation

## 2018-01-30 NOTE — PROGRESS NOTES
FOLLOW-UP VISIT    Patient Name: Mary Anglin      : 1970     Age: 47 year old        ______________________________________________________________________________     Chief Complaint   Patient presents with     Cancer     Follow up for Pelvis 4500 cGy completed on 17, Brachytherapy x 3 1800 cGy completed on 17, HRCTV x 2 7000 cGy completed on 17     /73  Pulse 94  Wt 121.6 kg (268 lb)  SpO2 95%  BMI 46 kg/m2     Date Radiation Completed: 17    Pain  Denies    Labs  Other Labs: Yes: 18    Imaging  CT: 18        Diarrhea: 0- None    Constipation: 1- Occassional or intermittent s/s; occasional use of stool softners, laxatives, enema     Nausea: 0- None    Vomitin- No vomiting    Genitourinary system: 0- Normal    Dysuria: 1- Mild    Vaginal dilator use: not using     Other Appointments:     MD Name:  Appointment Date:    MD Name: Appointment Date:   MD Name: Appointment Date:   Other Appointment Notes:     Residual Radiation side effect: fatigue     Additional Instructions:     Nurse face-to-face time: Level 2:  5 min face to face time

## 2018-01-30 NOTE — LETTER
2018     RE: Mary Anglin  [address]   [facility] [address]     Dear Colleague,    Thank you for referring your patient, Mary Anglin, to the [facility]. Please see a copy of my visit note below.    FOLLOW-UP VISIT    Patient Name: Mary Anglin      : 1970     Age: 47 year old        ______________________________________________________________________________     Chief Complaint   Patient presents with     Cancer     Follow up for Pelvis 4500 cGy completed on 17, Brachytherapy x 3 1800 cGy completed on 17, HRCTV x 2 7000 cGy completed on 17     /73  Pulse 94  Wt 121.6 kg (268 lb)  SpO2 95%  BMI 46 kg/m2     Date Radiation Completed: 17    Pain  Denies    Labs  Other Labs: Yes: 18    Imaging  CT: 18        Diarrhea: 0- None    Constipation: 1- Occassional or intermittent s/s; occasional use of stool softners, laxatives, enema     Nausea: 0- None    Vomitin- No vomiting    Genitourinary system: 0- Normal    Dysuria: 1- Mild    Vaginal dilator use: not using     Other Appointments:     MD Name:  Appointment Date:    MD Name: Appointment Date:   MD Name: Appointment Date:   Other Appointment Notes:     Residual Radiation side effect: fatigue     Additional Instructions:     Nurse face-to-face time: Level 2:  5 min face to face time          [facility]  Radiation Oncology Follow-up Note  2017    Mary Anglin   MRN: [id number]  : 1970     DISEASE TREATED: Recurrent endometrial adenocarcinoma     INTERVAL SINCE COMPLETION OF RADIATION THERAPY: 4 month; completed treatment on 17.     TYPE OF RADIATION THERAPY ADMINISTERED: 4500 cGy in 25 fractions to pelvis + 2800 cGy in 5 fractions intracavitary brachytherapy boost     SUBJECTIVE: Ms. Anglin is a 47 year old female with diagnosis of recurrent endometrial cancer confined to the vagina. She was originally  diagnosed with grade 1 endometrial cancer, FIGO stage IB in 2004. She underwent JEMAL/BSO/LND with no adjuvant therapy. In June 2017  she presented with vaginal bleeding and was found to have a 1.5 to 2cm friable mass along the anterior vaginal wall, in close proximity to the introitus. This was biopsied and positive for recurrent endometrial adenocarcinoma, grade unspecified No additional disease was identified on restaging scans. She completed a course of external beam radiation therapy to the pelvis,receiving   4500 cGy in 25 fractions. A pelvic MRI completed after EBRTshowed that the previous nodularity along the anterior vaginal wall had almost completely resolved. There was some residual signal change, which was indeterminant and felt to potentially represent treatment related changes. Given the degree of response to her external beam treatment, we felt that she would be a candidate for intracavitary brachytherapy with a multichannel vaginal cylinder as opposed to interstitial brachytherapy. She received 1800 cGy in 3 fractions prescribed to the vaginal surface to an active length of 7.5 cm. this was followed by a boost of 1000 cGy in 2 fractions to an HRCTV, which was determined from her initial imaging as well as a gold fiducial marker that was placed prior to external beam radiation therapy. Overall, she tolerated the treatment with expected local toxicities including grade 2 skin reaction, grade 1 diarrhea, and grade 1 fatigue. Her skin reaction was confined to the groin and beneath the pannus. This was managed with Silvrstat and mepilex. She returns to clinic today for a routine follow up visit 4 months after completing RT.    On exam today, Ms. Anglin reports that she is overall doing well aside from a recent URI. She had a PET scan on 1/26/18 which showed no definite evidence of locally recurrent or metastatic disease. She was seen by Dr. Gore in follow up yesterday to review these results and  for a pelvic exam. She was noted to have a shortened atrophic vagina on pelvic exam without evidence of recurrent or residual disease. She reports that she is no longer having any diarrhea and reports only occasional dysuria. She had a UA on Thursday but has not received the results yet. She also endorses some persistent fatigue. She has not been using her dilator and her pelvic exam was uncomfortable yesterday per her report. ROS was otherwise unremarkable.     OBJECTIVE:   /73  Pulse 94  Wt 121.6 kg (268 lb)  SpO2 95%  BMI 46 kg/m2  GENERAL:  Appears well, in no acute distress.   HEENT: NCAT  RESP: Breathing comfortably on RA  CV: WWP  NEURO: CNs grossly intact       IMPRESSION: Recurrent endometrial adenocarcinoma of the anterior vaginal wall. She is now 4 months s/p salvage RT and has recovered from the acute toxicity of treatment. No evidence of residual or recurrent disease by PET-CT and exam.     RECOMMENDATIONS:   1. Follow up prn  2. Small dilator provided at today's appointment  3. Routine follow up with Dr. Lee in 3-4 months      The patient was seen and discussed with staff, Dr. Ambriz.    Lazaro Lind MD  Resident, PGY-3  Department of Radiation Oncology  [facility]  [phone]    I saw the patient with the resident.  I agree with the resident note and plan of care.      Hailey Ambriz MD       Patient Care Team:  Mikel Wells MD as PCP - General (Family Practice)  Chris Roberson as Referring Physician (OB/Gyn)  VARSHA LEE

## 2018-01-30 NOTE — PROGRESS NOTES
[facility] [place]  Radiation Oncology Follow-up Note  2017    Mary Anglin   MRN: [id number]  : 1970     DISEASE TREATED: Recurrent endometrial adenocarcinoma     INTERVAL SINCE COMPLETION OF RADIATION THERAPY: 4 month; completed treatment on 17.     TYPE OF RADIATION THERAPY ADMINISTERED: 4500 cGy in 25 fractions to pelvis + 2800 cGy in 5 fractions intracavitary brachytherapy boost     SUBJECTIVE: Ms. Anglin is a 47 year old female with diagnosis of recurrent endometrial cancer confined to the vagina. She was originally diagnosed with grade 1 endometrial cancer, FIGO stage IB in . She underwent JEMAL/BSO/LND with no adjuvant therapy. In 2017  she presented with vaginal bleeding and was found to have a 1.5 to 2cm friable mass along the anterior vaginal wall, in close proximity to the introitus. This was biopsied and positive for recurrent endometrial adenocarcinoma, grade unspecified No additional disease was identified on restaging scans. She completed a course of external beam radiation therapy to the pelvis,receiving   4500 cGy in 25 fractions. A pelvic MRI completed after EBRTshowed that the previous nodularity along the anterior vaginal wall had almost completely resolved. There was some residual signal change, which was indeterminant and felt to potentially represent treatment related changes. Given the degree of response to her external beam treatment, we felt that she would be a candidate for intracavitary brachytherapy with a multichannel vaginal cylinder as opposed to interstitial brachytherapy. She received 1800 cGy in 3 fractions prescribed to the vaginal surface to an active length of 7.5 cm. this was followed by a boost of 1000 cGy in 2 fractions to an HRCTV, which was determined from her initial imaging as well as a gold fiducial marker that was placed prior to external beam radiation therapy. Overall, she tolerated the treatment  with expected local toxicities including grade 2 skin reaction, grade 1 diarrhea, and grade 1 fatigue. Her skin reaction was confined to the groin and beneath the pannus. This was managed with Silvrstat and mepilex. She returns to clinic today for a routine follow up visit 4 months after completing RT.    On exam today, Ms. Anglin reports that she is overall doing well aside from a recent URI. She had a PET scan on 1/26/18 which showed no definite evidence of locally recurrent or metastatic disease. She was seen by Dr. Gore in follow up yesterday to review these results and for a pelvic exam. She was noted to have a shortened atrophic vagina on pelvic exam without evidence of recurrent or residual disease. She reports that she is no longer having any diarrhea and reports only occasional dysuria. She had a UA on Thursday but has not received the results yet. She also endorses some persistent fatigue. She has not been using her dilator and her pelvic exam was uncomfortable yesterday per her report. ROS was otherwise unremarkable.     OBJECTIVE:   /73  Pulse 94  Wt 121.6 kg (268 lb)  SpO2 95%  BMI 46 kg/m2  GENERAL:  Appears well, in no acute distress.   HEENT: NCAT  RESP: Breathing comfortably on RA  CV: WWP  NEURO: CNs grossly intact       IMPRESSION: Recurrent endometrial adenocarcinoma of the anterior vaginal wall. She is now 4 months s/p salvage RT and has recovered from the acute toxicity of treatment. No evidence of residual or recurrent disease by PET-CT and exam.     RECOMMENDATIONS:   1. Follow up prn  2. Small dilator provided at today's appointment  3. Routine follow up with Dr. Gore in 3-4 months      The patient was seen and discussed with staff, Dr. Ambriz.    Lazaro Lind MD  Resident, PGY-3  Department of Radiation Oncology  [facility]  [phone]    I saw the patient with the resident.  I agree with the resident note and plan of care.      Hailey NAIK  MD Pb     CC  Patient Care Team:  Mikel Wells MD as PCP - General (Family Practice)  Chris Roberson as Referring Physician (OB/Gyn)  VARSHA LEE

## 2018-05-07 ENCOUNTER — ONCOLOGY VISIT (OUTPATIENT)
Dept: ONCOLOGY | Facility: CLINIC | Age: 48
End: 2018-05-07
Attending: NURSE PRACTITIONER
Payer: MEDICAID

## 2018-05-07 VITALS
OXYGEN SATURATION: 99 % | BODY MASS INDEX: 43.43 KG/M2 | WEIGHT: 253 LBS | TEMPERATURE: 98 F | DIASTOLIC BLOOD PRESSURE: 81 MMHG | SYSTOLIC BLOOD PRESSURE: 134 MMHG | HEART RATE: 80 BPM | RESPIRATION RATE: 18 BRPM

## 2018-05-07 DIAGNOSIS — Z08 ENCOUNTER FOR FOLLOW-UP SURVEILLANCE OF ENDOMETRIAL CANCER: Primary | ICD-10-CM

## 2018-05-07 DIAGNOSIS — Z85.42 ENCOUNTER FOR FOLLOW-UP SURVEILLANCE OF ENDOMETRIAL CANCER: Primary | ICD-10-CM

## 2018-05-07 PROCEDURE — 40000114 ZZH STATISTIC NO CHARGE CLINIC VISIT

## 2018-05-07 PROCEDURE — 99213 OFFICE O/P EST LOW 20 MIN: CPT | Mod: ZP | Performed by: NURSE PRACTITIONER

## 2018-05-07 RX ORDER — HYDROXYZINE HYDROCHLORIDE 50 MG/1
50 TABLET, FILM COATED ORAL
COMMUNITY
Start: 2018-04-17 | End: 2018-05-17

## 2018-05-07 ASSESSMENT — PAIN SCALES - GENERAL: PAINLEVEL: NO PAIN (0)

## 2018-05-07 NOTE — MR AVS SNAPSHOT
"              After Visit Summary   5/7/2018    Mary Anglin    MRN: [id number]           Patient Information     Date Of Birth          1970        Visit Information        Provider Department      5/7/2018 4:40 PM Becca Pino APRN CNP [facility]        Today's Diagnoses     Encounter for follow-up surveillance of endometrial cancer    -  1       Follow-ups after your visit        Follow-up notes from your care team     Return in about 3 months (around 8/7/2018), or if symptoms worsen or fail to improve, for surveillance with Becca.      Your next 10 appointments already scheduled     Aug 08, 2018  4:00 PM CDT   (Arrive by 3:45 PM)   Return Visit with ES Leary CNP   [facility] ([facility])    [address] [place] [address]  [address] [place] [address] [phone]              Who to contact     If you have questions or need follow up information about today's clinic visit or your schedule please contact [facility] directly at [phone].  Normal or non-critical lab and imaging results will be communicated to you by Collaxhart, letter or phone within 4 business days after the clinic has received the results. If you do not hear from us within 7 days, please contact the clinic through Roojoomt or phone. If you have a critical or abnormal lab result, we will notify you by phone as soon as possible.  Submit refill requests through [employer] or call your pharmacy and they will forward the refill request to us. Please allow 3 business days for your refill to be completed.          Additional Information About Your Visit        MyChart Information     [employer] lets you send messages to your doctor, view your test results, renew your prescriptions, schedule appointments and more. To sign up, go to [email or web address] . Click on \"Log in\" on the left side of the screen, which will take you to the Welcome " "page. Then click on \"Sign up Now\" on the right side of the page.     You will be asked to enter the access code listed below, as well as some personal information. Please follow the directions to create your username and password.     Your access code is: [email or web address]  Expires: 2018  6:30 AM     Your access code will  in 90 days. If you need help or a new code, please call your [facility]3] or [phone].        Care EveryWhere ID     This is your Care EveryWhere ID. This could be used by other organizations to access your [place] medical records  [id number]        Your Vitals Were     Pulse Temperature Respirations Pulse Oximetry BMI (Body Mass Index)       80 98  F (36.7  C) (Oral) 18 99% 43.43 kg/m2        Blood Pressure from Last 3 Encounters:   18 134/81   18 121/73   18 121/76    Weight from Last 3 Encounters:   18 114.8 kg (253 lb)   18 121.6 kg (268 lb)   18 121.6 kg (268 lb)              Today, you had the following     No orders found for display       Primary Care Provider Office Phone # Fax #    Mikel Wells [phone]       [place] [address]        Equal Access to Services     ALEX ROSAS : Hadii peggy vazquezo Sourban, waaxda luqadaha, qaybta kaalmada cecy, loraine harris. So [facility] [phone].    ATENCIÓN: Si habla español, tiene a do disposición servicios gratuitos de asistencia lingüística. Gregor al [phone].    We comply with applicable federal civil rights laws and [place] laws. We do not discriminate on the basis of race, color, national origin, age, disability, sex, sexual orientation, or gender identity.            Thank you!     Thank you for choosing [facility]  for your care. Our goal is always to provide you with excellent care. Hearing back from our patients is one way we can continue to improve our services. " Please take a few minutes to complete the written survey that you may receive in the mail after your visit with us. Thank you!             Your Updated Medication List - Protect others around you: Learn how to safely use, store and throw away your medicines at www.disposemymeds.org.          This list is accurate as of 5/7/18  5:32 PM.  Always use your most recent med list.                   Brand Name Dispense Instructions for use Diagnosis    ACCU-CHEK BRADLEY PLUS test strip   Generic drug:  blood glucose monitoring      1 time per day        albuterol 108 (90 Base) MCG/ACT Inhaler    PROAIR HFA/PROVENTIL HFA/VENTOLIN HFA     Inhale 2 puffs into the lungs every 4 hours as needed        ANTI-DIARRHEAL 2 MG capsule   Generic drug:  loperamide      Take 2 mg by mouth as needed for diarrhea        atorvastatin 10 MG tablet    LIPITOR     Take 0.5 tablets (5 mg total) by mouth every night at bedtime.        blood glucose monitoring meter device kit      1 Device        clobetasol 0.05 % cream    TEMOVATE     apply topically PRN        clotrimazole 1 % cream    LOTRIMIN    40 g    Apply topically 2 times daily    Fungal infection of skin, Endometrial cancer (H)       docusate sodium 100 MG capsule    COLACE     Take 100 mg by mouth 2 times daily as needed        doxepin 10 MG capsule    SINEquan     Take 20 mg by mouth At Bedtime        escitalopram 20 MG tablet    LEXAPRO     Take 20 mg by mouth At Bedtime        furosemide 40 MG tablet    LASIX     Take 40 mg by mouth 2 times daily        hydrOXYzine 50 MG tablet    ATARAX     50 mg        iohexol 140 MG/ML Soln solution    OMNIPAQUE    50 mL    Mix entire bottle (50ml) of contast with 600ml (20 ounces) of water and drink half 2 hrs prior to CT scan and half 1 hr prior to scan    Endometrial cancer (H)       Lancet Device Misc      As Directed        Melatonin 5 MG Tbdp      Take 2.5-5 mg by mouth nightly as needed        metFORMIN 500 MG tablet    GLUCOPHAGE     Take  500 mg by mouth daily (with dinner)        mupirocin 2 % ointment    BACTROBAN     Apply to affected area 2 times a day as needed for other (Specify) (rash on back of neck)        SILVRSTAT WOUND DRESSING Gel     85.05 g    Externally apply topically 2 times daily    Skin desquamation

## 2018-05-07 NOTE — LETTER
2018       RE: Mary Anglin  [address]   [facility] [address]     Dear Colleague,    Thank you for referring your patient, Mary Anglin, to the [facility]. Please see a copy of my visit note below.    Gynecologic Oncology Follow-Up Visit    RE: Mary Anglin  MRN: [id number]  : 1970  Date of Visit: 2018    CC: Mary Anglin  is a 48 year old female with a history of recurrent endometrial cancer. She completed treatment with EBRT and HDR on 17. She presents today for a three month surveillance visit.    HPI:  Mary comes to the clinic feeling fair without gynecologic concerns. She is not sexually active and does not use her dilator. She is still having some fatigue post-treatment. Notes rare intermittent hot flashes. Chronic issues with constipation, has a few drops of blood in the toilet when straining to have a BM- last colonoscopy done prior to starting EBRT/HDR. Continues to follow up with her PCP for chronic issues including diabetes. Due for mammogram. Will be seeing her psychiatry team next week for depression. Recently hospitalized for a UTI and cellulitis, resolved. Denies unintended weight loss, weakness, changes in vision or hearing, shortness of breath, cough, chest pain, abdominal pain, dyspepsia, nausea, vomiting, diarrhea, bloating, dysuria, urinary frequency or urgency, hematuria, pelvic pain, lower back pain, vaginal bleeding, vaginal discharge, numbness or tingling, or swelling of the extremities.    Oncology History:  : Diagnosed with stage IB grade 1 endometrial cancer. JEMAL/BSO/LND.  2017: Vaginal bleeding. Mass to vaginal wall biopsied and positive for recurrent endometrial adenocarcinoma.   2017: Completed EBRT and vaginal brachytherapy  18: PET impression:  IMPRESSION: In this patient with a history of recurrent endometrial  cancer status post hysterectomy, bilateral salpingo-oophorectomy,  pelvic sahra  dissection, and radiotherapy:  1. No definite evidence of locally recurrent or metastatic disease in  the chest, abdomen, or pelvis.  2. Unchanged 5 mm groundglass nodule in the lingula, stable from  10/23/2017.  3. Mildly FDG avid soft tissue nodule in the right posterior arm  adipose tissue. Indeterminate. Attention on follow-up.    Past Medical History:   Diagnosis Date     Cellulitis of the lower extremities.      Chronic edema of the lower extremities      Depression      Diabetes (H)      Endometrial cancer (H)      GERD (gastroesophageal reflux disease)      HLD (hyperlipidemia)      Morbid obesity (H)      MRSA (methicillin resistant Staphylococcus aureus) infection      Vertigo        Past Surgical History:   Procedure Laterality Date     C TOTAL ABDOM HYSTERECTOMY  2004     COLONOSCOPY       HERNIORRHAPHY UMBILICAL       INSERT RADIATION SEED MARKER CERVIX N/A 7/21/2017    Procedure: INSERT RADIATION SEED MARKER CERVIX;  Pelvic Exam, Insertion Radiation Gold Seed Markers;  Surgeon: Jarrod Kessler MD;  Location: [facility] OR     SALPINGO-OOPHORECTOMY BILATERAL       TONSILLECTOMY       UPPER GI ENDOSCOPY         Social History     Social History     Marital status: Single     Spouse name: N/A     Number of children: N/A     Years of education: N/A     Occupational History     Not on file.     Social History Main Topics     Smoking status: Never Smoker     Smokeless tobacco: Never Used     Alcohol use No     Drug use: No     Sexual activity: Not Currently     Other Topics Concern     Not on file     Social History Narrative       Family History   Problem Relation Age of Onset     Skin Cancer Father      KIDNEY DISEASE Father      Required hemodialysis     HEART DISEASE Maternal Grandfather      MENTAL ILLNESS Mother      Catatonia after loss of mother     Breast Cancer Paternal Grandmother      CEREBROVASCULAR DISEASE Maternal Grandmother      Hypertension Maternal Grandmother      DIABETES Maternal Grandmother       DIABETES Paternal Grandfather        Current Outpatient Prescriptions   Medication     albuterol (PROAIR HFA/PROVENTIL HFA/VENTOLIN HFA) 108 (90 BASE) MCG/ACT Inhaler     atorvastatin (LIPITOR) 10 MG tablet     blood glucose monitoring (ACCU-CHEK BRADLEY PLUS) meter device kit     blood glucose monitoring (ACCU-CHEK BRADLEY PLUS) test strip     clobetasol (TEMOVATE) 0.05 % cream     clotrimazole (LOTRIMIN) 1 % cream     docusate sodium (COLACE) 100 MG capsule     doxepin (SINEQUAN) 10 MG capsule     escitalopram (LEXAPRO) 20 MG tablet     furosemide (LASIX) 40 MG tablet     iohexol (OMNIPAQUE) 140 MG/ML SOLN solution     Lancet Device MISC     loperamide (ANTI-DIARRHEAL) 2 MG capsule     Melatonin 5 MG TBDP     metFORMIN (GLUCOPHAGE) 500 MG tablet     mupirocin (BACTROBAN) 2 % ointment     Silver (SILVRSTAT WOUND DRESSING) GEL     No current facility-administered medications for this visit.           Allergies   Allergen Reactions     Penicillin G Other (See Comments)     Azithromycin Rash     Benzonatate Unknown     Cephalexin Unknown     Sulfamethoxazole W/Trimethoprim Rash     Bupropion Other (See Comments)     No rash     Clindamycin Other (See Comments)     Aspirin Other (See Comments)     Pt refuses to take ASA         Review of Systems  General: + fatigue, difficulty sleeping. Denies changes in weight, weakness, appetite changes, night sweats, hot flashes, fever, chills  HEENT: Denies headaches, hair loss, visual difficulty or disturbances, masses, head injury, tinnitus, hearing loss, epistaxis, congestion, problems with teeth or gums, dysphonia, or dysphagia  Pulmonary: Denies cough, sputum, hemoptysis, shortness of breath, dyspnea on exertion, wheezing, or allergies  Cardiovascular: Denies chest pain, fainting, palpitations, murmurs, activity intolerance, swelling in legs, or high blood pressure  Gastrointestinal: + constipation. Denies nausea, vomiting, diarrhea, abdominal pain, bloating, heartburn,  melena, hematochezia, or jaundice  Genitourinary: Denies dysuria, urinary urgency or frequency, hematuria, cloudy or malodorous urine, incontinence, repeat urinary tract infections, flank pain, pelvic pain, vaginal bleeding, vaginal discharge, or vaginal dryness  Sexual Function: N/A  Integumentary: Denies rashes, sores, changing moles, or scarring  Hematologic: Denies swollen lymph nodes, masses, easy bruising, or easy bleeding  Musculoskeletal: Denies falls, back pain, myalgias, arthralgias, stiffness, muscle weakness, or muscle cramps  Neurologic: Denies numbness or tingling, changes in memory, difficulty with walking, dizziness, seizures, or tremors  Psychiatric: + depression. Denies anxiety, mood changes, suicidal thoughts, or difficulty concentrating  Endocrine: Denies polydipsia, polyuria, temperature intolerance, or history of thyroid disease      OBJECTIVE:    Physical Exam:    /81  Pulse 80  Temp 98  F (36.7  C) (Oral)  Resp 18  Wt 114.8 kg (253 lb)  SpO2 99%  BMI 43.43 kg/m2    CONSTITUTIONAL: Alert non-toxic appearing female in no acute distress  HEAD: Normocephalic, atraumatic  EYES: PERRLA; no scleral icterus  ENT: Oropharynx pink without lesions  NECK: Neck supple without lymphadenopathy  RESPIRATORY: Lungs clear to auscultation, no increased work of breathing noted  CV: Regular rate and rhythm, S1S2, no clicks, murmurs, rubs, or gallops; bilateral lower extremities without edema, dorsalis pedis pulses 2+ bilaterally  GASTROINTESTINAL: Normoactive bowel sounds x4 quadrants, abdomen obese, soft, non-distended, and non-tender to palpation without masses or organomegaly  GENITOURINARY: External genitalia and urethral meatus pink without lesions, masses, or excoriation. Vagina atrophic without masses or lesions. Vagina significantly foreshortened to 1cm in depth, able to admit one digit only limiting bimanual exam. No apparent bleeding lesions, masses, or nodules noted or palpated to vaginal  cuff. Cervix surgically absent. Rectovaginal exam demonstrates no rectovaginal masses or nodularity. No visible external hemorrhoids, no blood to glove with THELMA.  LYMPHATIC: Cervical, supraclavicular, and inguinal nodes without lymphadenopathy  MUSCULOSKELETAL: Moves all extremities, no obvious muscle wasting; no palpable masses, tenderness, or abnormalities to BUE  NEUROLOGIC: No gross deficits, normal gait  SKIN: Appropriate color for race, warm and dry, no rashes or lesions to unclothed skin  PSYCHIATRIC: Pleasant and interactive, affect bright, makes appropriate eye contact, thought process linear    Assessment/Plan:  1) Surveillance for recurrent endometrial cancer: No signs of recurrence. Continue surveillance every three months x2 years (through 9/2019) followed by every six months x3 years (through 9/2022) then annually thereafter with pelvic/rectal exam. Reinforced importance of using vaginal dilator to prevent even further stenosis, shortening, and atrophy although her shortening is already quite significant which may limit future exams. Reviewed signs and symptoms  of recurrence including but not limited to bleeding from vagina, bladder, or rectum, bloating, early satiety, swelling in the lower extremities, abdominal or lower back pain, changes in bowel or bladder patterns, shortness of breath, increased fatigue, unexplained weight loss, and night sweats. Reviewed recommendations from [facility] not to perform surveillance pap smears in women diagnosed with endometrial cancer as this does not improve detection of local recurrence. Reviewed signs and symptoms for when she should contact the clinic or seek additional care. Patient to contact the clinic with any questions or concerns in the interim.  2) Genetic testing: MMR protein expression intact making Burger syndrome unlikely.  3) Health maintenance issues discussed today include to follow up with PCP for co-morbid conditions and non-gynecologic issues. To  follow up with PCP for mammogram and rectal bleeding.  4) Patient verbalized understanding of and agreement with plan.    A total of 20 minutes of face to face time spent with patient, over 50% of which was spent in counseling and coordination of care.    ES Leary, FNP-C  Division of Gynecologic Oncology  [facility]  Pager: [phone]

## 2018-05-07 NOTE — PROGRESS NOTES
Gynecologic Oncology Follow-Up Visit    RE: Mary Anglin  MRN: [id number]  : 1970  Date of Visit: 2018    CC: Mary Anglin  is a 48 year old female with a history of recurrent endometrial cancer. She completed treatment with EBRT and HDR on 17. She presents today for a three month surveillance visit.    HPI:  Mary comes to the clinic feeling fair without gynecologic concerns. She is not sexually active and does not use her dilator. She is still having some fatigue post-treatment. Notes rare intermittent hot flashes. Chronic issues with constipation, has a few drops of blood in the toilet when straining to have a BM- last colonoscopy done prior to starting EBRT/HDR. Continues to follow up with her PCP for chronic issues including diabetes. Due for mammogram. Will be seeing her psychiatry team next week for depression. Recently hospitalized for a UTI and cellulitis, resolved. Denies unintended weight loss, weakness, changes in vision or hearing, shortness of breath, cough, chest pain, abdominal pain, dyspepsia, nausea, vomiting, diarrhea, bloating, dysuria, urinary frequency or urgency, hematuria, pelvic pain, lower back pain, vaginal bleeding, vaginal discharge, numbness or tingling, or swelling of the extremities.    Oncology History:  : Diagnosed with stage IB grade 1 endometrial cancer. JEMAL/BSO/LND.  2017: Vaginal bleeding. Mass to vaginal wall biopsied and positive for recurrent endometrial adenocarcinoma.   2017: Completed EBRT and vaginal brachytherapy  18: PET impression:  IMPRESSION: In this patient with a history of recurrent endometrial  cancer status post hysterectomy, bilateral salpingo-oophorectomy,  pelvic sahra dissection, and radiotherapy:  1. No definite evidence of locally recurrent or metastatic disease in  the chest, abdomen, or pelvis.  2. Unchanged 5 mm groundglass nodule in the lingula, stable from  10/23/2017.  3. Mildly FDG avid soft tissue nodule in  the right posterior arm  adipose tissue. Indeterminate. Attention on follow-up.    Past Medical History:   Diagnosis Date     Cellulitis of the lower extremities.      Chronic edema of the lower extremities      Depression      Diabetes (H)      Endometrial cancer (H)      GERD (gastroesophageal reflux disease)      HLD (hyperlipidemia)      Morbid obesity (H)      MRSA (methicillin resistant Staphylococcus aureus) infection      Vertigo        Past Surgical History:   Procedure Laterality Date     C TOTAL ABDOM HYSTERECTOMY  2004     COLONOSCOPY       HERNIORRHAPHY UMBILICAL       INSERT RADIATION SEED MARKER CERVIX N/A 7/21/2017    Procedure: INSERT RADIATION SEED MARKER CERVIX;  Pelvic Exam, Insertion Radiation Gold Seed Markers;  Surgeon: Jarrod Kessler MD;  Location: [facility] OR     SALPINGO-OOPHORECTOMY BILATERAL       TONSILLECTOMY       UPPER GI ENDOSCOPY         Social History     Social History     Marital status: Single     Spouse name: N/A     Number of children: N/A     Years of education: N/A     Occupational History     Not on file.     Social History Main Topics     Smoking status: Never Smoker     Smokeless tobacco: Never Used     Alcohol use No     Drug use: No     Sexual activity: Not Currently     Other Topics Concern     Not on file     Social History Narrative       Family History   Problem Relation Age of Onset     Skin Cancer Father      KIDNEY DISEASE Father      Required hemodialysis     HEART DISEASE Maternal Grandfather      MENTAL ILLNESS Mother      Catatonia after loss of mother     Breast Cancer Paternal Grandmother      CEREBROVASCULAR DISEASE Maternal Grandmother      Hypertension Maternal Grandmother      DIABETES Maternal Grandmother      DIABETES Paternal Grandfather        Current Outpatient Prescriptions   Medication     albuterol (PROAIR HFA/PROVENTIL HFA/VENTOLIN HFA) 108 (90 BASE) MCG/ACT Inhaler     atorvastatin (LIPITOR) 10 MG tablet     blood glucose monitoring (ACCU-CHEK  BRADLEY PLUS) meter device kit     blood glucose monitoring (ACCU-CHEK BRADLEY PLUS) test strip     clobetasol (TEMOVATE) 0.05 % cream     clotrimazole (LOTRIMIN) 1 % cream     docusate sodium (COLACE) 100 MG capsule     doxepin (SINEQUAN) 10 MG capsule     escitalopram (LEXAPRO) 20 MG tablet     furosemide (LASIX) 40 MG tablet     iohexol (OMNIPAQUE) 140 MG/ML SOLN solution     Lancet Device MISC     loperamide (ANTI-DIARRHEAL) 2 MG capsule     Melatonin 5 MG TBDP     metFORMIN (GLUCOPHAGE) 500 MG tablet     mupirocin (BACTROBAN) 2 % ointment     Silver (SILVRSTAT WOUND DRESSING) GEL     No current facility-administered medications for this visit.           Allergies   Allergen Reactions     Penicillin G Other (See Comments)     Azithromycin Rash     Benzonatate Unknown     Cephalexin Unknown     Sulfamethoxazole W/Trimethoprim Rash     Bupropion Other (See Comments)     No rash     Clindamycin Other (See Comments)     Aspirin Other (See Comments)     Pt refuses to take ASA         Review of Systems  General: + fatigue, difficulty sleeping. Denies changes in weight, weakness, appetite changes, night sweats, hot flashes, fever, chills  HEENT: Denies headaches, hair loss, visual difficulty or disturbances, masses, head injury, tinnitus, hearing loss, epistaxis, congestion, problems with teeth or gums, dysphonia, or dysphagia  Pulmonary: Denies cough, sputum, hemoptysis, shortness of breath, dyspnea on exertion, wheezing, or allergies  Cardiovascular: Denies chest pain, fainting, palpitations, murmurs, activity intolerance, swelling in legs, or high blood pressure  Gastrointestinal: + constipation. Denies nausea, vomiting, diarrhea, abdominal pain, bloating, heartburn, melena, hematochezia, or jaundice  Genitourinary: Denies dysuria, urinary urgency or frequency, hematuria, cloudy or malodorous urine, incontinence, repeat urinary tract infections, flank pain, pelvic pain, vaginal bleeding, vaginal discharge, or vaginal  dryness  Sexual Function: N/A  Integumentary: Denies rashes, sores, changing moles, or scarring  Hematologic: Denies swollen lymph nodes, masses, easy bruising, or easy bleeding  Musculoskeletal: Denies falls, back pain, myalgias, arthralgias, stiffness, muscle weakness, or muscle cramps  Neurologic: Denies numbness or tingling, changes in memory, difficulty with walking, dizziness, seizures, or tremors  Psychiatric: + depression. Denies anxiety, mood changes, suicidal thoughts, or difficulty concentrating  Endocrine: Denies polydipsia, polyuria, temperature intolerance, or history of thyroid disease      OBJECTIVE:    Physical Exam:    /81  Pulse 80  Temp 98  F (36.7  C) (Oral)  Resp 18  Wt 114.8 kg (253 lb)  SpO2 99%  BMI 43.43 kg/m2    CONSTITUTIONAL: Alert non-toxic appearing female in no acute distress  HEAD: Normocephalic, atraumatic  EYES: PERRLA; no scleral icterus  ENT: Oropharynx pink without lesions  NECK: Neck supple without lymphadenopathy  RESPIRATORY: Lungs clear to auscultation, no increased work of breathing noted  CV: Regular rate and rhythm, S1S2, no clicks, murmurs, rubs, or gallops; bilateral lower extremities without edema, dorsalis pedis pulses 2+ bilaterally  GASTROINTESTINAL: Normoactive bowel sounds x4 quadrants, abdomen obese, soft, non-distended, and non-tender to palpation without masses or organomegaly  GENITOURINARY: External genitalia and urethral meatus pink without lesions, masses, or excoriation. Vagina atrophic without masses or lesions. Vagina significantly foreshortened to 1cm in depth, able to admit one digit only limiting bimanual exam. No apparent bleeding lesions, masses, or nodules noted or palpated to vaginal cuff. Cervix surgically absent. Rectovaginal exam demonstrates no rectovaginal masses or nodularity. No visible external hemorrhoids, no blood to glove with THELMA.  LYMPHATIC: Cervical, supraclavicular, and inguinal nodes without  lymphadenopathy  MUSCULOSKELETAL: Moves all extremities, no obvious muscle wasting; no palpable masses, tenderness, or abnormalities to BUE  NEUROLOGIC: No gross deficits, normal gait  SKIN: Appropriate color for race, warm and dry, no rashes or lesions to unclothed skin  PSYCHIATRIC: Pleasant and interactive, affect bright, makes appropriate eye contact, thought process linear    Assessment/Plan:  1) Surveillance for recurrent endometrial cancer: No signs of recurrence. Continue surveillance every three months x2 years (through 9/2019) followed by every six months x3 years (through 9/2022) then annually thereafter with pelvic/rectal exam. Reinforced importance of using vaginal dilator to prevent even further stenosis, shortening, and atrophy although her shortening is already quite significant which may limit future exams. Reviewed signs and symptoms  of recurrence including but not limited to bleeding from vagina, bladder, or rectum, bloating, early satiety, swelling in the lower extremities, abdominal or lower back pain, changes in bowel or bladder patterns, shortness of breath, increased fatigue, unexplained weight loss, and night sweats. Reviewed recommendations from [facility] not to perform surveillance pap smears in women diagnosed with endometrial cancer as this does not improve detection of local recurrence. Reviewed signs and symptoms for when she should contact the clinic or seek additional care. Patient to contact the clinic with any questions or concerns in the interim.  2) Genetic testing: MMR protein expression intact making Burger syndrome unlikely.  3) Health maintenance issues discussed today include to follow up with PCP for co-morbid conditions and non-gynecologic issues. To follow up with PCP for mammogram and rectal bleeding.  4) Patient verbalized understanding of and agreement with plan.    A total of 20 minutes of face to face time spent with patient, over 50% of which was spent in counseling and  coordination of care.    ES Leary, FNP-C  Division of Gynecologic Oncology  [facility]  Pager: [phone]

## 2018-08-08 ENCOUNTER — ONCOLOGY VISIT (OUTPATIENT)
Dept: ONCOLOGY | Facility: CLINIC | Age: 48
End: 2018-08-08
Attending: NURSE PRACTITIONER
Payer: MEDICAID

## 2018-08-08 VITALS
RESPIRATION RATE: 16 BRPM | BODY MASS INDEX: 44.37 KG/M2 | TEMPERATURE: 98.6 F | HEART RATE: 80 BPM | WEIGHT: 259.9 LBS | HEIGHT: 64 IN | DIASTOLIC BLOOD PRESSURE: 85 MMHG | OXYGEN SATURATION: 94 % | SYSTOLIC BLOOD PRESSURE: 150 MMHG

## 2018-08-08 DIAGNOSIS — Z85.42 ENCOUNTER FOR FOLLOW-UP SURVEILLANCE OF ENDOMETRIAL CANCER: Primary | ICD-10-CM

## 2018-08-08 DIAGNOSIS — N89.5: ICD-10-CM

## 2018-08-08 DIAGNOSIS — Z08 ENCOUNTER FOR FOLLOW-UP SURVEILLANCE OF ENDOMETRIAL CANCER: Primary | ICD-10-CM

## 2018-08-08 PROCEDURE — G0463 HOSPITAL OUTPT CLINIC VISIT: HCPCS | Mod: ZF

## 2018-08-08 PROCEDURE — 99213 OFFICE O/P EST LOW 20 MIN: CPT | Mod: ZP | Performed by: NURSE PRACTITIONER

## 2018-08-08 RX ORDER — FLUOXETINE 10 MG/1
CAPSULE ORAL
COMMUNITY
Start: 2018-07-10 | End: 2018-11-09 | Stop reason: DRUGHIGH

## 2018-08-08 RX ORDER — HYDROXYZINE HYDROCHLORIDE 50 MG/1
TABLET, FILM COATED ORAL
COMMUNITY
Start: 2018-07-31

## 2018-08-08 RX ORDER — CLOBETASOL PROPIONATE 0.5 MG/G
CREAM TOPICAL
COMMUNITY
Start: 2018-07-31

## 2018-08-08 ASSESSMENT — PAIN SCALES - GENERAL: PAINLEVEL: NO PAIN (0)

## 2018-08-08 NOTE — LETTER
2018       RE: Mary Anglin  [address]  [facility] [address]     Dear Colleague,    Thank you for referring your patient, Mary Anglin, to the [facility]. Please see a copy of my visit note below.    Gynecologic Oncology Follow-Up Visit    RE: Mary Anglin  MRN: [id number]  : 1970  Date of Visit: 2018    CC: Mary Anglin  is a 48 year old female with a history of recurrent endometrial cancer. She completed treatment with EBRT and HDR on 17. She presents today for a three month surveillance visit.    HPI:  Mary comes to the clinic feeling fair without gynecologic concerns. She is not sexually active and does not use her dilator- states she had a severe bout of depression which took precedence. Has since tried to use her dilator but states that it no longer fits. Due to see her PCP for wellness exam and for mammogram. Continues to see her psychiatrist regularly. Recently hospitalized for a UTI and cellulitis, resolved. Denies unintended weight loss, weakness, changes in vision or hearing, shortness of breath, cough, chest pain, abdominal pain, dyspepsia, nausea, vomiting, diarrhea, bloating, dysuria, urinary frequency or urgency, hematuria, pelvic pain, lower back pain, vaginal bleeding, vaginal discharge, numbness or tingling, or swelling of the extremities.    Oncology History:  : Diagnosed with stage IB grade 1 endometrial cancer. JEMAL/BSO/LND.  2017: Vaginal bleeding. Mass to vaginal wall biopsied and positive for recurrent endometrial adenocarcinoma.   2017: Completed EBRT and vaginal brachytherapy  18: PET impression:  IMPRESSION: In this patient with a history of recurrent endometrial  cancer status post hysterectomy, bilateral salpingo-oophorectomy,  pelvic sahra dissection, and radiotherapy:  1. No definite evidence of locally recurrent or metastatic disease in  the chest, abdomen, or pelvis.  2. Unchanged 5 mm  groundglass nodule in the lingula, stable from  10/23/2017.  3. Mildly FDG avid soft tissue nodule in the right posterior arm  adipose tissue. Indeterminate. Attention on follow-up.    Past Medical History:   Diagnosis Date     Cellulitis of the lower extremities.      Chronic edema of the lower extremities      Depression      Diabetes (H)      Endometrial cancer (H)      GERD (gastroesophageal reflux disease)      HLD (hyperlipidemia)      Morbid obesity (H)      MRSA (methicillin resistant Staphylococcus aureus) infection      Vertigo        Past Surgical History:   Procedure Laterality Date     C TOTAL ABDOM HYSTERECTOMY  2004     COLONOSCOPY       HERNIORRHAPHY UMBILICAL       INSERT RADIATION SEED MARKER CERVIX N/A 7/21/2017    Procedure: INSERT RADIATION SEED MARKER CERVIX;  Pelvic Exam, Insertion Radiation Gold Seed Markers;  Surgeon: Jarrod Kessler MD;  Location: [facility] OR     SALPINGO-OOPHORECTOMY BILATERAL       TONSILLECTOMY       UPPER GI ENDOSCOPY         Social History     Social History     Marital status: Single     Spouse name: N/A     Number of children: N/A     Years of education: N/A     Occupational History     Not on file.     Social History Main Topics     Smoking status: Never Smoker     Smokeless tobacco: Never Used     Alcohol use No     Drug use: No     Sexual activity: Not Currently     Other Topics Concern     Not on file     Social History Narrative       Family History   Problem Relation Age of Onset     Skin Cancer Father      KIDNEY DISEASE Father      Required hemodialysis     HEART DISEASE Maternal Grandfather      Mental Illness Mother      Catatonia after loss of mother     Breast Cancer Paternal Grandmother      Cerebrovascular Disease Maternal Grandmother      Hypertension Maternal Grandmother      Diabetes Maternal Grandmother      Diabetes Paternal Grandfather        Current Outpatient Prescriptions   Medication     albuterol (PROAIR HFA/PROVENTIL HFA/VENTOLIN HFA) 108 (90  BASE) MCG/ACT Inhaler     atorvastatin (LIPITOR) 10 MG tablet     blood glucose monitoring (ACCU-CHEK BRADLEY PLUS) meter device kit     blood glucose monitoring (ACCU-CHEK BRADLEY PLUS) test strip     clotrimazole (LOTRIMIN) 1 % cream     docusate sodium (COLACE) 100 MG capsule     furosemide (LASIX) 40 MG tablet     Lancet Device MISC     Melatonin 5 MG TBDP     metFORMIN (GLUCOPHAGE) 500 MG tablet     mupirocin (BACTROBAN) 2 % ointment     doxepin (SINEQUAN) 10 MG capsule     escitalopram (LEXAPRO) 20 MG tablet     iohexol (OMNIPAQUE) 140 MG/ML SOLN solution     loperamide (ANTI-DIARRHEAL) 2 MG capsule     Silver (SILVRSTAT WOUND DRESSING) GEL     No current facility-administered medications for this visit.           Allergies   Allergen Reactions     Penicillin G Other (See Comments)     Azithromycin Rash     Benzonatate Unknown     Cephalexin Unknown     Sulfamethoxazole W/Trimethoprim Rash     Bupropion Other (See Comments)     No rash     Clindamycin Other (See Comments)     Aspirin Other (See Comments)     Pt refuses to take ASA         Review of Systems  General: Denies changes in weight, weakness, appetite changes, night sweats, hot flashes, fever, chills, fatigue, difficulty sleeping  HEENT: Denies headaches, hair loss, visual difficulty or disturbances, masses, head injury, tinnitus, hearing loss, epistaxis, congestion, problems with teeth or gums, dysphonia, or dysphagia  Pulmonary: Denies cough, sputum, hemoptysis, shortness of breath, dyspnea on exertion, wheezing, or allergies  Cardiovascular: Denies chest pain, fainting, palpitations, murmurs, activity intolerance, swelling in legs, or high blood pressure  Gastrointestinal: Denies constipation, nausea, vomiting, diarrhea, abdominal pain, bloating, heartburn, melena, hematochezia, or jaundice  Genitourinary: Denies dysuria, urinary urgency or frequency, hematuria, cloudy or malodorous urine, incontinence, repeat urinary tract infections, flank pain,  "pelvic pain, vaginal bleeding, vaginal discharge, or vaginal dryness  Sexual Function: N/A  Integumentary: Denies rashes, sores, changing moles, or scarring  Hematologic: Denies swollen lymph nodes, masses, easy bruising, or easy bleeding  Musculoskeletal: Denies falls, back pain, myalgias, arthralgias, stiffness, muscle weakness, or muscle cramps  Neurologic: Denies numbness or tingling, changes in memory, difficulty with walking, dizziness, seizures, or tremors  Psychiatric: + depression. Denies anxiety, mood changes, suicidal thoughts, or difficulty concentrating  Endocrine: Denies polydipsia, polyuria, temperature intolerance, or history of thyroid disease      OBJECTIVE:    Physical Exam:    /85 (BP Location: Right arm, Patient Position: Sitting, Cuff Size: Adult Regular)  Pulse 80  Temp 98.6  F (37  C) (Oral)  Resp 16  Ht 1.626 m (5' 4.02\")  Wt 117.9 kg (259 lb 14.4 oz)  SpO2 94%  BMI 44.59 kg/m2    CONSTITUTIONAL: Alert non-toxic appearing female in no acute distress  HEAD: Normocephalic, atraumatic  EYES: PERRLA; no scleral icterus  ENT: Oropharynx pink without lesions  NECK: Neck supple without lymphadenopathy  RESPIRATORY: Lungs clear to auscultation, no increased work of breathing noted  CV: Regular rate and rhythm, S1S2, no clicks, murmurs, rubs, or gallops; bilateral lower extremities without edema, dorsalis pedis pulses 2+ bilaterally  GASTROINTESTINAL: Normoactive bowel sounds x4 quadrants, abdomen obese, soft, non-distended, and non-tender to palpation without masses or organomegaly  GENITOURINARY: External genitalia and urethral meatus pink without lesions, masses, or excoriation. Vagina significantly foreshortened to <1cm in depth, able to admit one digit only limiting bimanual exam. Difficult to visualize vagina with adolescent speculum, but on limited view there were no apparent bleeding lesions, masses, or nodules noted or palpated to vaginal cuff. Cervix surgically absent. " Rectovaginal exam demonstrates no rectovaginal masses or nodularity..  LYMPHATIC: Cervical, supraclavicular, and inguinal nodes without lymphadenopathy  MUSCULOSKELETAL: Moves all extremities, no obvious muscle wasting; no palpable masses, tenderness, or abnormalities to BUE  NEUROLOGIC: No gross deficits, normal gait  SKIN: Appropriate color for race, warm and dry, no rashes or lesions to unclothed skin  PSYCHIATRIC: Pleasant and interactive, affect bright, makes appropriate eye contact, thought process linear    Assessment/Plan:  1) Surveillance for recurrent endometrial cancer: No signs of recurrence. Continue surveillance every three months x2 years (through 9/2019) followed by every six months x3 years (through 9/2022) then annually thereafter with pelvic/rectal exam. Given the degree of stenosis and vaginal shortening, I do not suspect further use of a dilator would be helpful at this point. Will discuss with Dr. Gore whether or not imaging will be required for surveillance given impact of vaginal shortening and stenosis on quality pelvic exam. Reviewed signs and symptoms  of recurrence including but not limited to bleeding from vagina, bladder, or rectum, bloating, early satiety, swelling in the lower extremities, abdominal or lower back pain, changes in bowel or bladder patterns, shortness of breath, increased fatigue, unexplained weight loss, and night sweats. Reviewed recommendations from [facility] not to perform surveillance pap smears in women diagnosed with endometrial cancer as this does not improve detection of local recurrence. Reviewed signs and symptoms for when she should contact the clinic or seek additional care. Patient to contact the clinic with any questions or concerns in the interim.  2) Genetic testing: MMR protein expression intact making Burger syndrome unlikely.  3) Health maintenance issues discussed today include to follow up with PCP for co-morbid conditions and non-gynecologic  issues. To follow up with PCP for mammogram and annual wellness exam as well as elevated BP.  4) Patient verbalized understanding of and agreement with plan.    A total of 20 minutes of face to face time spent with patient, over 50% of which was spent in counseling and coordination of care.    ES Leary, FNP-C  Division of Gynecologic Oncology  [facility]  Pager: [phone]

## 2018-08-08 NOTE — NURSING NOTE
"Oncology Rooming Note    August 8, 2018 4:10 PM   Mary Anglin is a 48 year old female who presents for:    Chief Complaint   Patient presents with     RECHECK     3 mo      Initial Vitals: /85 (BP Location: Right arm, Patient Position: Sitting, Cuff Size: Adult Regular)  Pulse 80  Temp 98.6  F (37  C) (Oral)  Resp 16  Ht 1.626 m (5' 4.02\")  Wt 117.9 kg (259 lb 14.4 oz)  SpO2 94%  BMI 44.59 kg/m2 Estimated body mass index is 44.59 kg/(m^2) as calculated from the following:    Height as of this encounter: 1.626 m (5' 4.02\").    Weight as of this encounter: 117.9 kg (259 lb 14.4 oz). Body surface area is 2.31 meters squared.  No Pain (0) Comment: Data Unavailable   No LMP recorded. Patient has had a hysterectomy.  Allergies reviewed: Yes  Medications reviewed: Yes    Medications: Medication refills not needed today.  Pharmacy name entered into [employer]:    [id number] - [place], [place] - [address] [facility] - [place], [place] - [address] [place] [address] [id number] - [place], [place] - [address] [facility]    Clinical concerns: none      8 minutes for nursing intake (face to face time)     Regina AMAURY Richardson              "

## 2018-08-08 NOTE — MR AVS SNAPSHOT
"              After Visit Summary   8/8/2018    Mary Anglin    MRN: [id number]           Patient Information     Date Of Birth          1970        Visit Information        Provider Department      8/8/2018 4:00 PM Becca Pino APRN CNP [facility]        Today's Diagnoses     Encounter for follow-up surveillance of endometrial cancer    -  1       Follow-ups after your visit        Your next 10 appointments already scheduled     Nov 09, 2018  4:00 PM CST   (Arrive by 3:45 PM)   Return Visit with ES Leary CNP   [facility] ([facility])    [address]  [address]  [facility] [address] [phone]              Who to contact     If you have questions or need follow up information about today's clinic visit or your schedule please contact [facility] directly at [phone].  Normal or non-critical lab and imaging results will be communicated to you by MyChart, letter or phone within 4 business days after the clinic has received the results. If you do not hear from us within 7 days, please contact the clinic through [employer] or phone. If you have a critical or abnormal lab result, we will notify you by phone as soon as possible.  Submit refill requests through [employer] or call your pharmacy and they will forward the refill request to us. Please allow 3 business days for your refill to be completed.          Additional Information About Your Visit        MyChart Information     Personal lets you send messages to your doctor, view your test results, renew your prescriptions, schedule appointments and more. To sign up, go to [email or web address] . Click on \"Log in\" on the left side of the screen, which will take you to the Welcome page. Then click on \"Sign up Now\" on the right side of the page.     You will be asked to enter the access code listed below, as well as some personal information. " "Please follow the directions to create your username and password.     Your access code is: [email or web address]  Expires: 10/23/2018  6:31 AM     Your access code will  in 90 days. If you need help or a new code, please call your [facility] clinic or [phone].        Care EveryWhere ID     This is your Care EveryWhere ID. This could be used by other organizations to access your [facility] medical records  [id number]        Your Vitals Were     Pulse Temperature Respirations Height Pulse Oximetry BMI (Body Mass Index)    80 98.6  F (37  C) (Oral) 16 1.626 m (5' 4.02\") 94% 44.59 kg/m2       Blood Pressure from Last 3 Encounters:   18 150/85   18 134/81   18 121/73    Weight from Last 3 Encounters:   18 117.9 kg (259 lb 14.4 oz)   18 114.8 kg (253 lb)   18 121.6 kg (268 lb)              Today, you had the following     No orders found for display       Primary Care Provider Office Phone # Fax #    Mikel Wells [phone]       [place] [address]        Equal Access to Services     ALEX ROSAS AH: Hadii peggy soliz hadasho Soomaali, waaxda luqadaha, qaybta kaalmada adeegyada, loraine cifuentes hayefra turner . So [facility] [phone].    ATENCIÓN: Si habla español, tiene a do disposición servicios gratuitos de asistencia lingüística. Llame al [phone].    We comply with applicable federal civil rights laws and [place] laws. We do not discriminate on the basis of race, color, national origin, age, disability, sex, sexual orientation, or gender identity.            Thank you!     Thank you for choosing [facility]  for your care. Our goal is always to provide you with excellent care. Hearing back from our patients is one way we can continue to improve our services. Please take a few minutes to complete the written survey that you may receive in the mail after your visit with us. Thank you!      "        Your Updated Medication List - Protect others around you: Learn how to safely use, store and throw away your medicines at www.disposemymeds.org.          This list is accurate as of 8/8/18  4:41 PM.  Always use your most recent med list.                   Brand Name Dispense Instructions for use Diagnosis    ACCU-CHEK BRADLEY PLUS test strip   Generic drug:  blood glucose monitoring      1 time per day        albuterol 108 (90 Base) MCG/ACT Inhaler    PROAIR HFA/PROVENTIL HFA/VENTOLIN HFA     Inhale 2 puffs into the lungs every 4 hours as needed        ANTI-DIARRHEAL 2 MG capsule   Generic drug:  loperamide      Take 2 mg by mouth as needed for diarrhea        atorvastatin 10 MG tablet    LIPITOR     Take 0.5 tablets (5 mg total) by mouth every night at bedtime.        blood glucose monitoring meter device kit      1 Device        chlorhexidine 4 % liquid    HIBICLENS     Shower with daily for one month to prevent MRSA skin infection or overgrowth.        clobetasol 0.05 % cream    TEMOVATE          clotrimazole 1 % cream    LOTRIMIN    40 g    Apply topically 2 times daily    Fungal infection of skin, Endometrial cancer (H)       docusate sodium 100 MG capsule    COLACE     Take 100 mg by mouth 2 times daily as needed        doxepin 10 MG capsule    SINEquan     Take 20 mg by mouth At Bedtime        escitalopram 20 MG tablet    LEXAPRO     Take 20 mg by mouth At Bedtime        * FLUoxetine 20 MG capsule    PROzac     Take 10mg with 20mg to total 30mg by mouth 11PM.        * FLUoxetine 10 MG capsule    PROzac          furosemide 40 MG tablet    LASIX     Take 40 mg by mouth 2 times daily        hydrOXYzine 50 MG tablet    ATARAX          iohexol 140 MG/ML Soln solution    OMNIPAQUE    50 mL    Mix entire bottle (50ml) of contast with 600ml (20 ounces) of water and drink half 2 hrs prior to CT scan and half 1 hr prior to scan    Endometrial cancer (H)       Lancet Device Misc      As Directed        Melatonin 5  MG Tbdp      Take 2.5-5 mg by mouth nightly as needed        metFORMIN 500 MG tablet    GLUCOPHAGE     Take 500 mg by mouth daily (with dinner)        mupirocin 2 % ointment    BACTROBAN     Apply to affected area 2 times a day as needed for other (Specify) (rash on back of neck)        SILVRSTAT WOUND DRESSING Gel     85.05 g    Externally apply topically 2 times daily    Skin desquamation       * Notice:  This list has 2 medication(s) that are the same as other medications prescribed for you. Read the directions carefully, and ask your doctor or other care provider to review them with you.

## 2018-08-08 NOTE — PROGRESS NOTES
Gynecologic Oncology Follow-Up Visit    RE: Mary Anglin  MRN: [id number]  : 1970  Date of Visit: 2018    CC: Mary Anglin  is a 48 year old female with a history of recurrent endometrial cancer. She completed treatment with EBRT and HDR on 17. She presents today for a three month surveillance visit.    HPI:  Mary comes to the clinic feeling fair without gynecologic concerns. She is not sexually active and does not use her dilator- states she had a severe bout of depression which took precedence. Has since tried to use her dilator but states that it no longer fits. Due to see her PCP for wellness exam and for mammogram. Continues to see her psychiatrist regularly. Recently hospitalized for a UTI and cellulitis, resolved. Denies unintended weight loss, weakness, changes in vision or hearing, shortness of breath, cough, chest pain, abdominal pain, dyspepsia, nausea, vomiting, diarrhea, bloating, dysuria, urinary frequency or urgency, hematuria, pelvic pain, lower back pain, vaginal bleeding, vaginal discharge, numbness or tingling, or swelling of the extremities.    Oncology History:  : Diagnosed with stage IB grade 1 endometrial cancer. JEMAL/BSO/LND.  2017: Vaginal bleeding. Mass to vaginal wall biopsied and positive for recurrent endometrial adenocarcinoma.   2017: Completed EBRT and vaginal brachytherapy  18: PET impression:  IMPRESSION: In this patient with a history of recurrent endometrial  cancer status post hysterectomy, bilateral salpingo-oophorectomy,  pelvic sahra dissection, and radiotherapy:  1. No definite evidence of locally recurrent or metastatic disease in  the chest, abdomen, or pelvis.  2. Unchanged 5 mm groundglass nodule in the lingula, stable from  10/23/2017.  3. Mildly FDG avid soft tissue nodule in the right posterior arm  adipose tissue. Indeterminate. Attention on follow-up.    Past Medical History:   Diagnosis Date     Cellulitis of the lower  extremities.      Chronic edema of the lower extremities      Depression      Diabetes (H)      Endometrial cancer (H)      GERD (gastroesophageal reflux disease)      HLD (hyperlipidemia)      Morbid obesity (H)      MRSA (methicillin resistant Staphylococcus aureus) infection      Vertigo        Past Surgical History:   Procedure Laterality Date     C TOTAL ABDOM HYSTERECTOMY  2004     COLONOSCOPY       HERNIORRHAPHY UMBILICAL       INSERT RADIATION SEED MARKER CERVIX N/A 7/21/2017    Procedure: INSERT RADIATION SEED MARKER CERVIX;  Pelvic Exam, Insertion Radiation Gold Seed Markers;  Surgeon: Jarrod Kessler MD;  Location: [facility] OR     SALPINGO-OOPHORECTOMY BILATERAL       TONSILLECTOMY       UPPER GI ENDOSCOPY         Social History     Social History     Marital status: Single     Spouse name: N/A     Number of children: N/A     Years of education: N/A     Occupational History     Not on file.     Social History Main Topics     Smoking status: Never Smoker     Smokeless tobacco: Never Used     Alcohol use No     Drug use: No     Sexual activity: Not Currently     Other Topics Concern     Not on file     Social History Narrative       Family History   Problem Relation Age of Onset     Skin Cancer Father      KIDNEY DISEASE Father      Required hemodialysis     HEART DISEASE Maternal Grandfather      Mental Illness Mother      Catatonia after loss of mother     Breast Cancer Paternal Grandmother      Cerebrovascular Disease Maternal Grandmother      Hypertension Maternal Grandmother      Diabetes Maternal Grandmother      Diabetes Paternal Grandfather        Current Outpatient Prescriptions   Medication     albuterol (PROAIR HFA/PROVENTIL HFA/VENTOLIN HFA) 108 (90 BASE) MCG/ACT Inhaler     atorvastatin (LIPITOR) 10 MG tablet     blood glucose monitoring (ACCU-CHEK BRADLEY PLUS) meter device kit     blood glucose monitoring (ACCU-CHEK BRADLEY PLUS) test strip     clotrimazole (LOTRIMIN) 1 % cream     docusate sodium  (COLACE) 100 MG capsule     furosemide (LASIX) 40 MG tablet     Lancet Device MISC     Melatonin 5 MG TBDP     metFORMIN (GLUCOPHAGE) 500 MG tablet     mupirocin (BACTROBAN) 2 % ointment     doxepin (SINEQUAN) 10 MG capsule     escitalopram (LEXAPRO) 20 MG tablet     iohexol (OMNIPAQUE) 140 MG/ML SOLN solution     loperamide (ANTI-DIARRHEAL) 2 MG capsule     Silver (SILVRSTAT WOUND DRESSING) GEL     No current facility-administered medications for this visit.           Allergies   Allergen Reactions     Penicillin G Other (See Comments)     Azithromycin Rash     Benzonatate Unknown     Cephalexin Unknown     Sulfamethoxazole W/Trimethoprim Rash     Bupropion Other (See Comments)     No rash     Clindamycin Other (See Comments)     Aspirin Other (See Comments)     Pt refuses to take ASA         Review of Systems  General: Denies changes in weight, weakness, appetite changes, night sweats, hot flashes, fever, chills, fatigue, difficulty sleeping  HEENT: Denies headaches, hair loss, visual difficulty or disturbances, masses, head injury, tinnitus, hearing loss, epistaxis, congestion, problems with teeth or gums, dysphonia, or dysphagia  Pulmonary: Denies cough, sputum, hemoptysis, shortness of breath, dyspnea on exertion, wheezing, or allergies  Cardiovascular: Denies chest pain, fainting, palpitations, murmurs, activity intolerance, swelling in legs, or high blood pressure  Gastrointestinal: Denies constipation, nausea, vomiting, diarrhea, abdominal pain, bloating, heartburn, melena, hematochezia, or jaundice  Genitourinary: Denies dysuria, urinary urgency or frequency, hematuria, cloudy or malodorous urine, incontinence, repeat urinary tract infections, flank pain, pelvic pain, vaginal bleeding, vaginal discharge, or vaginal dryness  Sexual Function: N/A  Integumentary: Denies rashes, sores, changing moles, or scarring  Hematologic: Denies swollen lymph nodes, masses, easy bruising, or easy bleeding  Musculoskeletal:  "Denies falls, back pain, myalgias, arthralgias, stiffness, muscle weakness, or muscle cramps  Neurologic: Denies numbness or tingling, changes in memory, difficulty with walking, dizziness, seizures, or tremors  Psychiatric: + depression. Denies anxiety, mood changes, suicidal thoughts, or difficulty concentrating  Endocrine: Denies polydipsia, polyuria, temperature intolerance, or history of thyroid disease      OBJECTIVE:    Physical Exam:    /85 (BP Location: Right arm, Patient Position: Sitting, Cuff Size: Adult Regular)  Pulse 80  Temp 98.6  F (37  C) (Oral)  Resp 16  Ht 1.626 m (5' 4.02\")  Wt 117.9 kg (259 lb 14.4 oz)  SpO2 94%  BMI 44.59 kg/m2    CONSTITUTIONAL: Alert non-toxic appearing female in no acute distress  HEAD: Normocephalic, atraumatic  EYES: PERRLA; no scleral icterus  ENT: Oropharynx pink without lesions  NECK: Neck supple without lymphadenopathy  RESPIRATORY: Lungs clear to auscultation, no increased work of breathing noted  CV: Regular rate and rhythm, S1S2, no clicks, murmurs, rubs, or gallops; bilateral lower extremities without edema, dorsalis pedis pulses 2+ bilaterally  GASTROINTESTINAL: Normoactive bowel sounds x4 quadrants, abdomen obese, soft, non-distended, and non-tender to palpation without masses or organomegaly  GENITOURINARY: External genitalia and urethral meatus pink without lesions, masses, or excoriation. Vagina significantly foreshortened to <1cm in depth, able to admit one digit only limiting bimanual exam. Difficult to visualize vagina with adolescent speculum, but on limited view there were no apparent bleeding lesions, masses, or nodules noted or palpated to vaginal cuff. Cervix surgically absent. Rectovaginal exam demonstrates no rectovaginal masses or nodularity..  LYMPHATIC: Cervical, supraclavicular, and inguinal nodes without lymphadenopathy  MUSCULOSKELETAL: Moves all extremities, no obvious muscle wasting; no palpable masses, tenderness, or " abnormalities to BUE  NEUROLOGIC: No gross deficits, normal gait  SKIN: Appropriate color for race, warm and dry, no rashes or lesions to unclothed skin  PSYCHIATRIC: Pleasant and interactive, affect bright, makes appropriate eye contact, thought process linear    Assessment/Plan:  1) Surveillance for recurrent endometrial cancer: No signs of recurrence. Continue surveillance every three months x2 years (through 9/2019) followed by every six months x3 years (through 9/2022) then annually thereafter with pelvic/rectal exam. Given the degree of stenosis and vaginal shortening, I do not suspect further use of a dilator would be helpful at this point. Will discuss with Dr. Gore whether or not imaging will be required for surveillance given impact of vaginal shortening and stenosis on quality pelvic exam. Reviewed signs and symptoms  of recurrence including but not limited to bleeding from vagina, bladder, or rectum, bloating, early satiety, swelling in the lower extremities, abdominal or lower back pain, changes in bowel or bladder patterns, shortness of breath, increased fatigue, unexplained weight loss, and night sweats. Reviewed recommendations from [facility] not to perform surveillance pap smears in women diagnosed with endometrial cancer as this does not improve detection of local recurrence. Reviewed signs and symptoms for when she should contact the clinic or seek additional care. Patient to contact the clinic with any questions or concerns in the interim.  2) Genetic testing: MMR protein expression intact making Burger syndrome unlikely.  3) Health maintenance issues discussed today include to follow up with PCP for co-morbid conditions and non-gynecologic issues. To follow up with PCP for mammogram and annual wellness exam as well as elevated BP.  4) Patient verbalized understanding of and agreement with plan.    A total of 20 minutes of face to face time spent with patient, over 50% of which was spent in  counseling and coordination of care.    ES Leary, FNP-C  Division of Gynecologic Oncology  [facility]  Pager: [phone]

## 2018-08-09 PROBLEM — N89.5: Status: ACTIVE | Noted: 2018-08-09

## 2018-11-05 NOTE — PROGRESS NOTES
Gynecologic Oncology Follow-Up Visit    RE: Mary Anglin  MRN: [id number]  : 1970  Date of Visit: 2018    CC: Mary Anglin  is a 48 year old female with a history of recurrent endometrial cancer. She completed treatment with EBRT and HDR on 17. She presents today for a three month surveillance visit.    HPI:  Mary comes to the clinic feeling fair without gynecologic concerns. She is not sexually active and does not use her dilator- states she had a severe bout of depression which took precedence. Has since tried to use her dilator but states that it no longer fits. Up to date on mammogram and seeing her PCP. Continues to see her psychiatrist regularly. Currently doing PT for vertigo. Does have chronic issues with intermittent nausea, heartburn, diarrhea, abdominal pain, and muscle stiffness. Denies unintended weight loss, shortness of breath, cough, chest pain, vomiting, bloating, dysuria, urinary frequency or urgency, hematuria, pelvic pain, lower back pain, vaginal bleeding, vaginal discharge, numbness or tingling, or swelling of the extremities.    Oncology History:  : Diagnosed with stage IB grade 1 endometrial cancer. JEMAL/BSO/LND.  2017: Vaginal bleeding. Mass to vaginal wall biopsied and positive for recurrent endometrial adenocarcinoma.   2017: Completed EBRT and vaginal brachytherapy  18: PET impression:  IMPRESSION: In this patient with a history of recurrent endometrial  cancer status post hysterectomy, bilateral salpingo-oophorectomy,  pelvic sahra dissection, and radiotherapy:  1. No definite evidence of locally recurrent or metastatic disease in  the chest, abdomen, or pelvis.  2. Unchanged 5 mm groundglass nodule in the lingula, stable from  10/23/2017.  3. Mildly FDG avid soft tissue nodule in the right posterior arm  adipose tissue. Indeterminate. Attention on follow-up.    Past Medical History:   Diagnosis Date     Cellulitis of the lower extremities.       Chronic edema of the lower extremities      Depression      Diabetes (H)      Endometrial cancer (H)      GERD (gastroesophageal reflux disease)      HLD (hyperlipidemia)      Morbid obesity (H)      MRSA (methicillin resistant Staphylococcus aureus) infection      Vertigo        Past Surgical History:   Procedure Laterality Date     C TOTAL ABDOM HYSTERECTOMY  2004     COLONOSCOPY       HERNIORRHAPHY UMBILICAL       INSERT RADIATION SEED MARKER CERVIX N/A 7/21/2017    Procedure: INSERT RADIATION SEED MARKER CERVIX;  Pelvic Exam, Insertion Radiation Gold Seed Markers;  Surgeon: Jarrod Kessler MD;  Location: [facility] OR     SALPINGO-OOPHORECTOMY BILATERAL       TONSILLECTOMY       UPPER GI ENDOSCOPY         Social History     Social History     Marital status: Single     Spouse name: N/A     Number of children: N/A     Years of education: N/A     Occupational History     Not on file.     Social History Main Topics     Smoking status: Never Smoker     Smokeless tobacco: Never Used     Alcohol use No     Drug use: No     Sexual activity: Not Currently     Other Topics Concern     Not on file     Social History Narrative       Family History   Problem Relation Age of Onset     Skin Cancer Father      KIDNEY DISEASE Father      Required hemodialysis     HEART DISEASE Maternal Grandfather      Mental Illness Mother      Catatonia after loss of mother     Breast Cancer Paternal Grandmother      Cerebrovascular Disease Maternal Grandmother      Hypertension Maternal Grandmother      Diabetes Maternal Grandmother      Diabetes Paternal Grandfather        Current Outpatient Prescriptions   Medication     albuterol (PROAIR HFA/PROVENTIL HFA/VENTOLIN HFA) 108 (90 BASE) MCG/ACT Inhaler     atorvastatin (LIPITOR) 10 MG tablet     blood glucose monitoring (ACCU-CHEK BRADLEY PLUS) meter device kit     blood glucose monitoring (ACCU-CHEK BRADLEY PLUS) test strip     chlorhexidine (HIBICLENS) 4 % liquid     clobetasol (TEMOVATE) 0.05 %  cream     clotrimazole (LOTRIMIN) 1 % cream     docusate sodium (COLACE) 100 MG capsule     doxepin (SINEQUAN) 10 MG capsule     escitalopram (LEXAPRO) 20 MG tablet     FLUoxetine (PROZAC) 10 MG capsule     FLUoxetine (PROZAC) 20 MG capsule     furosemide (LASIX) 40 MG tablet     hydrOXYzine (ATARAX) 50 MG tablet     iohexol (OMNIPAQUE) 140 MG/ML SOLN solution     Lancet Device MISC     loperamide (ANTI-DIARRHEAL) 2 MG capsule     Melatonin 5 MG TBDP     metFORMIN (GLUCOPHAGE) 500 MG tablet     mupirocin (BACTROBAN) 2 % ointment     Silver (SILVRSTAT WOUND DRESSING) GEL     No current facility-administered medications for this visit.           Allergies   Allergen Reactions     Penicillin G Other (See Comments)     Azithromycin Rash     Benzonatate Unknown     Cephalexin Unknown     Sulfamethoxazole W/Trimethoprim Rash     Bupropion Other (See Comments)     No rash     Clindamycin Other (See Comments)     Aspirin Other (See Comments)     Pt refuses to take ASA         Review of Systems  General: Denies changes in weight, weakness, appetite changes, night sweats, hot flashes, fever, chills, fatigue, difficulty sleeping  HEENT: Denies headaches, hair loss, visual difficulty or disturbances, masses, head injury, tinnitus, hearing loss, epistaxis, congestion, problems with teeth or gums, dysphonia, or dysphagia  Pulmonary: Denies cough, sputum, hemoptysis, shortness of breath, dyspnea on exertion, wheezing, or allergies  Cardiovascular: Denies chest pain, fainting, palpitations, murmurs, activity intolerance, swelling in legs, or high blood pressure  Gastrointestinal: + nausea, diarrhea, abdominal pain, heartburn. Denies constipation, vomiting, bloating, melena, hematochezia, or jaundice  Genitourinary: Denies dysuria, urinary urgency or frequency, hematuria, cloudy or malodorous urine, incontinence, repeat urinary tract infections, flank pain, pelvic pain, vaginal bleeding, vaginal discharge, or vaginal dryness  Sexual  "Function: N/A  Integumentary: Denies rashes, sores, changing moles, or scarring  Hematologic: Denies swollen lymph nodes, masses, easy bruising, or easy bleeding  Musculoskeletal: + stiffness. Denies falls, back pain, myalgias, arthralgias, muscle weakness, or muscle cramps  Neurologic: + dizziness. Denies numbness or tingling, changes in memory, difficulty with walking, seizures, or tremors  Psychiatric: + depression. Denies anxiety, mood changes, suicidal thoughts, or difficulty concentrating  Endocrine: Denies polydipsia, polyuria, temperature intolerance, or history of thyroid disease      OBJECTIVE:    Physical Exam:    /74  Pulse 72  Temp 97.4  F (36.3  C) (Oral)  Resp 16  Ht 1.626 m (5' 4\")  Wt 120.2 kg (265 lb)  SpO2 96%  BMI 45.49 kg/m2    CONSTITUTIONAL: Alert non-toxic appearing female in no acute distress  HEAD: Normocephalic, atraumatic  NECK: Neck supple without lymphadenopathy  RESPIRATORY: Lungs clear to auscultation, no increased work of breathing noted  CV: Regular rate and rhythm, S1S2, no clicks, murmurs, rubs, or gallops; bilateral lower extremities obese without edema, dorsalis pedis pulses 2+ bilaterally  GASTROINTESTINAL: Normoactive bowel sounds x4 quadrants, abdomen obese, soft, non-distended, and non-tender to palpation without masses or organomegaly  GENITOURINARY: External genitalia and urethral meatus pink without lesions, masses, or excoriation. Vagina significantly foreshortened to <1cm in depth, able to admit one digit only limiting bimanual exam. Difficult to visualize vagina with adolescent speculum, but on limited view there were no apparent bleeding lesions, masses, or nodules noted or palpated to vaginal cuff. Cervix surgically absent. Rectovaginal exam demonstrates no rectovaginal masses or nodularity.  LYMPHATIC: Cervical, supraclavicular, and inguinal nodes without lymphadenopathy  MUSCULOSKELETAL: Moves all extremities, no obvious muscle wasting  NEUROLOGIC: No " gross deficits, normal gait  SKIN: Appropriate color for race, warm and dry, no rashes or lesions to unclothed skin  PSYCHIATRIC: Pleasant and interactive, affect bright, makes appropriate eye contact, thought process linear    Assessment/Plan:  1) Surveillance for recurrent endometrial cancer: No signs of recurrence- CT CAP obtained earlier today for pulmonary nodule follow up and further assessment given inability to perform adequate pelvic exam due to severe stenosis and vaginal foreshortening, results pending. Continue surveillance every three months x2 years (through 9/2019) followed by every six months x3 years (through 9/2022) then annually thereafter with pelvic/rectal exam. Given the degree of stenosis and vaginal shortening, I do not suspect further use of a dilator would be helpful at this point. She would like to have her next exam done with her OB GYN at home due to living far away and having to drive in the winter. To return in six months for surveillance with our team, earlier if she becomes symptomatic. Reviewed signs and symptoms  of recurrence including but not limited to bleeding from vagina, bladder, or rectum, bloating, early satiety, swelling in the lower extremities, abdominal or lower back pain, changes in bowel or bladder patterns, shortness of breath, increased fatigue, unexplained weight loss, and night sweats. Reviewed recommendations from [facility] not to perform surveillance pap smears in women diagnosed with endometrial cancer as this does not improve detection of local recurrence. Reviewed signs and symptoms for when she should contact the clinic or seek additional care. Patient to contact the clinic with any questions or concerns in the interim.  2) Genetic testing: MMR protein expression intact making Burger syndrome unlikely.  3) Health maintenance issues discussed today include to follow up with PCP for co-morbid conditions and non-gynecologic issues. Has received influenza vaccine this  season.  4) Patient verbalized understanding of and agreement with plan.    A total of 15 minutes of face to face time spent with patient, over 50% of which was spent in counseling and coordination of care.    ES Leary, MAIRAP-C  Division of Gynecologic Oncology  [facility]  Pager: [phone]

## 2018-11-09 ENCOUNTER — ONCOLOGY VISIT (OUTPATIENT)
Dept: ONCOLOGY | Facility: CLINIC | Age: 48
End: 2018-11-09
Attending: NURSE PRACTITIONER
Payer: MEDICAID

## 2018-11-09 ENCOUNTER — HOSPITAL ENCOUNTER (OUTPATIENT)
Dept: CT IMAGING | Facility: CLINIC | Age: 48
Discharge: HOME | End: 2018-11-09
Attending: NURSE PRACTITIONER | Admitting: NURSE PRACTITIONER
Payer: MEDICAID

## 2018-11-09 VITALS
HEART RATE: 72 BPM | DIASTOLIC BLOOD PRESSURE: 74 MMHG | BODY MASS INDEX: 45.24 KG/M2 | OXYGEN SATURATION: 96 % | HEIGHT: 64 IN | WEIGHT: 265 LBS | SYSTOLIC BLOOD PRESSURE: 120 MMHG | TEMPERATURE: 97.4 F | RESPIRATION RATE: 16 BRPM

## 2018-11-09 DIAGNOSIS — R91.8 PULMONARY NODULES: ICD-10-CM

## 2018-11-09 DIAGNOSIS — C54.1 ENDOMETRIAL CANCER (H): Primary | ICD-10-CM

## 2018-11-09 DIAGNOSIS — C54.1 ENDOMETRIAL CANCER (H): ICD-10-CM

## 2018-11-09 LAB
CREAT BLD-MCNC: 0.8 MG/DL (ref 0.52–1.04)
GFR SERPL CREATININE-BSD FRML MDRD: 76 ML/MIN/1.7M2

## 2018-11-09 PROCEDURE — 74177 CT ABD & PELVIS W/CONTRAST: CPT

## 2018-11-09 PROCEDURE — 25000125 ZZHC RX 250: Performed by: NURSE PRACTITIONER

## 2018-11-09 PROCEDURE — G0463 HOSPITAL OUTPT CLINIC VISIT: HCPCS | Mod: ZF

## 2018-11-09 PROCEDURE — G0463 HOSPITAL OUTPT CLINIC VISIT: HCPCS | Mod: 25

## 2018-11-09 PROCEDURE — 25000128 H RX IP 250 OP 636: Performed by: NURSE PRACTITIONER

## 2018-11-09 PROCEDURE — 99213 OFFICE O/P EST LOW 20 MIN: CPT | Mod: ZP | Performed by: NURSE PRACTITIONER

## 2018-11-09 PROCEDURE — 82565 ASSAY OF CREATININE: CPT

## 2018-11-09 RX ORDER — IOPAMIDOL 755 MG/ML
100 INJECTION, SOLUTION INTRAVASCULAR ONCE
Status: COMPLETED | OUTPATIENT
Start: 2018-11-09 | End: 2018-11-09

## 2018-11-09 RX ORDER — FLUOXETINE 40 MG/1
40 CAPSULE ORAL DAILY
COMMUNITY

## 2018-11-09 RX ADMIN — IOPAMIDOL 126 ML: 755 INJECTION, SOLUTION INTRAVENOUS at 14:28

## 2018-11-09 RX ADMIN — SODIUM CHLORIDE 72 ML: 9 INJECTION, SOLUTION INTRAVENOUS at 14:29

## 2018-11-09 ASSESSMENT — PAIN SCALES - GENERAL: PAINLEVEL: NO PAIN (0)

## 2018-11-09 NOTE — NURSING NOTE
"Oncology Rooming Note    November 9, 2018 4:05 PM   Mary Anglin is a 48 year old female who presents for:    Chief Complaint   Patient presents with     Oncology Clinic Visit     Return Endometrial Ca     Initial Vitals: /74  Pulse 72  Temp 97.4  F (36.3  C) (Oral)  Resp 16  Ht 1.626 m (5' 4\")  Wt 120.2 kg (265 lb)  SpO2 96%  BMI 45.49 kg/m2 Estimated body mass index is 45.49 kg/(m^2) as calculated from the following:    Height as of this encounter: 1.626 m (5' 4\").    Weight as of this encounter: 120.2 kg (265 lb). Body surface area is 2.33 meters squared.  No Pain (0) Comment: Data Unavailable   No LMP recorded. Patient has had a hysterectomy.  Allergies reviewed: Yes  Medications reviewed: Yes    Medications: Medication refills not needed today.  Pharmacy name entered into [employer]:    [id number] - [place], [place] - [address] [facility] - [place], [place] - [address] [facility] [id number] [facility] [place] [address] [facility]    Clinical concerns: no new concerns     6 minutes for nursing intake (face to face time)     Roselyn Andrews CMA              "

## 2018-11-09 NOTE — LETTER
2018       RE: Mary Anglin  [address]  [facility] [address]     Dear Colleague,    Thank you for referring your patient, Mary Anglin, to the [facility]. Please see a copy of my visit note below.      Gynecologic Oncology Follow-Up Visit    RE: Mary Anglin  MRN: [id number]  : 1970  Date of Visit: 2018    CC: Mary Anglin  is a 48 year old female with a history of recurrent endometrial cancer. She completed treatment with EBRT and HDR on 17. She presents today for a three month surveillance visit.    HPI:  Mary comes to the clinic feeling fair without gynecologic concerns. She is not sexually active and does not use her dilator- states she had a severe bout of depression which took precedence. Has since tried to use her dilator but states that it no longer fits. Up to date on mammogram and seeing her PCP. Continues to see her psychiatrist regularly. Currently doing PT for vertigo. Does have chronic issues with intermittent nausea, heartburn, diarrhea, abdominal pain, and muscle stiffness. Denies unintended weight loss, shortness of breath, cough, chest pain, vomiting, bloating, dysuria, urinary frequency or urgency, hematuria, pelvic pain, lower back pain, vaginal bleeding, vaginal discharge, numbness or tingling, or swelling of the extremities.    Oncology History:  : Diagnosed with stage IB grade 1 endometrial cancer. JEMAL/BSO/LND.  2017: Vaginal bleeding. Mass to vaginal wall biopsied and positive for recurrent endometrial adenocarcinoma.   2017: Completed EBRT and vaginal brachytherapy  18: PET impression:  IMPRESSION: In this patient with a history of recurrent endometrial  cancer status post hysterectomy, bilateral salpingo-oophorectomy,  pelvic sahra dissection, and radiotherapy:  1. No definite evidence of locally recurrent or metastatic disease in  the chest, abdomen, or pelvis.  2. Unchanged 5 mm groundglass nodule  in the lingula, stable from  10/23/2017.  3. Mildly FDG avid soft tissue nodule in the right posterior arm  adipose tissue. Indeterminate. Attention on follow-up.    Past Medical History:   Diagnosis Date     Cellulitis of the lower extremities.      Chronic edema of the lower extremities      Depression      Diabetes (H)      Endometrial cancer (H)      GERD (gastroesophageal reflux disease)      HLD (hyperlipidemia)      Morbid obesity (H)      MRSA (methicillin resistant Staphylococcus aureus) infection      Vertigo        Past Surgical History:   Procedure Laterality Date     C TOTAL ABDOM HYSTERECTOMY  2004     COLONOSCOPY       HERNIORRHAPHY UMBILICAL       INSERT RADIATION SEED MARKER CERVIX N/A 7/21/2017    Procedure: INSERT RADIATION SEED MARKER CERVIX;  Pelvic Exam, Insertion Radiation Gold Seed Markers;  Surgeon: Jarrod Kessler MD;  Location: [facility] OR     SALPINGO-OOPHORECTOMY BILATERAL       TONSILLECTOMY       UPPER GI ENDOSCOPY         Social History     Social History     Marital status: Single     Spouse name: N/A     Number of children: N/A     Years of education: N/A     Occupational History     Not on file.     Social History Main Topics     Smoking status: Never Smoker     Smokeless tobacco: Never Used     Alcohol use No     Drug use: No     Sexual activity: Not Currently     Other Topics Concern     Not on file     Social History Narrative       Family History   Problem Relation Age of Onset     Skin Cancer Father      KIDNEY DISEASE Father      Required hemodialysis     HEART DISEASE Maternal Grandfather      Mental Illness Mother      Catatonia after loss of mother     Breast Cancer Paternal Grandmother      Cerebrovascular Disease Maternal Grandmother      Hypertension Maternal Grandmother      Diabetes Maternal Grandmother      Diabetes Paternal Grandfather        Current Outpatient Prescriptions   Medication     albuterol (PROAIR HFA/PROVENTIL HFA/VENTOLIN HFA) 108 (90 BASE) MCG/ACT Inhaler      atorvastatin (LIPITOR) 10 MG tablet     blood glucose monitoring (ACCU-CHEK BRADLEY PLUS) meter device kit     blood glucose monitoring (ACCU-CHEK BRADLEY PLUS) test strip     chlorhexidine (HIBICLENS) 4 % liquid     clobetasol (TEMOVATE) 0.05 % cream     clotrimazole (LOTRIMIN) 1 % cream     docusate sodium (COLACE) 100 MG capsule     doxepin (SINEQUAN) 10 MG capsule     escitalopram (LEXAPRO) 20 MG tablet     FLUoxetine (PROZAC) 10 MG capsule     FLUoxetine (PROZAC) 20 MG capsule     furosemide (LASIX) 40 MG tablet     hydrOXYzine (ATARAX) 50 MG tablet     iohexol (OMNIPAQUE) 140 MG/ML SOLN solution     Lancet Device MISC     loperamide (ANTI-DIARRHEAL) 2 MG capsule     Melatonin 5 MG TBDP     metFORMIN (GLUCOPHAGE) 500 MG tablet     mupirocin (BACTROBAN) 2 % ointment     Silver (SILVRSTAT WOUND DRESSING) GEL     No current facility-administered medications for this visit.           Allergies   Allergen Reactions     Penicillin G Other (See Comments)     Azithromycin Rash     Benzonatate Unknown     Cephalexin Unknown     Sulfamethoxazole W/Trimethoprim Rash     Bupropion Other (See Comments)     No rash     Clindamycin Other (See Comments)     Aspirin Other (See Comments)     Pt refuses to take ASA         Review of Systems  General: Denies changes in weight, weakness, appetite changes, night sweats, hot flashes, fever, chills, fatigue, difficulty sleeping  HEENT: Denies headaches, hair loss, visual difficulty or disturbances, masses, head injury, tinnitus, hearing loss, epistaxis, congestion, problems with teeth or gums, dysphonia, or dysphagia  Pulmonary: Denies cough, sputum, hemoptysis, shortness of breath, dyspnea on exertion, wheezing, or allergies  Cardiovascular: Denies chest pain, fainting, palpitations, murmurs, activity intolerance, swelling in legs, or high blood pressure  Gastrointestinal: + nausea, diarrhea, abdominal pain, heartburn. Denies constipation, vomiting, bloating, melena, hematochezia, or  "jaundice  Genitourinary: Denies dysuria, urinary urgency or frequency, hematuria, cloudy or malodorous urine, incontinence, repeat urinary tract infections, flank pain, pelvic pain, vaginal bleeding, vaginal discharge, or vaginal dryness  Sexual Function: N/A  Integumentary: Denies rashes, sores, changing moles, or scarring  Hematologic: Denies swollen lymph nodes, masses, easy bruising, or easy bleeding  Musculoskeletal: + stiffness. Denies falls, back pain, myalgias, arthralgias, muscle weakness, or muscle cramps  Neurologic: + dizziness. Denies numbness or tingling, changes in memory, difficulty with walking, seizures, or tremors  Psychiatric: + depression. Denies anxiety, mood changes, suicidal thoughts, or difficulty concentrating  Endocrine: Denies polydipsia, polyuria, temperature intolerance, or history of thyroid disease      OBJECTIVE:    Physical Exam:    /74  Pulse 72  Temp 97.4  F (36.3  C) (Oral)  Resp 16  Ht 1.626 m (5' 4\")  Wt 120.2 kg (265 lb)  SpO2 96%  BMI 45.49 kg/m2    CONSTITUTIONAL: Alert non-toxic appearing female in no acute distress  HEAD: Normocephalic, atraumatic  NECK: Neck supple without lymphadenopathy  RESPIRATORY: Lungs clear to auscultation, no increased work of breathing noted  CV: Regular rate and rhythm, S1S2, no clicks, murmurs, rubs, or gallops; bilateral lower extremities obese without edema, dorsalis pedis pulses 2+ bilaterally  GASTROINTESTINAL: Normoactive bowel sounds x4 quadrants, abdomen obese, soft, non-distended, and non-tender to palpation without masses or organomegaly  GENITOURINARY: External genitalia and urethral meatus pink without lesions, masses, or excoriation. Vagina significantly foreshortened to <1cm in depth, able to admit one digit only limiting bimanual exam. Difficult to visualize vagina with adolescent speculum, but on limited view there were no apparent bleeding lesions, masses, or nodules noted or palpated to vaginal cuff. Cervix " surgically absent. Rectovaginal exam demonstrates no rectovaginal masses or nodularity.  LYMPHATIC: Cervical, supraclavicular, and inguinal nodes without lymphadenopathy  MUSCULOSKELETAL: Moves all extremities, no obvious muscle wasting  NEUROLOGIC: No gross deficits, normal gait  SKIN: Appropriate color for race, warm and dry, no rashes or lesions to unclothed skin  PSYCHIATRIC: Pleasant and interactive, affect bright, makes appropriate eye contact, thought process linear    Assessment/Plan:  1) Surveillance for recurrent endometrial cancer: No signs of recurrence- CT CAP obtained earlier today for pulmonary nodule follow up and further assessment given inability to perform adequate pelvic exam due to severe stenosis and vaginal foreshortening, results pending. Continue surveillance every three months x2 years (through 9/2019) followed by every six months x3 years (through 9/2022) then annually thereafter with pelvic/rectal exam. Given the degree of stenosis and vaginal shortening, I do not suspect further use of a dilator would be helpful at this point. She would like to have her next exam done with her OB GYN at home due to living far away and having to drive in the winter. To return in six months for surveillance with our team, earlier if she becomes symptomatic. Reviewed signs and symptoms  of recurrence including but not limited to bleeding from vagina, bladder, or rectum, bloating, early satiety, swelling in the lower extremities, abdominal or lower back pain, changes in bowel or bladder patterns, shortness of breath, increased fatigue, unexplained weight loss, and night sweats. Reviewed recommendations from [facility] not to perform surveillance pap smears in women diagnosed with endometrial cancer as this does not improve detection of local recurrence. Reviewed signs and symptoms for when she should contact the clinic or seek additional care. Patient to contact the clinic with any questions or concerns in the  interim.  2) Genetic testing: MMR protein expression intact making Burger syndrome unlikely.  3) Health maintenance issues discussed today include to follow up with PCP for co-morbid conditions and non-gynecologic issues. Has received influenza vaccine this season.  4) Patient verbalized understanding of and agreement with plan.    A total of 15 minutes of face to face time spent with patient, over 50% of which was spent in counseling and coordination of care.    ES Leary, FNP-C  Division of Gynecologic Oncology  [facility]  Pager: [phone]

## 2018-11-09 NOTE — MR AVS SNAPSHOT
After Visit Summary   11/9/2018    Mary Anglin    MRN: [id number]           Patient Information     Date Of Birth          1970        Visit Information        Provider Department      11/9/2018 4:00 PM Becca Pino APRN CNP [facility]        Today's Diagnoses     Endometrial cancer (H)    -  1    Pulmonary nodules           Follow-ups after your visit        Follow-up notes from your care team     Return in about 6 months (around 5/9/2019), or if symptoms worsen or fail to improve, for surveillance with Dr. Gore.      Your next 10 appointments already scheduled     May 15, 2019  5:00 PM CDT   (Arrive by 4:45 PM)   Return Visit with Abiodun Gore MD   [facility] ([facility])    [address] [place] [address]  [address] [place] [id number] [phone]              Future tests that were ordered for you today     Open Standing Orders        Priority Remaining Interval Expires Ordered    Creatinine POCT Routine 1/1 AM DRAW  11/9/2018          Open Future Orders        Priority Expected Expires Ordered    CT Chest/Abdomen/Pelvis w Contrast Routine  11/5/2019 11/5/2018            Who to contact     If you have questions or need follow up information about today's clinic visit or your schedule please contact [facility] directly at [phone].  Normal or non-critical lab and imaging results will be communicated to you by MyChart, letter or phone within 4 business days after the clinic has received the results. If you do not hear from us within 7 days, please contact the clinic through MyChart or phone. If you have a critical or abnormal lab result, we will notify you by phone as soon as possible.  Submit refill requests through [facility] or call your pharmacy and they will forward the refill request to us. Please allow 3 business days for your refill to be completed.          Additional  "Information About Your Visit        MyChart Information     [employer] lets you send messages to your doctor, view your test results, renew your prescriptions, schedule appointments and more. To sign up, go to [email or web address] . Click on \"Log in\" on the left side of the screen, which will take you to the Welcome page. Then click on \"Sign up Now\" on the right side of the page.     You will be asked to enter the access code listed below, as well as some personal information. Please follow the directions to create your username and password.     Your access code is: [email or web address]  Expires: 2019  5:30 AM     Your access code will  in 90 days. If you need help or a new code, please call your [place] clinic or [phone].        Care EveryWhere ID     This is your Care EveryWhere ID. This could be used by other organizations to access your [place] medical records  [id number]        Your Vitals Were     Pulse Temperature Respirations Height Pulse Oximetry BMI (Body Mass Index)    72 97.4  F (36.3  C) (Oral) 16 1.626 m (5' 4\") 96% 45.49 kg/m2       Blood Pressure from Last 3 Encounters:   18 120/74   18 150/85   18 134/81    Weight from Last 3 Encounters:   18 120.2 kg (265 lb)   18 117.9 kg (259 lb 14.4 oz)   18 114.8 kg (253 lb)                 Today's Medication Changes          These changes are accurate as of 18  4:41 PM.  If you have any questions, ask your nurse or doctor.               These medicines have changed or have updated prescriptions.        Dose/Directions    FLUoxetine 40 MG capsule   Commonly known as:  PROzac   This may have changed:  Another medication with the same name was removed. Continue taking this medication, and follow the directions you see here.   Changed by:  Becca Pino APRN CNP        Dose:  40 mg   Take 40 mg by mouth daily   Refills:  0                Primary Care Provider Office Phone # Fax #    Mikel Wells, " [phone]       [facility] [address] [facility] [address]        Equal Access to Services     ALEX ROSAS : Hadii aad ku hadkadenbob Coon, deshaunmatt haganbalha, jessica ineseagleda alexmackenzie, waxmiquel nohemyin hayaakris johnsondorian kishaalejandracarlos harris. So [facility] [phone].    ATENCIÓN: Si habla español, tiene a do disposición servicios gratuitos de asistencia lingüística. Llame al [phone].    We comply with applicable federal civil rights laws and [place] laws. We do not discriminate on the basis of race, color, national origin, age, disability, sex, sexual orientation, or gender identity.            Thank you!     Thank you for choosing [facility]  for your care. Our goal is always to provide you with excellent care. Hearing back from our patients is one way we can continue to improve our services. Please take a few minutes to complete the written survey that you may receive in the mail after your visit with us. Thank you!             Your Updated Medication List - Protect others around you: Learn how to safely use, store and throw away your medicines at www.disposemymeds.org.          This list is accurate as of 11/9/18  4:41 PM.  Always use your most recent med list.                   Brand Name Dispense Instructions for use Diagnosis    albuterol 108 (90 Base) MCG/ACT inhaler    PROAIR HFA/PROVENTIL HFA/VENTOLIN HFA     Inhale 2 puffs into the lungs every 4 hours as needed        ANTI-DIARRHEAL 2 MG capsule   Generic drug:  loperamide      Take 2 mg by mouth as needed for diarrhea        atorvastatin 10 MG tablet    LIPITOR     Take 0.5 tablets (5 mg total) by mouth every night at bedtime.        chlorhexidine 4 % liquid    HIBICLENS     Shower with daily for one month to prevent MRSA skin infection or overgrowth.        clobetasol 0.05 % cream    TEMOVATE          clotrimazole 1 % cream    LOTRIMIN    40 g    Apply topically 2 times daily    Fungal infection of skin,  Endometrial cancer (H)       docusate sodium 100 MG capsule    COLACE     Take 100 mg by mouth 2 times daily as needed        FLUoxetine 40 MG capsule    PROzac     Take 40 mg by mouth daily        furosemide 40 MG tablet    LASIX     Take 40 mg by mouth 2 times daily        hydrOXYzine 50 MG tablet    ATARAX          Melatonin 5 MG Tbdp      Take 2.5-5 mg by mouth nightly as needed        metFORMIN 500 MG tablet    GLUCOPHAGE     Take 500 mg by mouth daily (with dinner)

## 2018-11-15 ENCOUNTER — TELEPHONE (OUTPATIENT)
Dept: SPIRITUAL SERVICES | Facility: CLINIC | Age: 48
End: 2018-11-15

## 2018-11-15 NOTE — TELEPHONE ENCOUNTER
"SPIRITUAL HEALTH SERVICES  Telephone Encounter     Reason: Referred to contact Mary through her response to the oncology distress screen. \"Do you struggle with the loss of meaning and yvon in your life? : Quite a bit\"    Intervention: Reviewed documentation. I contacted Mary and left a voicemail.    Plan: I have no plan for follow-up.    Michele Bowman MDiv, [facility]  Staff       "

## 2019-04-26 DIAGNOSIS — R91.8 PULMONARY NODULES: Primary | ICD-10-CM

## 2019-04-26 DIAGNOSIS — C54.1 ENDOMETRIAL CANCER (H): ICD-10-CM

## 2019-07-23 ENCOUNTER — ANCILLARY PROCEDURE (OUTPATIENT)
Dept: CT IMAGING | Facility: CLINIC | Age: 49
End: 2019-07-23
Attending: OBSTETRICS & GYNECOLOGY
Payer: MEDICAID

## 2019-07-23 DIAGNOSIS — R91.8 PULMONARY NODULES: ICD-10-CM

## 2019-07-23 DIAGNOSIS — C54.1 ENDOMETRIAL CANCER (H): ICD-10-CM

## 2019-07-23 LAB
CREAT BLD-MCNC: 0.8 MG/DL (ref 0.52–1.04)
GFR SERPL CREATININE-BSD FRML MDRD: 76 ML/MIN/{1.73_M2}

## 2019-07-23 RX ORDER — IOPAMIDOL 755 MG/ML
200 INJECTION, SOLUTION INTRAVASCULAR ONCE
Status: COMPLETED | OUTPATIENT
Start: 2019-07-23 | End: 2019-07-23

## 2019-07-23 RX ADMIN — IOPAMIDOL 125 ML: 755 INJECTION, SOLUTION INTRAVASCULAR at 15:13

## 2019-07-24 ENCOUNTER — ONCOLOGY VISIT (OUTPATIENT)
Dept: ONCOLOGY | Facility: CLINIC | Age: 49
End: 2019-07-24
Attending: OBSTETRICS & GYNECOLOGY
Payer: MEDICAID

## 2019-07-24 VITALS
HEART RATE: 81 BPM | OXYGEN SATURATION: 94 % | SYSTOLIC BLOOD PRESSURE: 153 MMHG | TEMPERATURE: 97.8 F | WEIGHT: 270.2 LBS | BODY MASS INDEX: 46.38 KG/M2 | DIASTOLIC BLOOD PRESSURE: 84 MMHG

## 2019-07-24 DIAGNOSIS — C54.1 ENDOMETRIAL CANCER (H): Primary | ICD-10-CM

## 2019-07-24 DIAGNOSIS — C54.1 ENDOMETRIAL CANCER (H): ICD-10-CM

## 2019-07-24 LAB
BASOPHILS # BLD AUTO: 0 10E9/L (ref 0–0.2)
BASOPHILS NFR BLD AUTO: 1 %
DIFFERENTIAL METHOD BLD: ABNORMAL
EOSINOPHIL # BLD AUTO: 0.3 10E9/L (ref 0–0.7)
EOSINOPHIL NFR BLD AUTO: 8.2 %
ERYTHROCYTE [DISTWIDTH] IN BLOOD BY AUTOMATED COUNT: 12.5 % (ref 10–15)
HCT VFR BLD AUTO: 40.7 % (ref 35–47)
HGB BLD-MCNC: 13.7 G/DL (ref 11.7–15.7)
IMM GRANULOCYTES # BLD: 0 10E9/L (ref 0–0.4)
IMM GRANULOCYTES NFR BLD: 0.5 %
LYMPHOCYTES # BLD AUTO: 1.1 10E9/L (ref 0.8–5.3)
LYMPHOCYTES NFR BLD AUTO: 27.2 %
MCH RBC QN AUTO: 32.1 PG (ref 26.5–33)
MCHC RBC AUTO-ENTMCNC: 33.7 G/DL (ref 31.5–36.5)
MCV RBC AUTO: 95 FL (ref 78–100)
MONOCYTES # BLD AUTO: 0.3 10E9/L (ref 0–1.3)
MONOCYTES NFR BLD AUTO: 8 %
NEUTROPHILS # BLD AUTO: 2.2 10E9/L (ref 1.6–8.3)
NEUTROPHILS NFR BLD AUTO: 55.1 %
NRBC # BLD AUTO: 0 10*3/UL
NRBC BLD AUTO-RTO: 0 /100
PLATELET # BLD AUTO: 141 10E9/L (ref 150–450)
RBC # BLD AUTO: 4.27 10E12/L (ref 3.8–5.2)
WBC # BLD AUTO: 4 10E9/L (ref 4–11)

## 2019-07-24 PROCEDURE — 36415 COLL VENOUS BLD VENIPUNCTURE: CPT | Performed by: OBSTETRICS & GYNECOLOGY

## 2019-07-24 PROCEDURE — G0463 HOSPITAL OUTPT CLINIC VISIT: HCPCS | Mod: ZF

## 2019-07-24 PROCEDURE — 99214 OFFICE O/P EST MOD 30 MIN: CPT | Mod: ZP | Performed by: OBSTETRICS & GYNECOLOGY

## 2019-07-24 PROCEDURE — 85025 COMPLETE CBC W/AUTO DIFF WBC: CPT | Performed by: OBSTETRICS & GYNECOLOGY

## 2019-07-24 RX ORDER — ATORVASTATIN CALCIUM 10 MG/1
10 TABLET, FILM COATED ORAL DAILY
COMMUNITY
Start: 2019-02-05

## 2019-07-24 RX ORDER — BUPROPION HYDROCHLORIDE 300 MG/1
300 TABLET ORAL DAILY
COMMUNITY
Start: 2019-06-18 | End: 2020-06-22

## 2019-07-24 RX ORDER — METFORMIN HCL 500 MG
500 TABLET, EXTENDED RELEASE 24 HR ORAL DAILY
COMMUNITY
Start: 2019-01-08

## 2019-07-24 RX ORDER — NYSTATIN 100000 [USP'U]/G
100000 POWDER TOPICAL
COMMUNITY
Start: 2019-05-29

## 2019-07-24 RX ORDER — FOLIC ACID 1 MG/1
1 TABLET ORAL DAILY
COMMUNITY
Start: 2019-06-20

## 2019-07-24 ASSESSMENT — PAIN SCALES - GENERAL: PAINLEVEL: NO PAIN (0)

## 2019-07-24 NOTE — NURSING NOTE
"Oncology Rooming Note    July 24, 2019 3:52 PM   Mary Anglin is a 49 year old female who presents for:    Chief Complaint   Patient presents with     Oncology Clinic Visit     Endometrial Cancer     Initial Vitals: /84   Pulse 81   Temp 97.8  F (36.6  C) (Oral)   Wt 122.6 kg (270 lb 3.2 oz)   SpO2 94%   BMI 46.38 kg/m   Estimated body mass index is 46.38 kg/m  as calculated from the following:    Height as of 11/9/18: 1.626 m (5' 4\").    Weight as of this encounter: 122.6 kg (270 lb 3.2 oz). Body surface area is 2.35 meters squared.  No Pain (0) Comment: Data Unavailable   No LMP recorded. Patient has had a hysterectomy.  Allergies reviewed: Yes  Medications reviewed: Yes    Medications: Medication refills not needed today.  Pharmacy name entered into [employer]:    [id number] - [place], [place] - [address] [facility] - [place], [place] - [address] [facility] [id number] [place] [address] [facility]    Clinical concerns: n/a       MICHAEL LUNDBERG CMA              "

## 2019-07-24 NOTE — LETTER
2019     RE: Mary Anglin  [address]  [facility] [address]     Dear Colleague,    Thank you for referring your patient, Mary Anglin, to the [facility]. Please see a copy of my visit note below.                Follow Up Notes on Referred Patient    Date: 2019       Dr. Mikel Wells MD  [facility]  [address] [address]       RE: Mary Anglin  : 1970  VALENTE: 2019    Dear Dr. Mikel Wells:    Mary Anglin is a 47 year old woman with a diagnosis of  recurrent endometrial cancer.     Patient presents today for followup.  She has noticed some dizziness fatigues over the last 2 month. No nausea, vomiting, fever or chills.  Normal urinary and bowel functions.  No vaginal bleeding.          Past Medical History:    Past Medical History:   Diagnosis Date     Cellulitis of the lower extremities.      Chronic edema of the lower extremities      Depression      Diabetes (H)      Endometrial cancer (H)      GERD (gastroesophageal reflux disease)      HLD (hyperlipidemia)      Morbid obesity (H)      MRSA (methicillin resistant Staphylococcus aureus) infection      Vertigo          Past Surgical History:    Past Surgical History:   Procedure Laterality Date     C TOTAL ABDOM HYSTERECTOMY       COLONOSCOPY       HERNIORRHAPHY UMBILICAL       INSERT RADIATION SEED MARKER CERVIX N/A 2017    Procedure: INSERT RADIATION SEED MARKER CERVIX;  Pelvic Exam, Insertion Radiation Gold Seed Markers;  Surgeon: Jarrod Kessler MD;  Location: [facility] OR     SALPINGO-OOPHORECTOMY BILATERAL       TONSILLECTOMY       UPPER GI ENDOSCOPY           Health Maintenance Due   Topic Date Due     PREVENTIVE CARE VISIT  1969     MAMMO SCREENING  1969     PAP  1969     COLONOSCOPY  1979     HIV SCREENING  1984     LIPID  2014     PHQ-2  2019     ZOSTER IMMUNIZATION (1 of 2) 2019       Current  Medications:     Current Outpatient Medications   Medication Sig Dispense Refill     atorvastatin (LIPITOR) 10 MG tablet Take 10 mg by mouth daily Half tab       buPROPion (WELLBUTRIN XL) 300 MG 24 hr tablet Take 300 mg by mouth daily       folic acid (FOLVITE) 1 MG tablet Take 1 mg by mouth daily       nystatin (MYCOSTATIN) 061506 UNIT/GM external powder Apply 100,000 Units topically       omeprazole (PRILOSEC) 20 MG DR capsule Take 20 mg by mouth       albuterol (PROAIR HFA/PROVENTIL HFA/VENTOLIN HFA) 108 (90 BASE) MCG/ACT Inhaler Inhale 2 puffs into the lungs every 4 hours as needed        chlorhexidine (HIBICLENS) 4 % liquid Shower with daily for one month to prevent MRSA skin infection or overgrowth.       clobetasol (TEMOVATE) 0.05 % cream        clotrimazole (LOTRIMIN) 1 % cream Apply topically 2 times daily 40 g 1     docusate sodium (COLACE) 100 MG capsule Take 100 mg by mouth 2 times daily as needed        FLUoxetine (PROZAC) 40 MG capsule Take 40 mg by mouth daily       furosemide (LASIX) 40 MG tablet Take 40 mg by mouth 2 times daily        hydrOXYzine (ATARAX) 50 MG tablet        loperamide (ANTI-DIARRHEAL) 2 MG capsule Take 2 mg by mouth as needed for diarrhea       Melatonin 5 MG TBDP Take 2.5-5 mg by mouth nightly as needed        metFORMIN (GLUCOPHAGE-XR) 500 MG 24 hr tablet Take 500 mg by mouth daily           Allergies:        Allergies   Allergen Reactions     Penicillin G Other (See Comments)     Azithromycin Rash     Benzonatate Unknown     Cephalexin Unknown     Sulfamethoxazole W/Trimethoprim Rash     Bupropion Other (See Comments)     No rash     Clindamycin Other (See Comments)     Aspirin Other (See Comments)     Pt refuses to take ASA        Social History:     Social History     Tobacco Use     Smoking status: Never Smoker     Smokeless tobacco: Never Used   Substance Use Topics     Alcohol use: No       History   Drug Use No         Family History:       Family History   Problem  Relation Age of Onset     Skin Cancer Father      Kidney Disease Father         Required hemodialysis     Heart Disease Maternal Grandfather      Mental Illness Mother         Catatonia after loss of mother     Breast Cancer Paternal Grandmother      Cerebrovascular Disease Maternal Grandmother      Hypertension Maternal Grandmother      Diabetes Maternal Grandmother      Diabetes Paternal Grandfather          Physical Exam:     /84   Pulse 81   Temp 97.8  F (36.6  C) (Oral)   Wt 122.6 kg (270 lb 3.2 oz)   SpO2 94%   BMI 46.38 kg/m     Body mass index is 46.38 kg/m .    General Appearance:  healthy and alert, no distress     Musculoskeletal:         extremities non tender and without edema     Skin:    no lesions or rashes or lesions back side of right arm     Neurological:   normal gait, no gross defects                Psychiatric:      appropriate mood and affect                         ABDOMEN:  Obese.  No organomegaly.     PELVIC: Deferred per patient request.                      Assessment:     Mary Anglin is a 47 year old woman with a diagnosis of recurrent endometrial cancer.      A total of  25 minutes was spent with the patient, 20 minutes of which were spent in counseling the patient and/or treatment planning.     1.  Recurrent endometrial cancer, Patient has had definitive radiation treatment for recurrent endometrial cancer.   2.  No evidence of disease.   3. Stable 4 mm lung nodules  4.  Class 3 obesity.      We will check a CBC today to evaluate her dizziness.  Discussed with the patient we will see her back in 3-4 months and does not need any further scan. Also discussed to keep using vaginal dilator, as she has started having some vaginal atrophy, shortening and early stenosis.  The patient agrees with the plan, is very appreciative of her care.  All questions were answered.         Abiodun Gore MD, MS    Department of Obstetrics and Gynecology    Division of Gynecologic Oncology   [facility]  Phone: [phone]    CC  Patient Care Team:  Maryse Marti MD as PCP - General (Family Practice)  Chris Roberson as Referring Physician (OB/Gyn)  MARYSE MARTI

## 2019-07-25 NOTE — PROGRESS NOTES
Follow Up Notes on Referred Patient    Date: 2019       Dr. Mikel Wells MD  [address] [address]       RE: Mary Anglin  : 1970  VALENTE: 2019    Dear Dr. Mikel Wells:    Mary Anglin is a 47 year old woman with a diagnosis of  recurrent endometrial cancer.     Patient presents today for followup.  She has noticed some dizziness fatigues over the last 2 month. No nausea, vomiting, fever or chills.  Normal urinary and bowel functions.  No vaginal bleeding.          Past Medical History:    Past Medical History:   Diagnosis Date     Cellulitis of the lower extremities.      Chronic edema of the lower extremities      Depression      Diabetes (H)      Endometrial cancer (H)      GERD (gastroesophageal reflux disease)      HLD (hyperlipidemia)      Morbid obesity (H)      MRSA (methicillin resistant Staphylococcus aureus) infection      Vertigo          Past Surgical History:    Past Surgical History:   Procedure Laterality Date     C TOTAL ABDOM HYSTERECTOMY       COLONOSCOPY       HERNIORRHAPHY UMBILICAL       INSERT RADIATION SEED MARKER CERVIX N/A 2017    Procedure: INSERT RADIATION SEED MARKER CERVIX;  Pelvic Exam, Insertion Radiation Gold Seed Markers;  Surgeon: Jarrod Kessler MD;  Location: [facility] OR     SALPINGO-OOPHORECTOMY BILATERAL       TONSILLECTOMY       UPPER GI ENDOSCOPY           Health Maintenance Due   Topic Date Due     PREVENTIVE CARE VISIT  1969     MAMMO SCREENING  1969     PAP  1969     COLONOSCOPY  1979     HIV SCREENING  1984     LIPID  2014     PHQ-2  2019     ZOSTER IMMUNIZATION (1 of 2) 2019       Current Medications:     Current Outpatient Medications   Medication Sig Dispense Refill     atorvastatin (LIPITOR) 10 MG tablet Take 10 mg by mouth daily Half tab       buPROPion (WELLBUTRIN XL) 300 MG 24 hr tablet Take 300 mg by mouth daily        folic acid (FOLVITE) 1 MG tablet Take 1 mg by mouth daily       nystatin (MYCOSTATIN) 171392 UNIT/GM external powder Apply 100,000 Units topically       omeprazole (PRILOSEC) 20 MG DR capsule Take 20 mg by mouth       albuterol (PROAIR HFA/PROVENTIL HFA/VENTOLIN HFA) 108 (90 BASE) MCG/ACT Inhaler Inhale 2 puffs into the lungs every 4 hours as needed        chlorhexidine (HIBICLENS) 4 % liquid Shower with daily for one month to prevent MRSA skin infection or overgrowth.       clobetasol (TEMOVATE) 0.05 % cream        clotrimazole (LOTRIMIN) 1 % cream Apply topically 2 times daily 40 g 1     docusate sodium (COLACE) 100 MG capsule Take 100 mg by mouth 2 times daily as needed        FLUoxetine (PROZAC) 40 MG capsule Take 40 mg by mouth daily       furosemide (LASIX) 40 MG tablet Take 40 mg by mouth 2 times daily        hydrOXYzine (ATARAX) 50 MG tablet        loperamide (ANTI-DIARRHEAL) 2 MG capsule Take 2 mg by mouth as needed for diarrhea       Melatonin 5 MG TBDP Take 2.5-5 mg by mouth nightly as needed        metFORMIN (GLUCOPHAGE-XR) 500 MG 24 hr tablet Take 500 mg by mouth daily           Allergies:        Allergies   Allergen Reactions     Penicillin G Other (See Comments)     Azithromycin Rash     Benzonatate Unknown     Cephalexin Unknown     Sulfamethoxazole W/Trimethoprim Rash     Bupropion Other (See Comments)     No rash     Clindamycin Other (See Comments)     Aspirin Other (See Comments)     Pt refuses to take ASA        Social History:     Social History     Tobacco Use     Smoking status: Never Smoker     Smokeless tobacco: Never Used   Substance Use Topics     Alcohol use: No       History   Drug Use No         Family History:       Family History   Problem Relation Age of Onset     Skin Cancer Father      Kidney Disease Father         Required hemodialysis     Heart Disease Maternal Grandfather      Mental Illness Mother         Catatonia after loss of mother     Breast Cancer Paternal Grandmother       Cerebrovascular Disease Maternal Grandmother      Hypertension Maternal Grandmother      Diabetes Maternal Grandmother      Diabetes Paternal Grandfather          Physical Exam:     /84   Pulse 81   Temp 97.8  F (36.6  C) (Oral)   Wt 122.6 kg (270 lb 3.2 oz)   SpO2 94%   BMI 46.38 kg/m    Body mass index is 46.38 kg/m .    General Appearance:  healthy and alert, no distress     Musculoskeletal:         extremities non tender and without edema     Skin:    no lesions or rashes or lesions back side of right arm     Neurological:   normal gait, no gross defects                Psychiatric:      appropriate mood and affect                         ABDOMEN:  Obese.  No organomegaly.     PELVIC: Deferred per patient request.                      Assessment:     Mary Anglin is a 47 year old woman with a diagnosis of recurrent endometrial cancer.      A total of 25 minutes was spent with the patient, 20 minutes of which were spent in counseling the patient and/or treatment planning.     1.  Recurrent endometrial cancer, Patient has had definitive radiation treatment for recurrent endometrial cancer.   2.  No evidence of disease.   3. Stable 4 mm lung nodules  4.  Class 3 obesity.      We will check a CBC today to evaluate her dizziness.  Discussed with the patient we will see her back in 3-4 months and does not need any further scan. Also discussed to keep using vaginal dilator, as she has started having some vaginal atrophy, shortening and early stenosis.  The patient agrees with the plan, is very appreciative of her care.  All questions were answered.                    Abiodun Gore MD, MS    Department of Obstetrics and Gynecology   Division of Gynecologic Oncology   [facility]  Phone: [phone]      CC  Patient Care Team:  Maryse Marti MD as PCP - General (Family Practice)  Chris Roberson as Referring Physician (OB/Gyn)  MARYSE MARTI

## 2020-03-11 NOTE — NURSING NOTE
"Oncology Rooming Note    October 23, 2017 10:28 AM   Mary Anglin is a 47 year old female who presents for:    Chief Complaint   Patient presents with     Oncology Clinic Visit     return patient visit for follow up related to endometrial cancer     Initial Vitals: /79  Pulse 68  Temp 98.5  F (36.9  C) (Oral)  Resp 17  Ht 1.626 m (5' 4\")  Wt 122.9 kg (271 lb)  SpO2 97%  BMI 46.52 kg/m2 Estimated body mass index is 46.52 kg/(m^2) as calculated from the following:    Height as of this encounter: 1.626 m (5' 4\").    Weight as of this encounter: 122.9 kg (271 lb). Body surface area is 2.36 meters squared.  No Pain (0) Comment: Data Unavailable   No LMP recorded. Patient has had a hysterectomy.  Allergies reviewed: Yes  Medications reviewed: Yes    Medications: Medication refills not needed today.  Pharmacy name entered into [employer]:    [id number] - [place], [place] - [address] [facility] - [place], [place] - [address] [facility] [id number] [place] [address] [facility]    Clinical concerns: burning when urinating  dr. reeder was notified.    6 minutes for nursing intake (face to face time)     Aiden Marmolejo CMA              " Patient Specific Counseling (Will Not Stick From Patient To Patient): Patient declines tx Detail Level: Simple Detail Level: Generalized Detail Level: Zone

## 2020-07-10 ENCOUNTER — PATIENT OUTREACH (OUTPATIENT)
Dept: ONCOLOGY | Facility: CLINIC | Age: 50
End: 2020-07-10

## 2020-07-10 NOTE — PROGRESS NOTES
Patient called and requested to cancel upcoming appointment with Dr Gore. Patient being followed by local OBGYN.    Isabel Knutson RN

## (undated) DEVICE — JELLY LUBRICATING SURGILUBE 2OZ TUBE

## (undated) DEVICE — PAD CHUX UNDERPAD 23X24" 7136

## (undated) DEVICE — SPONGE RAY-TEC 4X8" 7318

## (undated) DEVICE — SOL NACL 0.9% IRRIG 1000ML BOTTLE 2F7124

## (undated) DEVICE — LINEN TOWEL PACK X5 5464

## (undated) DEVICE — DRAPE GYN/UROLOGY FLUID POUCH TUR 29455

## (undated) DEVICE — PITCHER STERILE 1000ML  SSK9004A

## (undated) DEVICE — PREP SCRUB CARE CHLOROXYLENOL (PCMX) 4OZ 29902-004

## (undated) DEVICE — LIGHT HANDLE X1 31140133

## (undated) DEVICE — PACK SET-UP STD 9102

## (undated) DEVICE — DRAPE LEGGINGS CLEAR 8430

## (undated) DEVICE — PREP SKIN SCRUB TRAY 4461A

## (undated) RX ORDER — FENTANYL CITRATE 50 UG/ML
INJECTION, SOLUTION INTRAMUSCULAR; INTRAVENOUS
Status: DISPENSED
Start: 2017-07-21

## (undated) RX ORDER — PROPOFOL 10 MG/ML
INJECTION, EMULSION INTRAVENOUS
Status: DISPENSED
Start: 2017-07-21

## (undated) RX ORDER — LIDOCAINE 50 MG/G
OINTMENT TOPICAL
Status: DISPENSED
Start: 2017-09-25